# Patient Record
Sex: MALE | Race: WHITE | Employment: OTHER | URBAN - METROPOLITAN AREA
[De-identification: names, ages, dates, MRNs, and addresses within clinical notes are randomized per-mention and may not be internally consistent; named-entity substitution may affect disease eponyms.]

---

## 2017-02-15 ENCOUNTER — HOSPITAL ENCOUNTER (OUTPATIENT)
Dept: CARDIOLOGY | Age: 72
Discharge: HOME | End: 2017-02-15
Payer: MEDICARE

## 2017-02-15 PROCEDURE — 93280 PM DEVICE PROGR EVAL DUAL: CPT

## 2017-08-16 ENCOUNTER — HOSPITAL ENCOUNTER (OUTPATIENT)
Dept: CARDIOLOGY | Age: 72
Discharge: HOME | End: 2017-08-16
Payer: MEDICARE

## 2017-08-16 PROCEDURE — 93280 PM DEVICE PROGR EVAL DUAL: CPT

## 2018-01-24 ENCOUNTER — OFFICE VISIT (OUTPATIENT)
Dept: PULMONOLOGY | Age: 73
End: 2018-01-24
Payer: MEDICARE

## 2018-01-24 VITALS
OXYGEN SATURATION: 94 % | WEIGHT: 219 LBS | HEART RATE: 79 BPM | TEMPERATURE: 98.4 F | BODY MASS INDEX: 31.35 KG/M2 | DIASTOLIC BLOOD PRESSURE: 72 MMHG | SYSTOLIC BLOOD PRESSURE: 132 MMHG | HEIGHT: 70 IN

## 2018-01-24 DIAGNOSIS — R06.02 SOB (SHORTNESS OF BREATH): ICD-10-CM

## 2018-01-24 DIAGNOSIS — R60.9 EDEMA, UNSPECIFIED TYPE: ICD-10-CM

## 2018-01-24 DIAGNOSIS — E66.09 OBESITY DUE TO EXCESS CALORIES, UNSPECIFIED CLASSIFICATION, UNSPECIFIED WHETHER SERIOUS COMORBIDITY PRESENT: ICD-10-CM

## 2018-01-24 DIAGNOSIS — G47.34 NOCTURNAL HYPOXIA: Primary | ICD-10-CM

## 2018-01-24 PROBLEM — C84.90 MATURE NK/T-CELL LYMPHOMA (HCC): Status: ACTIVE | Noted: 2018-01-24

## 2018-01-24 PROBLEM — Z95.0 PACEMAKER: Status: ACTIVE | Noted: 2018-01-24

## 2018-01-24 PROBLEM — Z95.2 S/P MVR (MITRAL VALVE REPLACEMENT): Status: ACTIVE | Noted: 2018-01-24

## 2018-01-24 PROCEDURE — G8417 CALC BMI ABV UP PARAM F/U: HCPCS | Performed by: INTERNAL MEDICINE

## 2018-01-24 PROCEDURE — 3017F COLORECTAL CA SCREEN DOC REV: CPT | Performed by: INTERNAL MEDICINE

## 2018-01-24 PROCEDURE — G8427 DOCREV CUR MEDS BY ELIG CLIN: HCPCS | Performed by: INTERNAL MEDICINE

## 2018-01-24 PROCEDURE — G8484 FLU IMMUNIZE NO ADMIN: HCPCS | Performed by: INTERNAL MEDICINE

## 2018-01-24 PROCEDURE — 1123F ACP DISCUSS/DSCN MKR DOCD: CPT | Performed by: INTERNAL MEDICINE

## 2018-01-24 PROCEDURE — 1036F TOBACCO NON-USER: CPT | Performed by: INTERNAL MEDICINE

## 2018-01-24 PROCEDURE — 99203 OFFICE O/P NEW LOW 30 MIN: CPT | Performed by: INTERNAL MEDICINE

## 2018-01-24 PROCEDURE — 4040F PNEUMOC VAC/ADMIN/RCVD: CPT | Performed by: INTERNAL MEDICINE

## 2018-01-24 RX ORDER — WARFARIN SODIUM 5 MG/1
5 TABLET ORAL
COMMUNITY

## 2018-01-24 RX ORDER — AMLODIPINE, VALSARTAN AND HYDROCHLOROTHIAZIDE 10; 320; 25 MG/1; MG/1; MG/1
TABLET ORAL
Status: ON HOLD | COMMUNITY
End: 2022-01-13

## 2018-01-24 RX ORDER — SULFAMETHOXAZOLE AND TRIMETHOPRIM 80; 16 MG/ML; MG/ML
INJECTION INTRAVENOUS
Status: ON HOLD | COMMUNITY
End: 2018-02-27

## 2018-01-24 RX ORDER — TAMSULOSIN HYDROCHLORIDE 0.4 MG/1
0.4 CAPSULE ORAL DAILY
COMMUNITY

## 2018-01-24 RX ORDER — LANOLIN ALCOHOL/MO/W.PET/CERES
5 CREAM (GRAM) TOPICAL NIGHTLY PRN
COMMUNITY

## 2018-01-24 RX ORDER — CARVEDILOL 25 MG/1
25 TABLET ORAL 2 TIMES DAILY WITH MEALS
COMMUNITY

## 2018-01-24 RX ORDER — FUROSEMIDE 20 MG/1
20 TABLET ORAL DAILY
COMMUNITY
End: 2019-10-10

## 2018-01-24 RX ORDER — ROSUVASTATIN CALCIUM 20 MG/1
20 TABLET, COATED ORAL DAILY
COMMUNITY
End: 2019-10-10

## 2018-01-24 ASSESSMENT — SLEEP AND FATIGUE QUESTIONNAIRES
HOW LIKELY ARE YOU TO NOD OFF OR FALL ASLEEP WHILE SITTING AND TALKING TO SOMEONE: 0
HOW LIKELY ARE YOU TO NOD OFF OR FALL ASLEEP WHILE WATCHING TV: 2
HOW LIKELY ARE YOU TO NOD OFF OR FALL ASLEEP WHEN YOU ARE A PASSENGER IN A CAR FOR AN HOUR WITHOUT A BREAK: 3
HOW LIKELY ARE YOU TO NOD OFF OR FALL ASLEEP WHILE SITTING QUIETLY AFTER LUNCH WITHOUT ALCOHOL: 1
HOW LIKELY ARE YOU TO NOD OFF OR FALL ASLEEP IN A CAR, WHILE STOPPED FOR A FEW MINUTES IN TRAFFIC: 0
ESS TOTAL SCORE: 10
HOW LIKELY ARE YOU TO NOD OFF OR FALL ASLEEP WHILE SITTING INACTIVE IN A PUBLIC PLACE: 0
HOW LIKELY ARE YOU TO NOD OFF OR FALL ASLEEP WHILE LYING DOWN TO REST IN THE AFTERNOON WHEN CIRCUMSTANCES PERMIT: 3
HOW LIKELY ARE YOU TO NOD OFF OR FALL ASLEEP WHILE SITTING AND READING: 1

## 2018-01-24 NOTE — PROGRESS NOTES
facility-administered medications for this visit. Allergies:  Review of patient's allergies indicates no known allergies. No past medical history on file. No past surgical history on file. History reviewed. No pertinent family history. Social History     Social History    Marital status:      Spouse name: N/A    Number of children: N/A    Years of education: N/A     Occupational History    Not on file. Social History Main Topics    Smoking status: Never Smoker    Smokeless tobacco: Never Used    Alcohol use No    Drug use: No    Sexual activity: Not on file     Other Topics Concern    Not on file     Social History Narrative    No narrative on file     FH   Emphysema   No LLOYD       Review of Systems   Psychiatric/Behavioral: Positive for sleep disturbance. ROS: 10 organs review of system is done including general, psychological, ENT, hematological, endocrine, respiratory, cardiovascular, gastrointestinal, musculoskeletal, neurological,  allergy and Immunology is done and is otherwise negative. Objective:     Vitals:    01/24/18 0856   BP: 132/72   Site: Right Arm   Position: Sitting   Cuff Size: Large Adult   Pulse: 79   Temp: 98.4 °F (36.9 °C)   TempSrc: Tympanic   SpO2: 94%   Weight: 219 lb (99.3 kg)   Height: 5' 10\" (1.778 m)         Physical Exam   Constitutional: He is oriented to person, place, and time. He appears well-developed and well-nourished. HENT:   Head: Normocephalic and atraumatic. Eyes: Conjunctivae are normal. Pupils are equal, round, and reactive to light. Left eye exhibits no discharge. Neck: Normal range of motion. Neck supple. No JVD present. Cardiovascular: Normal rate, regular rhythm and normal heart sounds. Exam reveals no gallop and no friction rub. No murmur heard. metallic click    Pulmonary/Chest: Effort normal and breath sounds normal. No respiratory distress. He has no wheezes. He has no rales. He exhibits no tenderness.

## 2018-01-25 ENCOUNTER — HOSPITAL ENCOUNTER (OUTPATIENT)
Dept: SLEEP CENTER | Age: 73
Discharge: HOME | End: 2018-01-25
Payer: MEDICARE

## 2018-01-25 PROCEDURE — 95810 POLYSOM 6/> YRS 4/> PARAM: CPT

## 2018-01-29 ENCOUNTER — HOSPITAL ENCOUNTER (OUTPATIENT)
Dept: SLEEP CENTER | Age: 73
Discharge: HOME | End: 2018-01-29
Payer: MEDICARE

## 2018-01-29 PROCEDURE — 95811 POLYSOM 6/>YRS CPAP 4/> PARM: CPT

## 2018-01-30 ENCOUNTER — TELEPHONE (OUTPATIENT)
Dept: PULMONOLOGY | Age: 73
End: 2018-01-30

## 2018-02-14 ENCOUNTER — HOSPITAL ENCOUNTER (OUTPATIENT)
Dept: CARDIOLOGY | Age: 73
Discharge: HOME | End: 2018-02-14
Payer: MEDICARE

## 2018-02-14 PROCEDURE — 93280 PM DEVICE PROGR EVAL DUAL: CPT

## 2018-02-21 ENCOUNTER — OFFICE VISIT (OUTPATIENT)
Dept: PULMONOLOGY | Age: 73
End: 2018-02-21
Payer: MEDICARE

## 2018-02-21 VITALS
WEIGHT: 222 LBS | TEMPERATURE: 97 F | HEIGHT: 70 IN | BODY MASS INDEX: 31.78 KG/M2 | OXYGEN SATURATION: 96 % | RESPIRATION RATE: 22 BRPM | HEART RATE: 87 BPM | DIASTOLIC BLOOD PRESSURE: 84 MMHG | SYSTOLIC BLOOD PRESSURE: 138 MMHG

## 2018-02-21 DIAGNOSIS — E66.09 OBESITY DUE TO EXCESS CALORIES, UNSPECIFIED CLASSIFICATION, UNSPECIFIED WHETHER SERIOUS COMORBIDITY PRESENT: ICD-10-CM

## 2018-02-21 DIAGNOSIS — G47.33 OSA ON CPAP: Primary | ICD-10-CM

## 2018-02-21 DIAGNOSIS — R06.02 SOB (SHORTNESS OF BREATH): ICD-10-CM

## 2018-02-21 DIAGNOSIS — Z99.89 OSA ON CPAP: Primary | ICD-10-CM

## 2018-02-21 DIAGNOSIS — R60.9 EDEMA, UNSPECIFIED TYPE: ICD-10-CM

## 2018-02-21 DIAGNOSIS — R09.81 NASAL CONGESTION: ICD-10-CM

## 2018-02-21 PROCEDURE — 4040F PNEUMOC VAC/ADMIN/RCVD: CPT | Performed by: INTERNAL MEDICINE

## 2018-02-21 PROCEDURE — 1036F TOBACCO NON-USER: CPT | Performed by: INTERNAL MEDICINE

## 2018-02-21 PROCEDURE — 3017F COLORECTAL CA SCREEN DOC REV: CPT | Performed by: INTERNAL MEDICINE

## 2018-02-21 PROCEDURE — G8427 DOCREV CUR MEDS BY ELIG CLIN: HCPCS | Performed by: INTERNAL MEDICINE

## 2018-02-21 PROCEDURE — 99213 OFFICE O/P EST LOW 20 MIN: CPT | Performed by: INTERNAL MEDICINE

## 2018-02-21 PROCEDURE — G8484 FLU IMMUNIZE NO ADMIN: HCPCS | Performed by: INTERNAL MEDICINE

## 2018-02-21 PROCEDURE — 1123F ACP DISCUSS/DSCN MKR DOCD: CPT | Performed by: INTERNAL MEDICINE

## 2018-02-21 PROCEDURE — G8417 CALC BMI ABV UP PARAM F/U: HCPCS | Performed by: INTERNAL MEDICINE

## 2018-02-21 RX ORDER — FLUTICASONE PROPIONATE 50 MCG
1 SPRAY, SUSPENSION (ML) NASAL DAILY PRN
Qty: 1 BOTTLE | Refills: 1 | Status: SHIPPED | OUTPATIENT
Start: 2018-02-21 | End: 2019-10-10

## 2018-02-21 ASSESSMENT — ENCOUNTER SYMPTOMS
NAUSEA: 0
VOMITING: 0
ABDOMINAL PAIN: 0
SORE THROAT: 0
COUGH: 0
RHINORRHEA: 0
CHEST TIGHTNESS: 0
SHORTNESS OF BREATH: 0
DIARRHEA: 0
SINUS PRESSURE: 0
WHEEZING: 0

## 2018-02-21 NOTE — PROGRESS NOTES
Subjective:     Davon Maharaj is a 67 y.o. male who complains today of:     Chief Complaint   Patient presents with    Shortness of Breath     3 week fu, just been on CPAP 5 days can tell a difference       HPI  Patient presents for SOB/ LLOYD follow up , he started CPAP on Friday , much improved since then sleeping well, mild SOB, energy significantly better , no fever and chills and no cough   Nose is congested , no symptoms of RLS     Allergies:  Review of patient's allergies indicates no known allergies. No past medical history on file. No past surgical history on file. No family history on file. Social History     Social History    Marital status:      Spouse name: N/A    Number of children: N/A    Years of education: N/A     Occupational History    Not on file. Social History Main Topics    Smoking status: Never Smoker    Smokeless tobacco: Never Used    Alcohol use No    Drug use: No    Sexual activity: Not on file     Other Topics Concern    Not on file     Social History Narrative    No narrative on file         Review of Systems   Constitutional: Negative for chills, diaphoresis, fatigue and fever. HENT: Negative for congestion, postnasal drip, rhinorrhea, sinus pressure, sneezing and sore throat. Eyes: Negative for visual disturbance. Respiratory: Negative for cough, chest tightness, shortness of breath and wheezing. Cardiovascular: Positive for palpitations. Negative for chest pain and leg swelling. Gastrointestinal: Negative for abdominal pain, diarrhea, nausea and vomiting. Genitourinary: Negative for difficulty urinating and hematuria. Musculoskeletal: Negative for arthralgias, joint swelling and myalgias. Skin: Negative for rash. Allergic/Immunologic: Negative for environmental allergies. Neurological: Negative for dizziness, tremors, weakness and headaches. Psychiatric/Behavioral: Negative for behavioral problems and sleep disturbance.

## 2018-02-27 ENCOUNTER — HOSPITAL ENCOUNTER (OUTPATIENT)
Dept: CARDIAC CATH/INVASIVE PROCEDURES | Age: 73
Setting detail: OUTPATIENT SURGERY
Discharge: HOME | End: 2018-02-27
Attending: INTERNAL MEDICINE | Admitting: INTERNAL MEDICINE
Payer: MEDICARE

## 2018-02-27 VITALS
OXYGEN SATURATION: 94 % | WEIGHT: 220 LBS | RESPIRATION RATE: 20 BRPM | HEIGHT: 70 IN | SYSTOLIC BLOOD PRESSURE: 136 MMHG | DIASTOLIC BLOOD PRESSURE: 70 MMHG | HEART RATE: 61 BPM | TEMPERATURE: 97.8 F | BODY MASS INDEX: 31.5 KG/M2

## 2018-02-27 PROBLEM — I05.9 MITRAL VALVE DISEASE: Status: ACTIVE | Noted: 2018-02-27

## 2018-02-27 PROBLEM — G47.33 OSA (OBSTRUCTIVE SLEEP APNEA): Status: ACTIVE | Noted: 2018-02-27

## 2018-02-27 PROBLEM — I10 ESSENTIAL HYPERTENSION: Status: ACTIVE | Noted: 2018-02-27

## 2018-02-27 PROBLEM — I48.19 PERSISTENT ATRIAL FIBRILLATION (HCC): Status: ACTIVE | Noted: 2018-02-27

## 2018-02-27 PROBLEM — Z95.2 MITRAL VALVE REPLACED: Status: ACTIVE | Noted: 2018-02-27

## 2018-02-27 LAB
EKG ATRIAL RATE: 375 BPM
EKG ATRIAL RATE: 63 BPM
EKG P AXIS: 18 DEGREES
EKG P-R INTERVAL: 256 MS
EKG Q-T INTERVAL: 494 MS
EKG Q-T INTERVAL: 512 MS
EKG QRS DURATION: 160 MS
EKG QRS DURATION: 170 MS
EKG QTC CALCULATION (BAZETT): 505 MS
EKG QTC CALCULATION (BAZETT): 523 MS
EKG R AXIS: -2 DEGREES
EKG R AXIS: -23 DEGREES
EKG T AXIS: 45 DEGREES
EKG T AXIS: 56 DEGREES
EKG VENTRICULAR RATE: 63 BPM
EKG VENTRICULAR RATE: 63 BPM
INR BLD: 3.6
PROTHROMBIN TIME: 38.1 SEC (ref 8.1–13.7)

## 2018-02-27 PROCEDURE — 2580000003 HC RX 258: Performed by: INTERNAL MEDICINE

## 2018-02-27 PROCEDURE — 93010 ELECTROCARDIOGRAM REPORT: CPT | Performed by: INTERNAL MEDICINE

## 2018-02-27 PROCEDURE — 93280 PM DEVICE PROGR EVAL DUAL: CPT

## 2018-02-27 PROCEDURE — 6360000002 HC RX W HCPCS: Performed by: INTERNAL MEDICINE

## 2018-02-27 PROCEDURE — 92960 CARDIOVERSION ELECTRIC EXT: CPT | Performed by: INTERNAL MEDICINE

## 2018-02-27 PROCEDURE — 85610 PROTHROMBIN TIME: CPT

## 2018-02-27 PROCEDURE — 93005 ELECTROCARDIOGRAM TRACING: CPT

## 2018-02-27 RX ORDER — PROPOFOL 10 MG/ML
INJECTION, EMULSION INTRAVENOUS CONTINUOUS PRN
Status: COMPLETED | OUTPATIENT
Start: 2018-02-27 | End: 2018-02-27

## 2018-02-27 RX ORDER — VALSARTAN 320 MG/1
320 TABLET ORAL DAILY
COMMUNITY
End: 2019-10-10

## 2018-02-27 RX ORDER — PROPOFOL 10 MG/ML
10 INJECTION, EMULSION INTRAVENOUS ONCE
Status: DISCONTINUED | OUTPATIENT
Start: 2018-02-27 | End: 2018-02-27 | Stop reason: HOSPADM

## 2018-02-27 RX ORDER — DEXTROSE AND SODIUM CHLORIDE 5; .45 G/100ML; G/100ML
INJECTION, SOLUTION INTRAVENOUS ONCE
Status: COMPLETED | OUTPATIENT
Start: 2018-02-27 | End: 2018-02-27

## 2018-02-27 RX ADMIN — PROPOFOL 70 MG: 10 INJECTION, EMULSION INTRAVENOUS at 10:00

## 2018-02-27 RX ADMIN — DEXTROSE AND SODIUM CHLORIDE: 5; 450 INJECTION, SOLUTION INTRAVENOUS at 09:12

## 2018-02-27 NOTE — PROGRESS NOTES
Patient is awake and talking. Vitals are stable. Wife and Dr. Chloe Gamble at bedside. Will continue to monitor patient.

## 2018-02-27 NOTE — H&P
Vitals  Vital Signs   Recorded: [id number] 09:43AM   Height: 5 ft 10 in  Weight: 222 lb   BMI Calculated: 31.85  BSA Calculated: 2.18  Blood Pressure: 130 / 80, RUE, Sitting  Heart Rate: 65    PT.'s weight taken without shoes     Results/Data  Prothrombin Time [id number] 02:07PM Harley Simon     Test Name Result Flag Reference   PT 43.5 sec H 9.8-12.7   PLEASE NOTE NEW REFERENCE RANGE EFFECTIVE 02/01/2018   INR 3.80 H 0.90-1.10   PLEASE NOTE NEW REFERENCE RANGE EFFECTIVE 02/01/2018       Surgical History  PT. denies colonoscopy procedure     Procedure    EKG done in office today; :   .     Assessment   1. Never a smoker   2. Sick sinus syndrome (427.81) (I49.5)   3. Hypertension (401.9) (I10)   4. High risk medication use (V58.69) (Z79.899)   5. Anticoagulant long-term use (V58.61) (Z79.01)   6. Hyperlipidemia (272.4) (E78.5)   7. Mitral regurgitation (424.0) (I34.0)   8. Obesity (278.00) (E66.9)   9. Sleep apnea (780.57) (G47.30)   10. Fatigue (780.79) (R53.83)   11. Atrial fibrillation (427.31) (I48.91)   12. Atrial flutter (427.32) (I48.92)    Plan   1. Cardioversion.; Status:Active; Requested for:[id number];    2. Encouraged regular exercise,improved dietary habits.; Status:Complete;   Done:   [id number]   3. Lifestyle education regarding diet; Status:Complete;   Done: 94GBV2396   4. TSH; Status:Active; Requested for:37Zvq0204;    5. Access your medical record, request prescriptions, view laboratory and testing done in   our office, request appointments, view immunization records and message your provider   through our safe and secure patient portal. Ask a staff member how   to register or visit us at [email or web address] to get started today.;   Status:Active; Requested for:19Rzp7152;    6. Avoid foods and beverages that contain caffeine.; Status:Active; Requested   for:57Edn0034;    7. Drink plenty of fluids.; Status:Active; Requested for:18Fez4586;    8.  Please bring all medicines, vitamins, and

## 2018-02-27 NOTE — DISCHARGE SUMMARY
Cardiology Outpatient Discharge Note      Patient:  Simone Alvarado  YOB: 1945  MRN: [id number]   Acct: [de-identified]  Admit Date:  2/27/2018   Discharge Date: 2/27/2018  Primary Cardiologist:Dr. Emerald Dela Cruz      Procedures: [facility] with 200 Joules. Sinus to A/V pacing  Complications: none  Post DCC ECG:  AV pacing    Principal Problem:    Persistent atrial fibrillation (HCC)  Active Problems:    Essential hypertension    LLOYD (obstructive sleep apnea)    Mitral valve disease    Mitral valve replaced      Impression/Plan:  1. Successful conversion of atrial fibrillation to normal sinus rhythm/AV pacing. He had no complications of anesthesia. 2. To be discharged home today with follow-up in 3 weeks. No change of medicines as necessary. He is therapeutically anticoagulated for both atrial fibrillation and his mechanical valve    Chief Complaint/Active Symptoms: No angina/dyspnea/palpitations. Awake and alert after [facility]    Objective:   /82   Pulse 62   Temp 97.8 °F (36.6 °C) (Temporal)   Resp 16   Ht 5' 10\" (1.778 m)   Wt 220 lb (99.8 kg)   SpO2 96%   BMI 31.57 kg/m²    Wt Readings from Last 3 Encounters:   02/27/18 220 lb (99.8 kg)   02/21/18 222 lb (100.7 kg)   01/24/18 219 lb (99.3 kg)       TELEMETRY: paced                            Rhythm Strip: paced a/v  NYHA Class: I    Physical Exam:  General Appearance: alert and oriented to person, place and time, in no acute distress  Cardiovascular: normal rate, regular rhythm, normal S1 and S2, no murmurs, rubs, clicks, or gallops,  no JVD  Pulmonary/Chest: clear to auscultation bilaterally- no wheezes, rales or rhonchi, normal air movement, no respiratory distress  Abdomen: soft, non-tender, non-distended, normal bowel sounds, no masses   Extremities: no cyanosis, clubbing.  Trace edema  Skin: warm and dry, no rash or erythema  Neck: supple and non-tender without mass, no thyromegaly   Neurological: alert, oriented, normal speech, no focal findings or movement disorder noted    Medications:    propofol  10 mg Intravenous Once      propofol         propofol  Continuous PRN       Lab Data:    Cardiac Enzymes:  No results for input(s): CKTOTAL, CKMB, CKMBINDEX, TROPONINI in the last 72 hours.   ProBNP: No results found for: PROBNP    CBC:   Lab Results   Component Value Date    WBC 5.3 02/16/2015    RBC 4.58 02/16/2015    HGB 14.5 02/16/2015    HCT 43.7 02/16/2015     02/16/2015       CMP:  Lab Results   Component Value Date     02/16/2015    K 3.7 02/16/2015     02/16/2015    CO2 28 02/16/2015    BUN 20 02/16/2015    CREATININE 0.93 02/16/2015    GFRAA >60.0 02/16/2015    LABGLOM >60.0 02/16/2015    GLUCOSE 111 02/16/2015    CALCIUM 9.1 02/16/2015       Hepatic Function Panel:  No results found for: ALKPHOS, ALT, AST, PROT, BILITOT, BILIDIR, IBILI, LABALBU    Magnesium:  No results found for: MG    PT/INR:    Lab Results   Component Value Date    PROTIME 38.1 02/27/2018    INR 3.6 02/27/2018                Electronically signed by Parker Villarreal MD on 2/27/2018 at 10:12 AM

## 2018-02-27 NOTE — PROGRESS NOTES
Patient resting in bed. Vitals are stable. Denies any pain or discomfort at this time. Wife at bedside. Will continue to monitor.

## 2018-04-19 ENCOUNTER — OFFICE VISIT (OUTPATIENT)
Dept: PULMONOLOGY | Age: 73
End: 2018-04-19
Payer: MEDICARE

## 2018-04-19 VITALS
HEIGHT: 70 IN | SYSTOLIC BLOOD PRESSURE: 130 MMHG | HEART RATE: 79 BPM | OXYGEN SATURATION: 93 % | DIASTOLIC BLOOD PRESSURE: 70 MMHG | WEIGHT: 224.2 LBS | TEMPERATURE: 96.9 F | BODY MASS INDEX: 32.1 KG/M2

## 2018-04-19 DIAGNOSIS — G47.33 OSA (OBSTRUCTIVE SLEEP APNEA): Primary | ICD-10-CM

## 2018-04-19 DIAGNOSIS — R60.0 LOWER EXTREMITY EDEMA: ICD-10-CM

## 2018-04-19 PROCEDURE — 3017F COLORECTAL CA SCREEN DOC REV: CPT | Performed by: INTERNAL MEDICINE

## 2018-04-19 PROCEDURE — G8417 CALC BMI ABV UP PARAM F/U: HCPCS | Performed by: INTERNAL MEDICINE

## 2018-04-19 PROCEDURE — 99213 OFFICE O/P EST LOW 20 MIN: CPT | Performed by: INTERNAL MEDICINE

## 2018-04-19 PROCEDURE — 1036F TOBACCO NON-USER: CPT | Performed by: INTERNAL MEDICINE

## 2018-04-19 PROCEDURE — G8427 DOCREV CUR MEDS BY ELIG CLIN: HCPCS | Performed by: INTERNAL MEDICINE

## 2018-04-19 PROCEDURE — 1123F ACP DISCUSS/DSCN MKR DOCD: CPT | Performed by: INTERNAL MEDICINE

## 2018-04-19 PROCEDURE — 4040F PNEUMOC VAC/ADMIN/RCVD: CPT | Performed by: INTERNAL MEDICINE

## 2018-05-01 ENCOUNTER — TELEPHONE (OUTPATIENT)
Dept: PULMONOLOGY | Age: 73
End: 2018-05-01

## 2018-06-05 ENCOUNTER — HOSPITAL ENCOUNTER (OUTPATIENT)
Dept: CARDIOLOGY | Age: 73
Discharge: HOME | End: 2018-06-05
Payer: MEDICARE

## 2018-06-05 PROCEDURE — 93280 PM DEVICE PROGR EVAL DUAL: CPT

## 2018-06-11 ENCOUNTER — TELEPHONE (OUTPATIENT)
Dept: PULMONOLOGY | Age: 73
End: 2018-06-11

## 2018-12-20 ENCOUNTER — HOSPITAL ENCOUNTER (OUTPATIENT)
Dept: CARDIOLOGY | Age: 73
Discharge: HOME | End: 2018-12-20
Payer: MEDICARE

## 2018-12-20 PROCEDURE — 93280 PM DEVICE PROGR EVAL DUAL: CPT

## 2019-02-28 NOTE — PROGRESS NOTES
PM analysis post CV  per order Dr. Cherelle Stone. Gd pacer capture, sense, and lead impedances. Normal AAIR-DDDR pacing. No changes made. Battery status OK. Pt is without complaints    labs are within range to proceed   pt has call bell within reach   Clinical trials Nurse Reena Gloria RN did stop and talk with pt chairside at infusion    Baron Gardner RN  Met with patient and discussed  tx

## 2019-04-11 LAB
INR BLD: 2.7 (ref 0.9–1.1)
PROTHROMBIN TIME: 30.7 SEC (ref 9.7–12.7)

## 2019-05-09 LAB
INR BLD: 2.4 (ref 0.9–1.1)
PROTHROMBIN TIME: 27.9 SEC (ref 9.7–12.7)

## 2019-06-06 LAB
CHOLESTEROL/HDL RATIO: 2.8
CHOLESTEROL: 143 MG/DL (ref 0–199)
HDLC SERPL-MCNC: 52 MG/DL
INR BLD: 3.4 (ref 0.9–1.1)
LDL CHOLESTEROL: 78 MG/DL (ref 0–99)
PROTHROMBIN TIME: 38.7 SEC (ref 9.7–12.7)
TRIGL SERPL-MCNC: 63 MG/DL (ref 0–149)
VLDLC SERPL CALC-MCNC: 13 MG/DL (ref 0–40)

## 2019-06-17 ENCOUNTER — HOSPITAL ENCOUNTER (OUTPATIENT)
Dept: PHYSICAL THERAPY | Age: 74
Setting detail: THERAPIES SERIES
Discharge: HOME | End: 2019-06-17
Payer: MEDICARE

## 2019-06-17 ENCOUNTER — HOSPITAL ENCOUNTER (OUTPATIENT)
Dept: ULTRASOUND IMAGING | Age: 74
Discharge: HOME | End: 2019-06-19
Payer: MEDICARE

## 2019-06-17 DIAGNOSIS — I65.23 BILATERAL CAROTID ARTERY STENOSIS: ICD-10-CM

## 2019-06-17 PROCEDURE — 93880 EXTRACRANIAL BILAT STUDY: CPT

## 2019-06-17 PROCEDURE — 97161 PT EVAL LOW COMPLEX 20 MIN: CPT

## 2019-06-17 NOTE — PROGRESS NOTES
[address]     Ph: [phone]  Fax: [phone]    [x] Certification  [] Recertification []  Plan of Care  [] Progress Note [] Discharge      To:  Dr. Riki Grady      From:  Irean Landau, PT  Patient: Nabil Champion     : 1945  Diagnosis: Vertigo     Date: 2019  Treatment Diagnosis: Vertigo    Plan of Care/Certification Expiration Date: 19  Progress Report Period from:  2019  to 2019    Total # of Visits to Date: 1   No Show: 0    Canceled Appointment: 0     OBJECTIVE:   Strength in natasha UEs and LEs WFL    Smooth pursuits, saccade and VOR WFL    Spine  Cervical: Flex 37deg, Ext 6deg, SB: Rt 11deg, Lt 8deg, rot: Rt 28deg, Lt 23deg    Natasha Hallpike (-) - limited by cervical extension     Body structures, Functions, Activity limitations: Vestibular Impairment  Assessment: Pt presents with occasional bouts of spinning dizziness for the last several months. Pt with no significant LOB reported and demonstrates strength WFL. Pt with significant limited cervical ROM possibly contributing to dizziness symptoms. Pt's smooth pursuits, saccades, and VOR WFL and demos (-) natasha Head Thrust test.  Attempted natasha Hallpike with no increase in symptoms or nystagmus noted but limited by cervical ROM. Cervical AROM and chin tucks given for pt to improve ROM while awaiting to see specialist.  Discussed pt to f/u in the next 30 days if dizziness reoccurs - if no f/u in that time will D/C chart. Prognosis: Good    PLAN: [] Evaluate and Treat  Frequency/Duration:  Plan  Plan Comment: F/u in the next 30 days if needed     Precautions:                  Patient Status:[x] Hold x30 days    [] Discharge PT. Recommend pt continue with HEP.      [] Additional visits requested, Please re-certify for additional visits:          Signature: Electronically signed by Irean Landau, PT on 19 at 1:23 PM      If you have any questions or concerns, please don't hesitate to call. Thank you for your referral.    I have reviewed this plan of care and certify a need for medically necessary rehabilitation services.     Physician Signature:__________________________________________________________  Date:  Please sign and return

## 2019-06-17 NOTE — PROGRESS NOTES
28deg, Lt 23deg  Strength RLE  Strength RLE: WFL  Strength LLE  Strength LLE: WFL  Strength RUE  Strength RUE: WFL  Strength LUE  Strength LUE: WFL    Vestibular Assessment:  Patient experiences spells of vertigo?: Yes  Describe Vertigo: Room Spinning  How long do these spells last?: Minutes  Vertigo Is: Spontaneous     Patient experiences disequilibrium?: No     Associated hearing/ear symptoms: No     Any recent Illness or Infections?: No     Any recent accidents or head trauma?: No     Any history of migraines or headaches?: No     Oculomotor Examination  Oculomotor Examination: Yes  Spontaneous Nystagmus: No  Gaze Holding Nystagmus: No  Eye Movement Range: Conjugate  Smooth Pursuits: WNL  Saccades: WNL (2 or less)  VOR (slow): WNL  Head Impulse Test/ Head Thrust Test: Negative       Positional Vertigo:   Intensity (0-5) Nystagmus Duration   Sit -> Rt Sidelying 0     Rt Sidelying -> Sit 0     Sit -> Lt Sidelying 0     Lt Sidelying -> Sit 0       Positional Testing  Right [place] Hallpike: Negative(Limited cervical ext ROM)  Left [place] Hallpike: Negative(Limited cervical ext ROM)    Exercises:   Exercises  Exercise 20: HEP: cervical AROM and chin tucks      POST-PAIN    Pain Rating (0-10 pain scale): 0/10  Location and Pain Description same as pre-pain unless otherwise indicated. Action: [x] NA  [] Call Physician  [] Perform HEP  [] Meds as prescribed     Assessment  Conditions Requiring Skilled Therapeutic Intervention  Body structures, Functions, Activity limitations: Vestibular Impairment  Assessment: Pt presents with occasional bouts of spinning dizziness for the last several months. Pt with no significant LOB reported and demonstrates strength WFL. Pt with significant limited cervical ROM possibly contributing to dizziness symptoms. Pt's smooth pursuits, saccades, and VOR WFL and demos (-) natasha Head Thrust test.  Attempted natasha Hallpike with no increase in symptoms or nystagmus noted but limited by cervical ROM.

## 2019-06-20 ENCOUNTER — HOSPITAL ENCOUNTER (OUTPATIENT)
Dept: CARDIOLOGY | Age: 74
Discharge: HOME | End: 2019-06-20
Payer: MEDICARE

## 2019-06-20 PROCEDURE — 93280 PM DEVICE PROGR EVAL DUAL: CPT

## 2019-07-02 LAB
ALBUMIN: 4.2 G/DL (ref 3.4–5)
ALP BLD-CCNC: 57 U/L (ref 33–136)
ALT SERPL-CCNC: 23 U/L (ref 10–52)
ANION GAP SERPL CALCULATED.3IONS-SCNC: 11 MMOL/L (ref 10–20)
AST SERPL-CCNC: 21 U/L (ref 9–39)
BICARBONATE: 31 MMOL/L (ref 21–32)
BILIRUB SERPL-MCNC: 0.9 MG/DL (ref 0–1.2)
CALCIUM SERPL-MCNC: 9.3 MG/DL (ref 8.6–10.3)
CHLORIDE BLD-SCNC: 100 MMOL/L (ref 98–107)
CREAT SERPL-MCNC: 1 MG/DL (ref 0.5–1.3)
ESTIMATED AVERAGE GLUCOSE: 140 MG/DL
GFR AFRICAN AMERICAN: >60 ML/MIN/1.73M2
GFR NON-AFRICAN AMERICAN: >60 ML/MIN/1.73M2
GLUCOSE: 137 MG/DL (ref 74–99)
HBA1C MFR BLD: 6.5 %
INR BLD: 3 (ref 0.9–1.1)
POTASSIUM SERPL-SCNC: 3.6 MMOL/L (ref 3.5–5.3)
PROTHROMBIN TIME: 34.1 SEC (ref 9.7–12.7)
SODIUM BLD-SCNC: 138 MMOL/L (ref 136–145)
TOTAL PROTEIN: 7.2 G/DL (ref 6.4–8.2)
UREA NITROGEN: 16 MG/DL (ref 6–23)

## 2019-07-15 NOTE — PROGRESS NOTES
[address] [facility] [address]      [address]     [address] [facility], [facility] [address]  Ph: [phone]     Ph: [phone]  Fax: [phone]     Fax: [phone]      Date: 7/15/2019  Patient Name: Angie Jung  : 1945  MRN: [id number]    To:  Dr. Terrell Palmer  From: Cl Barth, DANA       [x]   Patient was previously on hold since 19 and is now appropriate for discharge. Please see last POC/ Progress Report for last measured functional status. Thank you for your referral and the opportunity to treat this patient. Please contact us with any questions or concerns.         Electronically signed by Yanely De La Fuente PTA on 7/15/2019 at 12:08 PM  Electronically signed by Cl Barth PT on 7/15/2019 at 1:36 PM

## 2019-07-15 NOTE — PROGRESS NOTES
[facility]    [x]  [address]  []  [address]      [address]     [address]  Ph: [phone]     Ph: [phone]  Fax: [phone]     Fax: [phone]      Date: 7/15/2019  Patient Name: Mehrdad France  : 1945  MRN: [id number]    Pt called back to state he is doing well, requests to be discharged at this time.      Electronically signed by Carlos Manuel Cortés PTA on 7/15/2019 at 12:07 PM

## 2019-09-23 ENCOUNTER — TELEPHONE (OUTPATIENT)
Dept: PULMONOLOGY | Age: 74
End: 2019-09-23

## 2019-09-24 LAB
INR BLD: 4.8 (ref 0.9–1.1)
PROTHROMBIN TIME: 54.7 SEC (ref 9.7–12.7)

## 2019-10-01 LAB
INR BLD: 4.2 (ref 0.9–1.1)
PROTHROMBIN TIME: 49 SEC (ref 9.7–12.7)

## 2019-10-10 ENCOUNTER — OFFICE VISIT (OUTPATIENT)
Dept: PULMONOLOGY | Age: 74
End: 2019-10-10
Payer: MEDICARE

## 2019-10-10 VITALS
TEMPERATURE: 97.3 F | WEIGHT: 216.8 LBS | RESPIRATION RATE: 15 BRPM | DIASTOLIC BLOOD PRESSURE: 74 MMHG | BODY MASS INDEX: 31.04 KG/M2 | SYSTOLIC BLOOD PRESSURE: 136 MMHG | HEIGHT: 70 IN | HEART RATE: 73 BPM | OXYGEN SATURATION: 98 %

## 2019-10-10 DIAGNOSIS — G47.33 OSA (OBSTRUCTIVE SLEEP APNEA): Primary | ICD-10-CM

## 2019-10-10 DIAGNOSIS — I48.19 PERSISTENT ATRIAL FIBRILLATION (HCC): ICD-10-CM

## 2019-10-10 DIAGNOSIS — E66.09 CLASS 1 OBESITY DUE TO EXCESS CALORIES WITHOUT SERIOUS COMORBIDITY WITH BODY MASS INDEX (BMI) OF 31.0 TO 31.9 IN ADULT: ICD-10-CM

## 2019-10-10 PROCEDURE — G8598 ASA/ANTIPLAT THER USED: HCPCS | Performed by: INTERNAL MEDICINE

## 2019-10-10 PROCEDURE — 1123F ACP DISCUSS/DSCN MKR DOCD: CPT | Performed by: INTERNAL MEDICINE

## 2019-10-10 PROCEDURE — 3017F COLORECTAL CA SCREEN DOC REV: CPT | Performed by: INTERNAL MEDICINE

## 2019-10-10 PROCEDURE — G8417 CALC BMI ABV UP PARAM F/U: HCPCS | Performed by: INTERNAL MEDICINE

## 2019-10-10 PROCEDURE — G8427 DOCREV CUR MEDS BY ELIG CLIN: HCPCS | Performed by: INTERNAL MEDICINE

## 2019-10-10 PROCEDURE — 99213 OFFICE O/P EST LOW 20 MIN: CPT | Performed by: INTERNAL MEDICINE

## 2019-10-10 PROCEDURE — 1036F TOBACCO NON-USER: CPT | Performed by: INTERNAL MEDICINE

## 2019-10-10 PROCEDURE — 4040F PNEUMOC VAC/ADMIN/RCVD: CPT | Performed by: INTERNAL MEDICINE

## 2019-10-10 PROCEDURE — G8484 FLU IMMUNIZE NO ADMIN: HCPCS | Performed by: INTERNAL MEDICINE

## 2019-10-10 RX ORDER — EZETIMIBE 10 MG/1
TABLET ORAL
Refills: 3 | COMMUNITY
Start: 2019-07-03

## 2019-10-10 RX ORDER — PRAVASTATIN SODIUM 20 MG
TABLET ORAL
Refills: 3 | COMMUNITY
Start: 2019-07-10

## 2019-10-21 DIAGNOSIS — G47.33 OSA (OBSTRUCTIVE SLEEP APNEA): Primary | ICD-10-CM

## 2019-10-22 LAB
INR BLD: 3.7 (ref 0.9–1.1)
PROTHROMBIN TIME: 42.2 SEC (ref 9.7–12.7)

## 2019-11-20 LAB
INR BLD: 5.4 (ref 0.9–1.1)
PROTHROMBIN TIME: 62.1 SEC (ref 9.7–12.7)

## 2019-12-04 LAB
INR BLD: 3.6 (ref 0.9–1.1)
PROTHROMBIN TIME: 41.8 SEC (ref 9.7–12.7)

## 2019-12-18 LAB
INR BLD: 3 (ref 0.9–1.1)
PROTHROMBIN TIME: 33.8 SEC (ref 9.7–12.7)

## 2019-12-19 ENCOUNTER — HOSPITAL ENCOUNTER (OUTPATIENT)
Dept: CARDIOLOGY | Age: 74
Discharge: HOME | End: 2019-12-19
Payer: MEDICARE

## 2019-12-19 PROCEDURE — 93280 PM DEVICE PROGR EVAL DUAL: CPT

## 2020-01-06 LAB
INR BLD: 2.6 (ref 0.9–1.1)
PROTHROMBIN TIME: 29 SEC (ref 9.7–12.7)

## 2020-03-17 PROBLEM — M48.062 LUMBAR STENOSIS WITH NEUROGENIC CLAUDICATION: Status: ACTIVE | Noted: 2020-03-17

## 2020-03-18 LAB
INR BLD: 2.1
PROTHROMBIN TIME: 24.8 SEC (ref 12.3–14.9)

## 2020-08-05 ENCOUNTER — HOSPITAL ENCOUNTER (OUTPATIENT)
Dept: CARDIOLOGY | Age: 75
Discharge: HOME | End: 2020-08-05
Payer: MEDICARE

## 2020-08-05 PROCEDURE — 93296 REM INTERROG EVL PM/IDS: CPT

## 2020-10-19 ENCOUNTER — OFFICE VISIT (OUTPATIENT)
Dept: PULMONOLOGY | Age: 75
End: 2020-10-19
Payer: MEDICARE

## 2020-10-19 VITALS
SYSTOLIC BLOOD PRESSURE: 128 MMHG | RESPIRATION RATE: 16 BRPM | HEART RATE: 73 BPM | HEIGHT: 70 IN | TEMPERATURE: 97.6 F | BODY MASS INDEX: 31.75 KG/M2 | DIASTOLIC BLOOD PRESSURE: 60 MMHG | WEIGHT: 221.8 LBS | OXYGEN SATURATION: 98 %

## 2020-10-19 PROCEDURE — G8417 CALC BMI ABV UP PARAM F/U: HCPCS | Performed by: INTERNAL MEDICINE

## 2020-10-19 PROCEDURE — 3017F COLORECTAL CA SCREEN DOC REV: CPT | Performed by: INTERNAL MEDICINE

## 2020-10-19 PROCEDURE — 1123F ACP DISCUSS/DSCN MKR DOCD: CPT | Performed by: INTERNAL MEDICINE

## 2020-10-19 PROCEDURE — G8427 DOCREV CUR MEDS BY ELIG CLIN: HCPCS | Performed by: INTERNAL MEDICINE

## 2020-10-19 PROCEDURE — 4040F PNEUMOC VAC/ADMIN/RCVD: CPT | Performed by: INTERNAL MEDICINE

## 2020-10-19 PROCEDURE — G8484 FLU IMMUNIZE NO ADMIN: HCPCS | Performed by: INTERNAL MEDICINE

## 2020-10-19 PROCEDURE — 1036F TOBACCO NON-USER: CPT | Performed by: INTERNAL MEDICINE

## 2020-10-19 PROCEDURE — 99214 OFFICE O/P EST MOD 30 MIN: CPT | Performed by: INTERNAL MEDICINE

## 2020-10-19 RX ORDER — POTASSIUM CHLORIDE 20 MEQ/1
20 TABLET, EXTENDED RELEASE ORAL DAILY
Qty: 3 TABLET | Refills: 0 | Status: SHIPPED | OUTPATIENT
Start: 2020-10-19 | End: 2021-11-02

## 2020-10-19 RX ORDER — FUROSEMIDE 20 MG/1
20 TABLET ORAL DAILY
Qty: 3 TABLET | Refills: 3 | Status: SHIPPED | OUTPATIENT
Start: 2020-10-19 | End: 2020-10-19

## 2020-10-19 RX ORDER — FUROSEMIDE 20 MG/1
20 TABLET ORAL DAILY
Qty: 3 TABLET | Refills: 0 | Status: SHIPPED | OUTPATIENT
Start: 2020-10-19 | End: 2021-11-02

## 2020-10-19 RX ORDER — TRIAMCINOLONE ACETONIDE 1 MG/G
CREAM TOPICAL
Status: ON HOLD | COMMUNITY
Start: 2020-10-13 | End: 2022-01-13

## 2020-10-19 RX ORDER — KETOCONAZOLE 20 MG/G
CREAM TOPICAL
Status: ON HOLD | COMMUNITY
Start: 2020-10-13 | End: 2022-01-13

## 2020-11-04 ENCOUNTER — HOSPITAL ENCOUNTER (OUTPATIENT)
Dept: CARDIOLOGY | Age: 75
Discharge: HOME | End: 2020-11-04
Payer: MEDICARE

## 2020-11-04 PROCEDURE — 93280 PM DEVICE PROGR EVAL DUAL: CPT

## 2021-02-03 ENCOUNTER — HOSPITAL ENCOUNTER (OUTPATIENT)
Dept: CARDIOLOGY | Age: 76
Discharge: HOME | End: 2021-02-03
Payer: MEDICARE

## 2021-02-03 PROCEDURE — 93296 REM INTERROG EVL PM/IDS: CPT

## 2021-05-05 ENCOUNTER — HOSPITAL ENCOUNTER (OUTPATIENT)
Dept: CARDIOLOGY | Age: 76
Discharge: HOME | End: 2021-05-05
Payer: MEDICARE

## 2021-05-05 PROCEDURE — 93296 REM INTERROG EVL PM/IDS: CPT

## 2021-11-02 ENCOUNTER — OFFICE VISIT (OUTPATIENT)
Dept: PULMONOLOGY | Age: 76
End: 2021-11-02
Payer: MEDICARE

## 2021-11-02 VITALS
HEIGHT: 70 IN | HEART RATE: 79 BPM | SYSTOLIC BLOOD PRESSURE: 150 MMHG | BODY MASS INDEX: 31.15 KG/M2 | DIASTOLIC BLOOD PRESSURE: 80 MMHG | RESPIRATION RATE: 16 BRPM | OXYGEN SATURATION: 96 % | TEMPERATURE: 97.1 F | WEIGHT: 217.6 LBS

## 2021-11-02 DIAGNOSIS — R09.81 NASAL CONGESTION: ICD-10-CM

## 2021-11-02 DIAGNOSIS — R06.02 SOB (SHORTNESS OF BREATH): ICD-10-CM

## 2021-11-02 DIAGNOSIS — G47.33 OSA (OBSTRUCTIVE SLEEP APNEA): Primary | ICD-10-CM

## 2021-11-02 PROCEDURE — G8427 DOCREV CUR MEDS BY ELIG CLIN: HCPCS | Performed by: INTERNAL MEDICINE

## 2021-11-02 PROCEDURE — 4040F PNEUMOC VAC/ADMIN/RCVD: CPT | Performed by: INTERNAL MEDICINE

## 2021-11-02 PROCEDURE — 1123F ACP DISCUSS/DSCN MKR DOCD: CPT | Performed by: INTERNAL MEDICINE

## 2021-11-02 PROCEDURE — 1036F TOBACCO NON-USER: CPT | Performed by: INTERNAL MEDICINE

## 2021-11-02 PROCEDURE — G8484 FLU IMMUNIZE NO ADMIN: HCPCS | Performed by: INTERNAL MEDICINE

## 2021-11-02 PROCEDURE — G8417 CALC BMI ABV UP PARAM F/U: HCPCS | Performed by: INTERNAL MEDICINE

## 2021-11-02 PROCEDURE — 99213 OFFICE O/P EST LOW 20 MIN: CPT | Performed by: INTERNAL MEDICINE

## 2021-11-02 RX ORDER — FLUTICASONE PROPIONATE 50 MCG
1 SPRAY, SUSPENSION (ML) NASAL DAILY
Qty: 16 G | Refills: 3 | Status: SHIPPED | OUTPATIENT
Start: 2021-11-02

## 2021-11-02 NOTE — PROGRESS NOTES
Subjective:     Mindy Agustin is a 68 y.o. male who complains today of:     Chief Complaint   Patient presents with    Follow-up     1 YR F/U for LLOYD on CPAP       HPI  Patient presents for LLOYD and shortness of breath      Patient has CPAP at home, he is compliant with treatment, he is not comfortable with the current headgear it causes discomfort at the back of his head and a different headgear he has caused discomfort at the top of his head he found a new headgear that holds at the middle posterior chest in the occipital area which he feels more comfortable using. Otherwise he is compliant with no issues. He has nasal congestion, no postnasal drip or runny nose  No heartburn, no chest pain, no lower extremity edema, no fever no chills. Weight is stable. He report dyspnea on exertion with mild to moderate activity. Allergies:  Crestor [rosuvastatin], Statins, and Procainamide  Past Medical History:   Diagnosis Date    Cancer ([address]     Depression     Osteoarthritis     Sleep apnea      History reviewed. No pertinent surgical history. History reviewed. No pertinent family history.   Social History     Socioeconomic History    Marital status:      Spouse name: Not on file    Number of children: Not on file    Years of education: Not on file    Highest education level: Not on file   Occupational History    Not on file   Tobacco Use    Smoking status: Never Smoker    Smokeless tobacco: Never Used   Vaping Use    Vaping Use: Never used   Substance and Sexual Activity    Alcohol use: No    Drug use: No    Sexual activity: Not on file   Other Topics Concern    Not on file   Social History Narrative    Not on file     Social Determinants of Health     Financial Resource Strain:     Difficulty of Paying Living Expenses:    Food Insecurity:     Worried About Running Out of Food in the Last Year:     [address] in the Last Year:    Transportation Needs:     Lack of Transportation (Medical):  Lack of Transportation (Non-Medical):    Physical Activity:     Days of Exercise per Week:     Minutes of Exercise per Session:    Stress:     Feeling of Stress :    Social Connections:     Frequency of Communication with Friends and Family:     Frequency of Social Gatherings with Friends and Family:     Attends Religion Services:     Active Member of Clubs or Organizations:     Attends Club or Organization Meetings:     Marital Status:    Intimate Partner Violence:     Fear of Current or Ex-Partner:     Emotionally Abused:     Physically Abused:     Sexually Abused:          Review of Systems      ROS: 10 organs review of system is done including general, psychological, ENT, hematological, endocrine, respiratory, cardiovascular, gastrointestinal,musculoskeletal, neurological,  allergy and Immunology is done and is otherwise negative.     Current Outpatient Medications   Medication Sig Dispense Refill    fluticasone (FLONASE) 50 MCG/ACT nasal spray 1 spray by Each Nostril route daily 16 g 3    hydrocortisone 2.5 % cream apply to armpits on the weekends      ketoconazole (NIZORAL) 2 % cream apply 1 Application as directed topically Monday through Friday, once per day      triamcinolone (KENALOG) 0.1 % cream apply 1 (ONE) application to affected area for two weeks then stop for at least one week before reapplying      Respiratory Therapy Supplies SHANIA Cpap Supplies 1 Device 0    ezetimibe (ZETIA) 10 MG tablet take 1 tablet by mouth at bedtime  3    pravastatin (PRAVACHOL) 20 MG tablet TAKE 1 TABLET BY MOUTH at bedtime  3    CPAP Machine MISC by Does not apply route Dispense new CPAP supplies 1 each 0    melatonin 3 MG TABS tablet Take 5 mg by mouth nightly as needed       warfarin (COUMADIN) 5 MG tablet Take 5 mg by mouth 5mg daily except Thursday and Sunday 7.5mg      tamsulosin (FLOMAX) 0.4 MG capsule Take 0.4 mg by mouth daily      carvedilol (COREG) 25 MG tablet Take 25 mg by mouth 2 times daily (with meals)      sertraline (ZOLOFT) 50 MG tablet Take 50 mg by mouth daily Takes 1.5 tablets a day      amLODIPine-valsartan-HCTZ -25 MG TABS Take by mouth       No current facility-administered medications for this visit. Objective:     Vitals:    11/02/21 1556 11/02/21 1604   BP: (!) 154/84 (!) 150/80   Site: Right Upper Arm Left Upper Arm   Position: Sitting Sitting   Cuff Size: Large Adult Large Adult   Pulse: 79    Resp: 16    Temp: 97.1 °F (36.2 °C)    TempSrc: Tympanic    SpO2: 96%    Weight: 217 lb 9.6 oz (98.7 kg)    Height: 5' 10\" (1.778 m)          Physical Exam  Constitutional:       Appearance: He is well-developed. HENT:      Head: Normocephalic and atraumatic. Eyes:      General:         Left eye: No discharge. Conjunctiva/sclera: Conjunctivae normal.      Pupils: Pupils are equal, round, and reactive to light. Neck:      Vascular: No JVD. Cardiovascular:      Rate and Rhythm: Normal rate and regular rhythm. Heart sounds: Normal heart sounds. No murmur heard. No friction rub. No gallop. Pulmonary:      Effort: Pulmonary effort is normal. No respiratory distress. Breath sounds: Normal breath sounds. No wheezing or rales. Chest:      Chest wall: No tenderness. Abdominal:      General: Bowel sounds are normal.      Palpations: Abdomen is soft. Musculoskeletal:         General: No tenderness or deformity. Cervical back: Normal range of motion and neck supple. Right lower leg: No edema. Left lower leg: No edema. Skin:     General: Skin is warm and dry. Neurological:      Mental Status: He is alert and oriented to person, place, and time. Psychiatric:         Judgment: Judgment normal.         Imaging studies reviewed by me none  Lab results reviewed in chart       Assessment and Plan       Diagnosis Orders   1. LLOYD (obstructive sleep apnea)     2. Nasal congestion  fluticasone (FLONASE) 50 MCG/ACT nasal spray   3. SOB (shortness of breath)  Full PFT Study With Bronchodilator     · LLOYD, patient compliant with treatment, continue CPAP while asleep. He will get a new mask and new headgear that he found online, will reevaluate on follow-up  · Nasal congestion, start Flonase  · Dyspnea exertion, will obtain PFT and evaluate on follow-up. Orders Placed This Encounter   Procedures    Full PFT Study With Bronchodilator     Standing Status:   Future     Standing Expiration Date:   2023     Orders Placed This Encounter   Medications    fluticasone (FLONASE) 50 MCG/ACT nasal spray     Si spray by Each Nostril route daily     Dispense:  16 g     Refill:  3            Discussed with patient the importance of exercise and weight control and  overall health and well-being. Reviewed with the patient: current clinical status, medications, activities and diet. Side effects, adverse effects of the medication prescribed today, as well as treatment plan and result expectations have been discussed with the patient who expresses understanding and desires to proceed. Return in about 6 weeks (around 2021).       Sundeep Friedman MD

## 2021-11-09 ENCOUNTER — HOSPITAL ENCOUNTER (OUTPATIENT)
Dept: CARDIOLOGY | Age: 76
Discharge: HOME | End: 2021-11-09
Payer: MEDICARE

## 2021-11-09 PROCEDURE — 93280 PM DEVICE PROGR EVAL DUAL: CPT

## 2021-11-22 ENCOUNTER — HOSPITAL ENCOUNTER (OUTPATIENT)
Dept: PULMONOLOGY | Age: 76
Discharge: HOME | End: 2021-11-22
Payer: MEDICARE

## 2021-11-22 DIAGNOSIS — R06.02 SOB (SHORTNESS OF BREATH): ICD-10-CM

## 2021-11-22 PROCEDURE — 94060 EVALUATION OF WHEEZING: CPT

## 2021-11-22 PROCEDURE — 94729 DIFFUSING CAPACITY: CPT

## 2021-11-22 PROCEDURE — 94726 PLETHYSMOGRAPHY LUNG VOLUMES: CPT

## 2021-11-22 PROCEDURE — 6360000002 HC RX W HCPCS: Performed by: INTERNAL MEDICINE

## 2021-11-22 RX ORDER — ALBUTEROL SULFATE 2.5 MG/3ML
2.5 SOLUTION RESPIRATORY (INHALATION) ONCE
Status: COMPLETED | OUTPATIENT
Start: 2021-11-22 | End: 2021-11-22

## 2021-11-22 RX ADMIN — ALBUTEROL SULFATE 2.5 MG: 2.5 SOLUTION RESPIRATORY (INHALATION) at 13:17

## 2021-11-23 PROCEDURE — 94726 PLETHYSMOGRAPHY LUNG VOLUMES: CPT | Performed by: INTERNAL MEDICINE

## 2021-11-23 PROCEDURE — 94729 DIFFUSING CAPACITY: CPT | Performed by: INTERNAL MEDICINE

## 2021-11-23 PROCEDURE — 94060 EVALUATION OF WHEEZING: CPT | Performed by: INTERNAL MEDICINE

## 2021-11-23 NOTE — PROCEDURES
[address]                      [facility], [address] [facility]                    PULMONARY FUNCTION  Rodriguezn Adriane   68 y.o.   male  Height 70 in  Weight 217 lb      Referring provider   Francie Skaggs MD    Reading provider   Radha Choi MD    Test meets ATS criteria for acceptability and reproducibility Yes    Diagnosis: JOHNSON: Yes  Cough   No, wheezing No  Smoking   no    Spirometry   FVC            2.24 L   53%  Post bronchodilator 2.18 L  52% Change -2 %  FEV1          1.79 L  59%   Post bronchodilator  1.85 L  61%   Change 3%  FEV1/FVC  80  %             Post bronchodilator  84 %  [id number]% 1.68 L  77%  Post bronchodilator  2.13 L  98%   Change 26%    Lung volume   SVC           2.10 L  50%   RV              2.59 L 100%   TLC            5.69  L 66%   RV/TLC      55 %    DLCO           81 %     Test interpretation     Spirometry suggest restriction, with no significant response to bronchodilator  Lung volumes confirms moderately severe restrictive lung disease  Patient also has increased RV to TLC ratio indicating air trapping and 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 Diffusion capacity is normal indicating restriction is due to extra pulmonary etiology.        Clinical correlation is recommended     Radha Choi MD [facility], 11/23/2021 5:49 PM

## 2021-12-20 ENCOUNTER — OFFICE VISIT (OUTPATIENT)
Dept: PULMONOLOGY | Age: 76
End: 2021-12-20
Payer: MEDICARE

## 2021-12-20 VITALS
SYSTOLIC BLOOD PRESSURE: 142 MMHG | WEIGHT: 221 LBS | BODY MASS INDEX: 31.64 KG/M2 | TEMPERATURE: 97.2 F | HEIGHT: 70 IN | DIASTOLIC BLOOD PRESSURE: 72 MMHG | RESPIRATION RATE: 16 BRPM | HEART RATE: 89 BPM | OXYGEN SATURATION: 95 %

## 2021-12-20 DIAGNOSIS — J98.4 RESTRICTIVE LUNG DISEASE: ICD-10-CM

## 2021-12-20 DIAGNOSIS — E66.09 CLASS 1 OBESITY DUE TO EXCESS CALORIES WITHOUT SERIOUS COMORBIDITY WITH BODY MASS INDEX (BMI) OF 31.0 TO 31.9 IN ADULT: ICD-10-CM

## 2021-12-20 DIAGNOSIS — G47.33 OSA (OBSTRUCTIVE SLEEP APNEA): ICD-10-CM

## 2021-12-20 DIAGNOSIS — J45.40 MODERATE PERSISTENT ASTHMA WITHOUT COMPLICATION: ICD-10-CM

## 2021-12-20 DIAGNOSIS — R06.02 SOB (SHORTNESS OF BREATH): Primary | ICD-10-CM

## 2021-12-20 LAB
BASOPHILS ABSOLUTE: 0.1 K/UL (ref 0–0.2)
BASOPHILS RELATIVE PERCENT: 1.1 %
EOSINOPHILS ABSOLUTE: 0.3 K/UL (ref 0–0.7)
EOSINOPHILS RELATIVE PERCENT: 5.1 %
HCT VFR BLD CALC: 40.9 % (ref 42–52)
HEMOGLOBIN: 13.7 G/DL (ref 14–18)
LYMPHOCYTES ABSOLUTE: 0.7 K/UL (ref 1–4.8)
LYMPHOCYTES RELATIVE PERCENT: 14.3 %
MCH RBC QN AUTO: 31.8 PG (ref 27–31.3)
MCHC RBC AUTO-ENTMCNC: 33.5 % (ref 33–37)
MCV RBC AUTO: 95 FL (ref 80–100)
MONOCYTES ABSOLUTE: 0.5 K/UL (ref 0.2–0.8)
MONOCYTES RELATIVE PERCENT: 10.6 %
NEUTROPHILS ABSOLUTE: 3.4 K/UL (ref 1.4–6.5)
NEUTROPHILS RELATIVE PERCENT: 68.9 %
PDW BLD-RTO: 14.8 % (ref 11.5–14.5)
PLATELET # BLD: 145 K/UL (ref 130–400)
RBC # BLD: 4.31 M/UL (ref 4.7–6.1)
WBC # BLD: 4.9 K/UL (ref 4.8–10.8)

## 2021-12-20 PROCEDURE — 99214 OFFICE O/P EST MOD 30 MIN: CPT | Performed by: INTERNAL MEDICINE

## 2021-12-20 PROCEDURE — G8484 FLU IMMUNIZE NO ADMIN: HCPCS | Performed by: INTERNAL MEDICINE

## 2021-12-20 PROCEDURE — 1036F TOBACCO NON-USER: CPT | Performed by: INTERNAL MEDICINE

## 2021-12-20 PROCEDURE — G8417 CALC BMI ABV UP PARAM F/U: HCPCS | Performed by: INTERNAL MEDICINE

## 2021-12-20 PROCEDURE — 1123F ACP DISCUSS/DSCN MKR DOCD: CPT | Performed by: INTERNAL MEDICINE

## 2021-12-20 PROCEDURE — G8427 DOCREV CUR MEDS BY ELIG CLIN: HCPCS | Performed by: INTERNAL MEDICINE

## 2021-12-20 PROCEDURE — 4040F PNEUMOC VAC/ADMIN/RCVD: CPT | Performed by: INTERNAL MEDICINE

## 2021-12-20 RX ORDER — FLUTICASONE FUROATE AND VILANTEROL TRIFENATATE 100; 25 UG/1; UG/1
1 POWDER RESPIRATORY (INHALATION) DAILY
Qty: 1 EACH | Refills: 2 | Status: ON HOLD | OUTPATIENT
Start: 2021-12-20 | End: 2022-01-13

## 2021-12-20 NOTE — PROGRESS NOTES
Subjective:     Francesco Singh is a 68 y.o. male who complains today of:     Chief Complaint   Patient presents with    Follow-up     6 Week F/U for LLOYD on CPAP and Shortness of Breath    Results     PFT       HPI  Patient presents for shortness of breath    Patient continues to have dyspnea exertion, mainly walking 100 yards or more, he gets short of breath, sometimes while singing he would get short of breath. Denies chest pain, no coughing, he is on Flonase, uses as needed for nasal congestion, no postnasal drip., he does have mild lower extremity edema and at baseline, no fever no chills, he is compliant with CPAP and uses every day, weight is stable, no skin rash, no joint swelling or stiffness or pain. Allergies:  Crestor [rosuvastatin], Statins, and Procainamide  Past Medical History:   Diagnosis Date    Cancer ([facility] [address]     Depression     Osteoarthritis     Sleep apnea      History reviewed. No pertinent surgical history. History reviewed. No pertinent family history. Social History     Socioeconomic History    Marital status:      Spouse name: Not on file    Number of children: Not on file    Years of education: Not on file    Highest education level: Not on file   Occupational History    Not on file   Tobacco Use    Smoking status: Never Smoker    Smokeless tobacco: Never Used   Vaping Use    Vaping Use: Never used   Substance and Sexual Activity    Alcohol use: No    Drug use: No    Sexual activity: Not on file   Other Topics Concern    Not on file   Social History Narrative    Not on file     Social Determinants of Health     Financial Resource Strain:     Difficulty of Paying Living Expenses: Not on file   Food Insecurity:     Worried About [address] in the Last Year: Not on file    Chanell of Food in the Last Year: Not on file   Transportation Needs:     Lack of Transportation (Medical): Not on file    Lack of Transportation (Non-Medical):  Not on file Physical Activity:     Days of Exercise per Week: Not on file    Minutes of Exercise per Session: Not on file   Stress:     Feeling of Stress : Not on file   Social Connections:     Frequency of Communication with Friends and Family: Not on file    Frequency of Social Gatherings with Friends and Family: Not on file    Attends Taoist Services: Not on file    Active Member of [address] [place] [address] or Organizations: Not on file    Attends Club or Organization Meetings: Not on file    Marital Status: Not on file   Intimate Partner Violence:     Fear of Current or Ex-Partner: Not on file    Emotionally Abused: Not on file    Physically Abused: Not on file    Sexually Abused: Not on file   Housing Stability:     Unable to Pay for Housing in the Last Year: Not on file    Number of [place] in the Last Year: Not on file    Unstable Housing in the Last Year: Not on file         Review of Systems      ROS: 10 organs review of system is done including general, psychological, ENT, hematological, endocrine, respiratory, cardiovascular, gastrointestinal,musculoskeletal, neurological,  allergy and Immunology is done and is otherwise negative.     Current Outpatient Medications   Medication Sig Dispense Refill    fluticasone-vilanterol (BREO ELLIPTA) 100-25 MCG/INH AEPB inhaler Inhale 1 puff into the lungs daily 1 each 2    fluticasone (FLONASE) 50 MCG/ACT nasal spray 1 spray by Each Nostril route daily 16 g 3    hydrocortisone 2.5 % cream apply to armpits on the weekends      ketoconazole (NIZORAL) 2 % cream apply 1 Application as directed topically Monday through Friday, once per day      triamcinolone (KENALOG) 0.1 % cream apply 1 (ONE) application to affected area for two weeks then stop for at least one week before reapplying      Respiratory Therapy Supplies SHANIA Cpap Supplies 1 Device 0    ezetimibe (ZETIA) 10 MG tablet take 1 tablet by mouth at bedtime  3    pravastatin (PRAVACHOL) 20 MG tablet TAKE 1 TABLET BY MOUTH at bedtime  3    CPAP Machine MISC by Does not apply route Dispense new CPAP supplies 1 each 0    melatonin 3 MG TABS tablet Take 5 mg by mouth nightly as needed       warfarin (COUMADIN) 5 MG tablet Take 5 mg by mouth 5mg daily except Thursday and Sunday 7.5mg      tamsulosin (FLOMAX) 0.4 MG capsule Take 0.4 mg by mouth daily      carvedilol (COREG) 25 MG tablet Take 25 mg by mouth 2 times daily (with meals)      sertraline (ZOLOFT) 50 MG tablet Take 50 mg by mouth daily Takes 1.5 tablets a day      amLODIPine-valsartan-HCTZ -25 MG TABS Take by mouth       No current facility-administered medications for this visit. Objective:     Vitals:    12/20/21 1319 12/20/21 1325   BP: (!) 144/78 (!) 142/72   Site: Right Upper Arm Left Upper Arm   Position: Sitting Sitting   Cuff Size: Large Adult Medium Adult   Pulse: 89    Resp: 16    Temp: 97.2 °F (36.2 °C)    TempSrc: Infrared    SpO2: 95%    Weight: 221 lb (100.2 kg)    Height: 5' 10\" (1.778 m)          Physical Exam  Constitutional:       Appearance: He is well-developed. HENT:      Head: Normocephalic and atraumatic. Eyes:      General:         Left eye: No discharge. Conjunctiva/sclera: Conjunctivae normal.      Pupils: Pupils are equal, round, and reactive to light. Neck:      Vascular: No JVD. Cardiovascular:      Rate and Rhythm: Normal rate and regular rhythm. Heart sounds: Normal heart sounds. No murmur heard. No friction rub. No gallop. Pulmonary:      Effort: Pulmonary effort is normal. No respiratory distress. Breath sounds: Normal breath sounds. No wheezing or rales. Chest:      Chest wall: No tenderness. Abdominal:      General: Bowel sounds are normal. There is no distension. Palpations: Abdomen is soft. Tenderness: There is no abdominal tenderness. There is no rebound. Musculoskeletal:         General: No deformity. Cervical back: Normal range of motion and neck supple.       Right lower leg: No edema. Left lower leg: No edema. Skin:     General: Skin is warm and dry. Neurological:      Mental Status: He is alert and oriented to person, place, and time. Psychiatric:         Judgment: Judgment normal.         Imaging studies reviewed by me none  Lab results reviewed in chart  PFT November 2021, FEV1 59%, FEV1/FVC 0.80, air trapping, diffusion capacity normal, restrictive lung disease    Assessment and Plan       Diagnosis Orders   1. SOB (shortness of breath)  XR CHEST (2 VW)   2. Moderate persistent asthma without complication  CBC With Auto Differential    Allergen, Inhalant, Comprehensive Panel    fluticasone-vilanterol (BREO ELLIPTA) 100-25 MCG/INH AEPB inhaler   3. LLOYD (obstructive sleep apnea)     4. Restrictive lung disease     5. Class 1 obesity due to excess calories without serious comorbidity with body mass index (BMI) of 31.0 to 31.9 in adult       · Shortness of breath, likely due to underlying combined restrictive and obstructive disease, possible asthma, will obtain chest x-ray, CBC with differential, and asthma allergy profile.   We will start treatment trial with Breo, will consider methacholine challenge test.  If chest x-ray is abnormal, will consider CT chest.  · LLOYD, patient is compliant with treatment, continue same  · Weight loss is recommended      Orders Placed This Encounter   Procedures    XR CHEST (2 VW)     Standing Status:   Future     Standing Expiration Date:   12/20/2022    CBC With Auto Differential     Standing Status:   Future     Standing Expiration Date:   12/20/2022    Allergen, Inhalant, Comprehensive Panel     Standing Status:   Future     Standing Expiration Date:   12/20/2022     Orders Placed This Encounter   Medications    fluticasone-vilanterol (BREO ELLIPTA) 100-25 MCG/INH AEPB inhaler     Sig: Inhale 1 puff into the lungs daily     Dispense:  1 each     Refill:  2             Discussed with patient the importance of exercise and weight control and  overall health and well-being. Reviewed with the patient: current clinical status, medications, activities and diet. Side effects, adverse effects of the medication prescribed today, as well as treatment plan and result expectations have been discussed with the patient who expresses understanding and desires to proceed. Return in about 6 weeks (around 1/31/2022).       Mayda Patton MD

## 2021-12-31 LAB
ALLERGEN ASPERGILLUS ALTERNATA IGE: <0.1 KU/L
ALLERGEN ASPERGILLUS FUMIGATUS IGE: <0.1 KU/L
ALLERGEN BERMUDA GRASS IGE: 0.43 KU/L
ALLERGEN CAT DANDER IGE: <0.1 KU/L
ALLERGEN COMMON SHORT RAGWEED IGE: <0.1 KU/L
ALLERGEN COTTONWOOD: <0.1 KU/L
ALLERGEN COW EPITHELIA/DANDER IGE: <0.1 KU/L
ALLERGEN DOG DANDER IGE: <0.1 KU/L
ALLERGEN ELM IGE: <0.1 KU/L
ALLERGEN ENGLISH PLANTAIN: <0.1 KU/L
ALLERGEN GREER HOUSE DUST: <0.1 KU/L
ALLERGEN HORMODENDRUM HORDEI IGE: <0.1 KU/L
ALLERGEN HORSE DANDER: <0.1 KU/L
ALLERGEN JOHNSON GRASS IGE: 1.19 KU/L
ALLERGEN JUNE KENTUCKY BLUEGRASS: 2.77 KU/L
ALLERGEN LAMB'S QUARTERS: 0.14 KU/L
ALLERGEN MESQUITE IGE: <0.1 KU/L
ALLERGEN MITE DUST FARINAE IGE: 0.34 KU/L
ALLERGEN MITE DUST PTERONYSSINUS IGE: 0.39 KU/L
ALLERGEN MOUNTAIN CEDAR: <0.1 KU/L
ALLERGEN MUGWORT IGE: <0.1 KU/L
ALLERGEN OAK TREE IGE: <0.1 KU/L
ALLERGEN OLIVE TREE IGE: <0.1 KU/L
ALLERGEN PENICILLIUM NOTATUM: 0.19 KU/L
ALLERGEN PERENNIAL RYE GRASS IGE: 2.11 KU/L
ALLERGEN RUSSIAN THISTLE IGE: <0.1 KU/L
ALLERGEN SEE NOTE: ABNORMAL
ALLERGEN TIMOTHY GRASS: 2.32 KU/L
IGE: 85 KU/L

## 2022-01-13 ENCOUNTER — HOSPITAL ENCOUNTER (OUTPATIENT)
Dept: CARDIAC CATH/INVASIVE PROCEDURES | Age: 77
Discharge: HOME | End: 2022-01-13
Attending: INTERNAL MEDICINE | Admitting: INTERNAL MEDICINE
Payer: MEDICARE

## 2022-01-13 VITALS
RESPIRATION RATE: 14 BRPM | WEIGHT: 215 LBS | HEART RATE: 79 BPM | DIASTOLIC BLOOD PRESSURE: 71 MMHG | SYSTOLIC BLOOD PRESSURE: 124 MMHG | TEMPERATURE: 97.2 F | BODY MASS INDEX: 30.85 KG/M2 | OXYGEN SATURATION: 92 %

## 2022-01-13 PROBLEM — I48.92 ATRIAL FLUTTER (HCC): Status: RESOLVED | Noted: 2022-01-13 | Resolved: 2022-01-13

## 2022-01-13 PROBLEM — I48.92 ATRIAL FLUTTER (HCC): Status: ACTIVE | Noted: 2022-01-13

## 2022-01-13 PROCEDURE — 93005 ELECTROCARDIOGRAM TRACING: CPT | Performed by: INTERNAL MEDICINE

## 2022-01-13 PROCEDURE — 92960 CARDIOVERSION ELECTRIC EXT: CPT

## 2022-01-13 PROCEDURE — 6360000002 HC RX W HCPCS

## 2022-01-13 PROCEDURE — 6360000002 HC RX W HCPCS: Performed by: INTERNAL MEDICINE

## 2022-01-13 RX ORDER — AMLODIPINE BESYLATE 5 MG/1
5 TABLET ORAL DAILY
COMMUNITY

## 2022-01-13 RX ORDER — PROPOFOL 10 MG/ML
INJECTION, EMULSION INTRAVENOUS
Status: COMPLETED | OUTPATIENT
Start: 2022-01-13 | End: 2022-01-13

## 2022-01-13 RX ORDER — SODIUM CHLORIDE 0.9 % (FLUSH) 0.9 %
5-40 SYRINGE (ML) INJECTION EVERY 12 HOURS SCHEDULED
Status: DISCONTINUED | OUTPATIENT
Start: 2022-01-13 | End: 2022-01-13 | Stop reason: HOSPADM

## 2022-01-13 RX ORDER — DEXTROSE AND SODIUM CHLORIDE 5; .45 G/100ML; G/100ML
INJECTION, SOLUTION INTRAVENOUS CONTINUOUS
Status: DISCONTINUED | OUTPATIENT
Start: 2022-01-13 | End: 2022-01-13 | Stop reason: HOSPADM

## 2022-01-13 RX ORDER — SERTRALINE HYDROCHLORIDE 100 MG/1
100 TABLET, FILM COATED ORAL DAILY
COMMUNITY

## 2022-01-13 RX ORDER — SODIUM CHLORIDE 9 MG/ML
25 INJECTION, SOLUTION INTRAVENOUS PRN
Status: DISCONTINUED | OUTPATIENT
Start: 2022-01-13 | End: 2022-01-13 | Stop reason: HOSPADM

## 2022-01-13 RX ORDER — SODIUM CHLORIDE 0.9 % (FLUSH) 0.9 %
5-40 SYRINGE (ML) INJECTION PRN
Status: DISCONTINUED | OUTPATIENT
Start: 2022-01-13 | End: 2022-01-13 | Stop reason: HOSPADM

## 2022-01-13 RX ORDER — FLECAINIDE ACETATE 100 MG/1
100 TABLET ORAL 2 TIMES DAILY
COMMUNITY

## 2022-01-13 RX ADMIN — PROPOFOL 80 MG: 10 INJECTION, EMULSION INTRAVENOUS at 09:48

## 2022-01-13 NOTE — PROCEDURES
Direct Current Cardioversion  Indication: symptomatic atypical atrial flutter  Sedation: 80 mg propofol  Complications: none    Staff: myself, nursing, respiratory therapy    Anesthetic: Propofol, bolus dose of 80 mg intravenously    DCC: 125 Joule, biphasic, AP patch position    Complication:  NONE, O2 remained >92%. Did require ambu baggng about 2-3 minutes. No cardiac, neurologic or respiratory complications    Result:  Successful conversion of atrial flutter to dual chamber pacing    ECG: dual chamber pacing    Pacemaker check: dual chamber pacing. Both leads remain with normal parameters. Abigail Vazquez MD [facility]

## 2022-01-13 NOTE — PROGRESS NOTES
Discharge instructions reviewed with patient and verbalization of understanding occurred. IV's removed and patient discharged home in care of family.

## 2022-01-13 NOTE — DISCHARGE SUMMARY
[facility] Heart dDischarge note      Patient:  Josee Sparrow  YOB: 1945  MRN: [id number]   Acct: [de-identified]  Admit Date/discharge date:  1/13/2022  Primary Cardiologist:Dr. Agustin Ambriz     Reason for outpatient procedure: Symptomatic atypical atrial flutter    Rounding Cardiologist: Becky Perdomo MD     Procedure: Direct-current cardioversion. : Garett Tellez. Shilpa Gutierrez MD.  Complications: None    ECG after cardioversion: Dual-chamber pacing AV interval 256    Principal Problem (Resolved):    Atrial flutter ([address]  Active Problems:    Essential hypertension    Mitral valve disease    Mitral valve replaced    Pacemaker        Impression/Plan:     1. Atypical atrial flutter: Uncomplicated conversion to dual-chamber pacing  2. Hypertension: Controlled  3. Anticoagulation: Continue Coumadin  4. Home today    Chief Complaint/Active Symptoms: No angina/dyspnea/palpitations. Feels well s/p dcc.  No cardiovascular or neurologic compromise from anesthesia          Objective:   Vitals:    01/13/22 0949 01/13/22 0951 01/13/22 0956 01/13/22 0957   BP: (!) 147/86 135/82 125/71 125/71   Pulse: 76 66 78 63   Resp: 19 22 22 22   Temp:       TempSrc:       SpO2: 97% 90% 95% 96%   Weight:          Wt Readings from Last 3 Encounters:   01/13/22 215 lb (97.5 kg)   12/20/21 221 lb (100.2 kg)   11/02/21 217 lb 9.6 oz (98.7 kg)       TELEMETRY: paced                            Rhythm Strip: dual chamber paced    Physical Exam:  General Appearance: alert and oriented to person, place and time, in no acute distress  Cardiovascular: normal rate, regular rhythm, normal S1 and S2, no murmurs, rubs, clicks, or gallops,  no JVD, crisp valve sounds  Pulmonary/Chest: clear to auscultation bilaterally- no wheezes, rales or rhonchi, normal air movement, no respiratory distress  Abdomen: soft, non-tender, non-distended, normal bowel sounds, no masses   Extremities: no cyanosis, clubbing or edema  Skin: warm and dry, no rash or erythema  Eyes: EOMI  Neck: supple and non-tender without mass, no thyromegaly   Neurological: alert, oriented, normal speech, no focal findings or movement disorder noted    Allergies:   Allergies   Allergen Reactions    Crestor [Rosuvastatin] Other (See Comments)    Statins Other (See Comments)     Statins do not work     Procainamide Rash        Medications:      dextrose 5 % and 0.45 % NaCl            Diagnostics:  EKG: Prior to cardioversion: Atypical atrial flutter with continuous V pacing            After cardioversion: Dual-chamber pacing rate 60                  Electronically signed by Bernardino Shaw MD on 1/13/2022 at 10:01 AM

## 2022-01-13 NOTE — H&P
(V15.89) (P80.190)   · Hyperlipidemia (272.4) (E78.5)   · 1997 trivial coronary irreg in cx. normal LV, severe MR w/subsequent MVR. · Hyperopia with presbyopia (367.0,367.4) (H52.00,H52. 4)   · Hypertension (401.9) (I10)   · MGD (meibomian gland disease) (373.00) (F44.901)   · Mitral valve disease (394.9) (I05.9)   · 12/7/2021 echo LVEF 60%. LVH. RVSP 44 mm. 2+ AI unchanged. Normal function      mechanical mitral valve       History of mitral valve prolapse with mitral valve replacement mechanical [place]          valve 1998 secondary to severe MR and endocarditis  History of mitral valve prolapse with mitral valve replacement mechanical [place]   valve 1998 secondary to severe MR and endocarditis  Late 1997 MI- Felt due to emboli from mitral valve vegetation. 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 normal LV, severe MR w/subsequent MVR. · History of Cardioversion   · History of Coronary Artery Surgery   · History of Inguinal Hernia Repair   · History of Mitral valve replacement   · 7/2 1998 Due to severe MR from MVP complicated by endocarditis. [place]       012 echo with normal function MV and 1+ AI.  LV 60%   · History of Surgery   · percutaneous heart cath    Past Medical History  Problems    · History of Acute myocardial infarction (410.90) (I21.9)   · Resolved Date: 22 Dec 2021   · 1997 Felt due to emboli from mitral valve vegetation. · Arthritis (716.90) (M19.90)   · History of Cataract of left eye (366.9) (H26.9)   · History of Cataract of right eye (366.9) (H26.9)   · History of mitral valve insufficiency (V12.59) (Z86.79)   · Resolved Date: 22 Dec 2021   · History of snoring (V15.89) (V01.175)   · Resolved Date: 29 Nov 2021   · Hypertension (401.9) (I10)   · History of Leg fatigue (729.89) (R29.898)   · Resolved Date: 29 Nov 2021    Current Meds    Medication Name Instruction   amLODIPine Besylate 5 MG Oral Tablet TAKE 1 TABLET DAILY AS DIRECTED. Carvedilol 25 MG Oral Tablet TAKE 1 TABLET TWICE DAILY. Cefuroxime Axetil 250 MG Oral Tablet TAKE 1 TABLET EVERY 12 HOURS DAILY. CeraVe CREA APPLY SPARINGLY TO AFFECTED AREA(S) 3 TIMES A DAY   Ezetimibe 10 MG Oral Tablet TAKE 1 TABLET AT BEDTIME. Flecainide Acetate 100 MG Oral Tablet TAKE 1 TABLET EVERY 12 HOURS DAILY. Flonase 50 MCG/ACT SUSP USE 1 SPRAY IN EACH NOSTRIL ONCE DAILY AS NEEDED   Melatonin 5 MG Oral Capsule TAKE 1 CAPSULE Bedtime PRN   Pravastatin Sodium 20 MG Oral Tablet TAKE 1 TABLET DAILY. Sertraline HCl - 100 MG Oral Tablet TAKE 1 TABLET EVERY DAY   Vitamin B12 TABS TAKE 1 TABLET DAILY AS DIRECTED. Vitamin D TABS TAKE 1 TABLET DAILY. Warfarin Sodium 5 MG Oral Tablet 1-2 TABLETs DAILY OR AS DIRECTED TO MAINTAIN THERAPEUTIC INR. Reviewed medications with patient. Patient did not bring in RX bottles or list from home. CPalmkathy SANTANA      Allergies  Medication    · atorvastatin  Myalgia; Recorded By: Caity Richardson; 11/17/2021 2:50:08 PM   · Crestor  Myalgia; Recorded By: Caity Richardson; 11/17/2021 2:50:08 PM   · Lipitor TABS  Myalgia;  Recorded By: Caity Richardson; 11/17/2021 2:50:08 PM   · procainamide  Rash; Recorded By: Caity Richardson; 11/17/2021 2:50:08 PM    Family History  Mother    · Family history of cardiac disorder (V17.49) (Z82.49)   · Family history of cataracts (V19.19) (Z80.46)  Father    · Family history of cardiac disorder (V17.49) (Z82.49)   · Family history of malignant neoplasm of stomach (V16.0) (Z80.0)  Sibling    · No pertinent family history    Social History  Problems    ·    · Never a smoker   · No alcohol use   · No caffeine use   · No illicit drug use

## 2022-01-14 LAB
EKG ATRIAL RATE: 312 BPM
EKG ATRIAL RATE: 77 BPM
EKG P AXIS: 27 DEGREES
EKG P AXIS: 79 DEGREES
EKG P-R INTERVAL: 248 MS
EKG P-R INTERVAL: 358 MS
EKG Q-T INTERVAL: 526 MS
EKG Q-T INTERVAL: 538 MS
EKG QRS DURATION: 212 MS
EKG QRS DURATION: 212 MS
EKG QTC CALCULATION (BAZETT): 595 MS
EKG QTC CALCULATION (BAZETT): 616 MS
EKG R AXIS: -81 DEGREES
EKG R AXIS: -81 DEGREES
EKG T AXIS: 83 DEGREES
EKG T AXIS: 83 DEGREES
EKG VENTRICULAR RATE: 77 BPM
EKG VENTRICULAR RATE: 79 BPM

## 2022-02-10 ENCOUNTER — HOSPITAL ENCOUNTER (OUTPATIENT)
Dept: CARDIOLOGY | Age: 77
Discharge: HOME | End: 2022-02-10
Payer: MEDICARE

## 2022-02-10 PROCEDURE — 93280 PM DEVICE PROGR EVAL DUAL: CPT

## 2022-05-12 ENCOUNTER — HOSPITAL ENCOUNTER (OUTPATIENT)
Dept: CARDIOLOGY | Age: 77
Discharge: HOME | End: 2022-05-12
Payer: MEDICARE

## 2022-05-12 PROCEDURE — 93296 REM INTERROG EVL PM/IDS: CPT

## 2022-07-30 ENCOUNTER — TELEPHONE ENCOUNTER (OUTPATIENT)
Age: 77
End: 2022-07-30

## 2022-07-31 ENCOUNTER — TELEPHONE ENCOUNTER (OUTPATIENT)
Age: 77
End: 2022-07-31

## 2022-08-26 ENCOUNTER — HOSPITAL ENCOUNTER (OUTPATIENT)
Dept: CARDIOLOGY | Age: 77
Discharge: HOME | End: 2022-08-26
Payer: MEDICARE

## 2022-08-26 PROCEDURE — 93296 REM INTERROG EVL PM/IDS: CPT

## 2022-11-30 ENCOUNTER — HOSPITAL ENCOUNTER (OUTPATIENT)
Dept: CARDIOLOGY | Age: 77
Discharge: HOME | End: 2022-11-30
Payer: MEDICARE

## 2022-11-30 PROCEDURE — 93280 PM DEVICE PROGR EVAL DUAL: CPT

## 2023-01-04 LAB
INR BLD: 2.6 (ref 0.9–1.1)
PROTHROMBIN TIME: 30.1 SEC (ref 9.8–13.4)

## 2023-02-27 ENCOUNTER — HOSPITAL ENCOUNTER (OUTPATIENT)
Dept: CARDIOLOGY | Age: 78
Discharge: HOME | End: 2023-02-27
Payer: MEDICARE

## 2023-02-27 PROCEDURE — 93296 REM INTERROG EVL PM/IDS: CPT

## 2023-03-01 LAB
INR IN PPP BY COAGULATION ASSAY: 2.2 (ref 0.9–1.1)
PROTHROMBIN TIME (PT) IN PPP BY COAGULATION ASSAY: 26.2 SEC (ref 9.8–13.4)

## 2023-03-13 LAB
ANION GAP IN SER/PLAS: 10 MMOL/L (ref 10–20)
CALCIUM (MG/DL) IN SER/PLAS: 9.3 MG/DL (ref 8.6–10.3)
CARBON DIOXIDE, TOTAL (MMOL/L) IN SER/PLAS: 31 MMOL/L (ref 21–32)
CHLORIDE (MMOL/L) IN SER/PLAS: 102 MMOL/L (ref 98–107)
CREATININE (MG/DL) IN SER/PLAS: 1.08 MG/DL (ref 0.5–1.3)
GFR MALE: 70 ML/MIN/1.73M2
GLUCOSE (MG/DL) IN SER/PLAS: 144 MG/DL (ref 74–99)
INR BLD: 3.5 (ref 0.9–1.1)
INR IN PPP BY COAGULATION ASSAY: 3.5 (ref 0.9–1.1)
POTASSIUM (MMOL/L) IN SER/PLAS: 3.8 MMOL/L (ref 3.5–5.3)
PROTHROMBIN TIME (PT) IN PPP BY COAGULATION ASSAY: 41.4 SEC (ref 9.8–13.4)
PROTHROMBIN TIME: 41.4 SEC (ref 9.8–13.4)
SODIUM (MMOL/L) IN SER/PLAS: 139 MMOL/L (ref 136–145)
UREA NITROGEN (MG/DL) IN SER/PLAS: 16 MG/DL (ref 6–23)

## 2023-03-27 LAB
INR BLD: 3.3 (ref 0.9–1.1)
INR IN PPP BY COAGULATION ASSAY: 3.3 (ref 0.9–1.1)
PROTHROMBIN TIME (PT) IN PPP BY COAGULATION ASSAY: 38.2 SEC (ref 9.8–13.4)
PROTHROMBIN TIME: 38.2 SEC (ref 9.8–13.4)

## 2023-04-17 LAB
INR BLD: 3.5 (ref 0.9–1.1)
INR IN PPP BY COAGULATION ASSAY: 3.5 (ref 0.9–1.1)
PROTHROMBIN TIME (PT) IN PPP BY COAGULATION ASSAY: 40.8 SEC (ref 9.8–13.4)
PROTHROMBIN TIME: 40.8 SEC (ref 9.8–13.4)

## 2023-04-24 LAB
B-TYPE NATRIURETIC PEPTIDE: 93 PG/ML (ref 0–99)
ERYTHROCYTE DISTRIBUTION WIDTH (RATIO) BY AUTOMATED COUNT: 13.7 % (ref 11.5–14.5)
ERYTHROCYTE MEAN CORPUSCULAR HEMOGLOBIN CONCENTRATION (G/DL) BY AUTOMATED: 33.8 G/DL (ref 32–36)
ERYTHROCYTE MEAN CORPUSCULAR VOLUME (FL) BY AUTOMATED COUNT: 95 FL (ref 80–100)
ERYTHROCYTE [DISTWIDTH] IN BLOOD BY AUTOMATED COUNT: 13.7 % (ref 11.5–14)
ERYTHROCYTES (10*6/UL) IN BLOOD BY AUTOMATED COUNT: 4.4 X10E12/L (ref 4.5–5.9)
HCT VFR BLD CALC: 41.7 % (ref 41–52)
HEMATOCRIT (%) IN BLOOD BY AUTOMATED COUNT: 41.7 % (ref 41–52)
HEMOGLOBIN (G/DL) IN BLOOD: 14.1 G/DL (ref 13.5–17.5)
HEMOGLOBIN: 14.1 G/DL (ref 13.5–17)
LEUKOCYTES (10*3/UL) IN BLOOD BY AUTOMATED COUNT: 7.8 X10E9/L (ref 4.4–11.3)
MCHC RBC AUTO-ENTMCNC: 33.8 G/DL (ref 32–36)
MCV RBC AUTO: 95 FL (ref 80–100)
NATRIURETIC PEPTIDE B (PG/ML) IN SER/PLAS: 93 PG/ML (ref 0–99)
PLATELET # BLD: 160 X10E9/L (ref 150–450)
PLATELETS (10*3/UL) IN BLOOD AUTOMATED COUNT: 160 X10E9/L (ref 150–450)
RBC # BLD: 4.4 X10E12/L (ref 4.5–5.9)
WBC: 7.8 X10E9/L (ref 4.4–11.3)

## 2023-05-09 LAB
INR BLD: 2.9 (ref 0.9–1.1)
INR IN PPP BY COAGULATION ASSAY: 2.9 (ref 0.9–1.1)
PROTHROMBIN TIME (PT) IN PPP BY COAGULATION ASSAY: 34.2 SEC (ref 9.8–13.4)
PROTHROMBIN TIME: 34.2 SEC (ref 9.8–13.4)

## 2023-05-31 LAB
INR BLD: 2.9 (ref 0.9–1.1)
INR IN PPP BY COAGULATION ASSAY: 2.9 (ref 0.9–1.1)
PROTHROMBIN TIME (PT) IN PPP BY COAGULATION ASSAY: 33.7 SEC (ref 9.8–13.4)
PROTHROMBIN TIME: 33.7 SEC (ref 9.8–13.4)

## 2023-06-02 LAB
ALANINE AMINOTRANSFERASE (SGPT) (U/L) IN SER/PLAS: 20 U/L (ref 10–52)
ALBUMIN (G/DL) IN SER/PLAS: 4.3 G/DL (ref 3.4–5)
ALKALINE PHOSPHATASE (U/L) IN SER/PLAS: 63 U/L (ref 33–136)
ANION GAP IN SER/PLAS: 11 MMOL/L (ref 10–20)
ASPARTATE AMINOTRANSFERASE (SGOT) (U/L) IN SER/PLAS: 21 U/L (ref 9–39)
BILIRUBIN TOTAL (MG/DL) IN SER/PLAS: 0.8 MG/DL (ref 0–1.2)
CALCIDIOL (25 OH VITAMIN D3) (NG/ML) IN SER/PLAS: 46 NG/ML
CALCIUM (MG/DL) IN SER/PLAS: 9.4 MG/DL (ref 8.6–10.3)
CARBON DIOXIDE, TOTAL (MMOL/L) IN SER/PLAS: 29 MMOL/L (ref 21–32)
CHLORIDE (MMOL/L) IN SER/PLAS: 102 MMOL/L (ref 98–107)
CHOLESTEROL (MG/DL) IN SER/PLAS: 159 MG/DL (ref 0–199)
CHOLESTEROL IN HDL (MG/DL) IN SER/PLAS: 48.7 MG/DL
CHOLESTEROL/HDL RATIO: 3.3
COBALAMIN (VITAMIN B12) (PG/ML) IN SER/PLAS: 250 PG/ML (ref 211–911)
CREATININE (MG/DL) IN SER/PLAS: 0.97 MG/DL (ref 0.5–1.3)
ERYTHROCYTE DISTRIBUTION WIDTH (RATIO) BY AUTOMATED COUNT: 14.1 % (ref 11.5–14.5)
ERYTHROCYTE MEAN CORPUSCULAR HEMOGLOBIN CONCENTRATION (G/DL) BY AUTOMATED: 33.5 G/DL (ref 32–36)
ERYTHROCYTE MEAN CORPUSCULAR VOLUME (FL) BY AUTOMATED COUNT: 95 FL (ref 80–100)
ERYTHROCYTES (10*6/UL) IN BLOOD BY AUTOMATED COUNT: 4.2 X10E12/L (ref 4.5–5.9)
ESTIMATED AVERAGE GLUCOSE FOR HBA1C: 163 MG/DL
GFR MALE: 80 ML/MIN/1.73M2
GLUCOSE (MG/DL) IN SER/PLAS: 141 MG/DL (ref 74–99)
HEMATOCRIT (%) IN BLOOD BY AUTOMATED COUNT: 39.7 % (ref 41–52)
HEMOGLOBIN (G/DL) IN BLOOD: 13.3 G/DL (ref 13.5–17.5)
HEMOGLOBIN A1C/HEMOGLOBIN TOTAL IN BLOOD: 7.3 %
LDL: 98 MG/DL (ref 0–99)
LEUKOCYTES (10*3/UL) IN BLOOD BY AUTOMATED COUNT: 4.7 X10E9/L (ref 4.4–11.3)
PLATELETS (10*3/UL) IN BLOOD AUTOMATED COUNT: 146 X10E9/L (ref 150–450)
POTASSIUM (MMOL/L) IN SER/PLAS: 3.8 MMOL/L (ref 3.5–5.3)
PROTEIN TOTAL: 7.2 G/DL (ref 6.4–8.2)
SODIUM (MMOL/L) IN SER/PLAS: 138 MMOL/L (ref 136–145)
TRIGLYCERIDE (MG/DL) IN SER/PLAS: 64 MG/DL (ref 0–149)
UREA NITROGEN (MG/DL) IN SER/PLAS: 18 MG/DL (ref 6–23)
VLDL: 13 MG/DL (ref 0–40)

## 2023-06-28 LAB
INR BLD: 2.7 (ref 0.9–1.1)
INR IN PPP BY COAGULATION ASSAY: 2.7 (ref 0.9–1.1)
PROTHROMBIN TIME (PT) IN PPP BY COAGULATION ASSAY: 30.3 SEC (ref 9.8–12.8)
PROTHROMBIN TIME: 30.3 SEC (ref 9.8–12.8)

## 2023-07-27 LAB
INR BLD: 2.6 (ref 0.9–1.1)
INR IN PPP BY COAGULATION ASSAY: 2.6 (ref 0.9–1.1)
PROTHROMBIN TIME (PT) IN PPP BY COAGULATION ASSAY: 30 SEC (ref 9.8–12.8)
PROTHROMBIN TIME: 30 SEC (ref 9.8–12.8)

## 2023-08-21 ENCOUNTER — OFFICE VISIT (OUTPATIENT)
Dept: PRIMARY CARE | Facility: CLINIC | Age: 78
End: 2023-08-21
Payer: MEDICARE

## 2023-08-21 VITALS
SYSTOLIC BLOOD PRESSURE: 118 MMHG | TEMPERATURE: 97.9 F | HEART RATE: 86 BPM | BODY MASS INDEX: 33.3 KG/M2 | RESPIRATION RATE: 20 BRPM | OXYGEN SATURATION: 95 % | WEIGHT: 219 LBS | DIASTOLIC BLOOD PRESSURE: 66 MMHG

## 2023-08-21 DIAGNOSIS — J20.8 ACUTE BRONCHITIS DUE TO OTHER SPECIFIED ORGANISMS: Primary | ICD-10-CM

## 2023-08-21 PROBLEM — Z95.0 PRESENCE OF CARDIAC PACEMAKER: Status: ACTIVE | Noted: 2018-01-24

## 2023-08-21 PROBLEM — Z77.098 AGENT ORANGE EXPOSURE: Status: ACTIVE | Noted: 2023-08-21

## 2023-08-21 PROBLEM — N52.9 ERECTILE DYSFUNCTION: Status: ACTIVE | Noted: 2023-08-21

## 2023-08-21 PROBLEM — C84.A0 CUTANEOUS T-CELL LYMPHOMA (MULTI): Status: ACTIVE | Noted: 2023-08-21

## 2023-08-21 PROBLEM — F32.5 MAJOR DEPRESSIVE DISORDER WITH SINGLE EPISODE, IN FULL REMISSION (CMS-HCC): Status: ACTIVE | Noted: 2023-08-21

## 2023-08-21 PROBLEM — G47.33 OSA (OBSTRUCTIVE SLEEP APNEA): Status: ACTIVE | Noted: 2018-02-27

## 2023-08-21 PROBLEM — N40.1 BPH WITH OBSTRUCTION/LOWER URINARY TRACT SYMPTOMS: Status: ACTIVE | Noted: 2023-08-21

## 2023-08-21 PROBLEM — I48.4 ATYPICAL ATRIAL FLUTTER (MULTI): Status: ACTIVE | Noted: 2023-08-21

## 2023-08-21 PROBLEM — N13.8 BPH WITH OBSTRUCTION/LOWER URINARY TRACT SYMPTOMS: Status: ACTIVE | Noted: 2023-08-21

## 2023-08-21 PROCEDURE — 1159F MED LIST DOCD IN RCRD: CPT | Performed by: FAMILY MEDICINE

## 2023-08-21 PROCEDURE — 1160F RVW MEDS BY RX/DR IN RCRD: CPT | Performed by: FAMILY MEDICINE

## 2023-08-21 PROCEDURE — 99214 OFFICE O/P EST MOD 30 MIN: CPT | Performed by: FAMILY MEDICINE

## 2023-08-21 PROCEDURE — 1036F TOBACCO NON-USER: CPT | Performed by: FAMILY MEDICINE

## 2023-08-21 RX ORDER — EZETIMIBE 10 MG/1
1 TABLET ORAL NIGHTLY
COMMUNITY
Start: 2019-07-03 | End: 2023-10-30 | Stop reason: SDUPTHER

## 2023-08-21 RX ORDER — PRAVASTATIN SODIUM 20 MG/1
1 TABLET ORAL DAILY
COMMUNITY
Start: 2019-07-10 | End: 2023-10-30 | Stop reason: SDUPTHER

## 2023-08-21 RX ORDER — FUROSEMIDE 20 MG/1
1 TABLET ORAL DAILY
COMMUNITY
Start: 2022-04-18 | End: 2023-10-02

## 2023-08-21 RX ORDER — METFORMIN HYDROCHLORIDE 500 MG/1
1 TABLET ORAL
COMMUNITY
Start: 2022-02-25 | End: 2024-06-06 | Stop reason: SDUPTHER

## 2023-08-21 RX ORDER — CERAMIDE 1,3,6-II/SALICYLIC/B3
CLEANSER (ML) TOPICAL
COMMUNITY
End: 2023-10-17

## 2023-08-21 RX ORDER — ACETAMINOPHEN 500 MG
TABLET ORAL
COMMUNITY
End: 2023-10-02

## 2023-08-21 RX ORDER — VALSARTAN 160 MG/1
1 TABLET ORAL DAILY
COMMUNITY
End: 2023-10-02

## 2023-08-21 RX ORDER — LANOLIN ALCOHOL/MO/W.PET/CERES
100 CREAM (GRAM) TOPICAL DAILY
COMMUNITY

## 2023-08-21 RX ORDER — AMLODIPINE BESYLATE 10 MG/1
10 TABLET ORAL
COMMUNITY
Start: 2023-06-13 | End: 2023-10-30 | Stop reason: SDUPTHER

## 2023-08-21 RX ORDER — TAMSULOSIN HYDROCHLORIDE 0.4 MG/1
0.4 CAPSULE ORAL DAILY
COMMUNITY
End: 2023-11-01 | Stop reason: SDUPTHER

## 2023-08-21 RX ORDER — CARVEDILOL 25 MG/1
1 TABLET ORAL 2 TIMES DAILY
COMMUNITY
Start: 2016-06-14 | End: 2023-10-30 | Stop reason: SDUPTHER

## 2023-08-21 RX ORDER — FLUTICASONE PROPIONATE 50 MCG
1 SPRAY, SUSPENSION (ML) NASAL DAILY
COMMUNITY

## 2023-08-21 RX ORDER — AZITHROMYCIN 250 MG/1
TABLET, FILM COATED ORAL
Qty: 6 TABLET | Refills: 0 | Status: SHIPPED | OUTPATIENT
Start: 2023-08-21 | End: 2023-08-26

## 2023-08-21 RX ORDER — WARFARIN SODIUM 5 MG/1
1-2 TABLET ORAL DAILY
COMMUNITY
Start: 2021-11-29 | End: 2023-10-02

## 2023-08-21 RX ORDER — ALBUTEROL SULFATE 90 UG/1
2 AEROSOL, METERED RESPIRATORY (INHALATION) EVERY 4 HOURS PRN
Qty: 6.7 G | Refills: 0 | Status: SHIPPED | OUTPATIENT
Start: 2023-08-21 | End: 2023-10-02

## 2023-08-21 RX ORDER — SERTRALINE HYDROCHLORIDE 100 MG/1
1 TABLET, FILM COATED ORAL DAILY
COMMUNITY
Start: 2021-12-14 | End: 2024-03-15 | Stop reason: SDUPTHER

## 2023-08-21 RX ORDER — ACETAMINOPHEN 500 MG
1 TABLET ORAL DAILY
COMMUNITY
End: 2023-10-02

## 2023-08-21 ASSESSMENT — ENCOUNTER SYMPTOMS
FEVER: 0
NAUSEA: 0
COUGH: 1
CHILLS: 1
WHEEZING: 1
SHORTNESS OF BREATH: 1
MYALGIAS: 0
HEADACHES: 0
SORE THROAT: 0
VOMITING: 0
CONSTIPATION: 0
DIARRHEA: 0
DIFFICULTY URINATING: 0

## 2023-08-21 NOTE — ASSESSMENT & PLAN NOTE
Advise pt to take meds as directed  Pt declined covid testing today  Rest and increase flds  F/up if no improvement, go to ER if worsening symptoms

## 2023-08-21 NOTE — PROGRESS NOTES
Subjective   Patient ID: Mando Tay is a 78 y.o. male who presents for Cough.    Cough  This is a new problem. The current episode started 1 to 4 weeks ago (1 week). The cough is Productive of sputum. Associated symptoms include chills, shortness of breath and wheezing. Pertinent negatives include no ear pain, fever, headaches, myalgias, postnasal drip or sore throat. The symptoms are aggravated by lying down. He has tried OTC cough suppressant for the symptoms. The treatment provided mild relief. His past medical history is significant for bronchitis.        Review of Systems   Constitutional:  Positive for chills. Negative for fever.   HENT:  Negative for congestion, ear pain, postnasal drip and sore throat.    Respiratory:  Positive for cough, shortness of breath and wheezing.         Duration 1 wk  Has not done home covid test. No family ill. No travel.  Has tried tylenol and coricidan hbp   Gastrointestinal:  Negative for constipation, diarrhea, nausea and vomiting.   Genitourinary:  Negative for difficulty urinating.   Musculoskeletal:  Negative for myalgias.   Neurological:  Negative for headaches.       Objective   /66   Pulse 86   Temp 36.6 °C (97.9 °F) (Temporal)   Resp 20   Wt 99.3 kg (219 lb)   SpO2 95%   BMI 33.30 kg/m²     Physical Exam  Vitals and nursing note reviewed.   Constitutional:       General: He is not in acute distress.     Appearance: Normal appearance.   HENT:      Head: Normocephalic and atraumatic.      Right Ear: Tympanic membrane, ear canal and external ear normal.      Left Ear: Tympanic membrane, ear canal and external ear normal.      Nose: Nose normal.      Mouth/Throat:      Mouth: Mucous membranes are moist.      Pharynx: Oropharynx is clear.   Cardiovascular:      Rate and Rhythm: Normal rate and regular rhythm.      Heart sounds: Murmur heard.      Comments: mechanical valve  Pulmonary:      Effort: Pulmonary effort is normal. No respiratory distress.       Breath sounds: Wheezing and rhonchi present.      Comments: Rare wheeze. Rhonchi clears with cough  Musculoskeletal:      Cervical back: Neck supple.   Lymphadenopathy:      Cervical: No cervical adenopathy.   Skin:     General: Skin is warm and dry.   Neurological:      General: No focal deficit present.      Mental Status: He is alert.         Assessment/Plan   Problem List Items Addressed This Visit       Acute bronchitis due to other specified organisms - Primary     Advise pt to take meds as directed  Pt declined covid testing today  Rest and increase flds  F/up if no improvement, go to ER if worsening symptoms         Relevant Medications    azithromycin (Zithromax) 250 mg tablet    albuterol (Proventil HFA) 90 mcg/actuation inhaler

## 2023-08-24 LAB
INR BLD: 3.2 (ref 0.9–1.1)
INR IN PPP BY COAGULATION ASSAY: 3.2 (ref 0.9–1.1)
PROTHROMBIN TIME (PT) IN PPP BY COAGULATION ASSAY: 36.2 SEC (ref 9.8–12.8)
PROTHROMBIN TIME: 36.2 SEC (ref 9.8–12.8)

## 2023-08-31 LAB
INR BLD: 5.5 (ref 0.9–1.1)
INR IN PPP BY COAGULATION ASSAY: 5.5 (ref 0.9–1.1)
PROTHROMBIN TIME (PT) IN PPP BY COAGULATION ASSAY: 63.2 SEC (ref 9.8–12.8)
PROTHROMBIN TIME: 63.2 SEC (ref 9.8–12.8)

## 2023-09-07 LAB
ALANINE AMINOTRANSFERASE (SGPT) (U/L) IN SER/PLAS: 22 U/L (ref 10–52)
ALBUMIN (G/DL) IN SER/PLAS: 4.3 G/DL (ref 3.4–5)
ALKALINE PHOSPHATASE (U/L) IN SER/PLAS: 62 U/L (ref 33–136)
ANION GAP IN SER/PLAS: 10 MMOL/L (ref 10–20)
ASPARTATE AMINOTRANSFERASE (SGOT) (U/L) IN SER/PLAS: 22 U/L (ref 9–39)
BILIRUBIN TOTAL (MG/DL) IN SER/PLAS: 1.1 MG/DL (ref 0–1.2)
CALCIUM (MG/DL) IN SER/PLAS: 9.5 MG/DL (ref 8.6–10.3)
CARBON DIOXIDE, TOTAL (MMOL/L) IN SER/PLAS: 29 MMOL/L (ref 21–32)
CHLORIDE (MMOL/L) IN SER/PLAS: 103 MMOL/L (ref 98–107)
COBALAMIN (VITAMIN B12) (PG/ML) IN SER/PLAS: 413 PG/ML (ref 211–911)
CREATININE (MG/DL) IN SER/PLAS: 1.22 MG/DL (ref 0.5–1.3)
ESTIMATED AVERAGE GLUCOSE FOR HBA1C: 146 MG/DL
GFR MALE: 61 ML/MIN/1.73M2
GLUCOSE (MG/DL) IN SER/PLAS: 124 MG/DL (ref 74–99)
HEMOGLOBIN A1C/HEMOGLOBIN TOTAL IN BLOOD: 6.7 %
POTASSIUM (MMOL/L) IN SER/PLAS: 3.9 MMOL/L (ref 3.5–5.3)
PROTEIN TOTAL: 7.1 G/DL (ref 6.4–8.2)
SODIUM (MMOL/L) IN SER/PLAS: 138 MMOL/L (ref 136–145)
UREA NITROGEN (MG/DL) IN SER/PLAS: 19 MG/DL (ref 6–23)

## 2023-09-14 LAB
INR BLD: 1.9 (ref 0.9–1.1)
INR IN PPP BY COAGULATION ASSAY: 1.9 (ref 0.9–1.1)
PROTHROMBIN TIME (PT) IN PPP BY COAGULATION ASSAY: 22.1 SEC (ref 9.8–12.8)
PROTHROMBIN TIME: 22.1 SEC (ref 9.8–12.8)

## 2023-09-19 ENCOUNTER — HOSPITAL ENCOUNTER (OUTPATIENT)
Dept: CARDIOLOGY | Age: 78
Discharge: HOME | End: 2023-09-19
Payer: MEDICARE

## 2023-09-19 PROCEDURE — 93296 REM INTERROG EVL PM/IDS: CPT

## 2023-09-28 LAB
INR BLD: 3.7 (ref 0.9–1.1)
INR IN PPP BY COAGULATION ASSAY: 3.7 (ref 0.9–1.1)
PROTHROMBIN TIME (PT) IN PPP BY COAGULATION ASSAY: 42 SEC (ref 9.8–12.8)
PROTHROMBIN TIME: 42 SEC (ref 9.8–12.8)

## 2023-10-01 PROBLEM — L91.8 OTHER HYPERTROPHIC DISORDERS OF THE SKIN: Status: ACTIVE | Noted: 2023-06-20

## 2023-10-01 PROBLEM — B07.9 VIRAL WART, UNSPECIFIED: Status: ACTIVE | Noted: 2023-06-20

## 2023-10-01 PROBLEM — C44.619: Status: ACTIVE | Noted: 2023-10-01

## 2023-10-01 PROBLEM — E66.811 CLASS 1 OBESITY WITH BODY MASS INDEX (BMI) OF 32.0 TO 32.9 IN ADULT: Status: ACTIVE | Noted: 2023-10-01

## 2023-10-01 PROBLEM — R60.9 EDEMA: Status: ACTIVE | Noted: 2023-10-01

## 2023-10-01 PROBLEM — H93.19 TINNITUS: Status: ACTIVE | Noted: 2023-10-01

## 2023-10-01 PROBLEM — H31.001 RETINAL SCAR OF RIGHT EYE: Status: ACTIVE | Noted: 2023-10-01

## 2023-10-01 PROBLEM — R09.81 CHRONIC NASAL CONGESTION: Status: ACTIVE | Noted: 2023-10-01

## 2023-10-01 PROBLEM — E78.5 HYPERLIPIDEMIA: Status: ACTIVE | Noted: 2023-10-01

## 2023-10-01 PROBLEM — N44.2 CYST OF TESTIS: Status: ACTIVE | Noted: 2023-10-01

## 2023-10-01 PROBLEM — R06.02 SHORTNESS OF BREATH ON EXERTION: Status: ACTIVE | Noted: 2023-10-01

## 2023-10-01 PROBLEM — H25.813 COMBINED FORM OF AGE-RELATED CATARACT, BOTH EYES: Status: ACTIVE | Noted: 2023-10-01

## 2023-10-01 PROBLEM — D22.60 MELANOCYTIC NEVI OF UNSPECIFIED UPPER LIMB, INCLUDING SHOULDER: Status: ACTIVE | Noted: 2023-06-20

## 2023-10-01 PROBLEM — D18.01 HEMANGIOMA OF SKIN AND SUBCUTANEOUS TISSUE: Status: ACTIVE | Noted: 2023-06-20

## 2023-10-01 PROBLEM — I10 ESSENTIAL HYPERTENSION: Status: ACTIVE | Noted: 2018-02-27

## 2023-10-01 PROBLEM — M19.90 ARTHRITIS: Status: ACTIVE | Noted: 2023-10-01

## 2023-10-01 PROBLEM — H81.10 BPPV (BENIGN PAROXYSMAL POSITIONAL VERTIGO): Status: ACTIVE | Noted: 2023-10-01

## 2023-10-01 PROBLEM — Z79.899 HIGH RISK MEDICATION USE: Status: ACTIVE | Noted: 2023-10-01

## 2023-10-01 PROBLEM — D22.70 MELANOCYTIC NEVI OF UNSPECIFIED LOWER LIMB, INCLUDING HIP: Status: ACTIVE | Noted: 2023-06-20

## 2023-10-01 PROBLEM — I48.0 PAROXYSMAL ATRIAL FIBRILLATION (MULTI): Status: ACTIVE | Noted: 2023-10-01

## 2023-10-01 PROBLEM — M48.062 LUMBAR STENOSIS WITH NEUROGENIC CLAUDICATION: Status: ACTIVE | Noted: 2020-03-17

## 2023-10-01 PROBLEM — Z79.01 ANTICOAGULANT LONG-TERM USE: Status: ACTIVE | Noted: 2023-10-01

## 2023-10-01 PROBLEM — R53.83 FATIGUE: Status: ACTIVE | Noted: 2023-10-01

## 2023-10-01 PROBLEM — H81.90 VESTIBULOPATHY: Status: ACTIVE | Noted: 2023-10-01

## 2023-10-01 PROBLEM — E27.8 ADRENAL NODULE (MULTI): Status: ACTIVE | Noted: 2023-10-01

## 2023-10-01 PROBLEM — I50.30: Status: ACTIVE | Noted: 2023-10-01

## 2023-10-01 PROBLEM — I48.92 PAROXYSMAL ATRIAL FLUTTER (MULTI): Status: ACTIVE | Noted: 2023-10-01

## 2023-10-01 PROBLEM — D22.4 MELANOCYTIC NEVI OF SCALP AND NECK: Status: ACTIVE | Noted: 2023-06-20

## 2023-10-01 PROBLEM — M79.10 MYALGIA, UNSPECIFIED SITE: Status: ACTIVE | Noted: 2019-09-03

## 2023-10-01 PROBLEM — E66.9 CLASS 1 OBESITY WITH BODY MASS INDEX (BMI) OF 32.0 TO 32.9 IN ADULT: Status: ACTIVE | Noted: 2023-10-01

## 2023-10-01 PROBLEM — H52.00 HYPEROPIA WITH PRESBYOPIA: Status: ACTIVE | Noted: 2023-10-01

## 2023-10-01 PROBLEM — E11.9 DIABETES MELLITUS TREATED WITH ORAL MEDICATION (MULTI): Status: ACTIVE | Noted: 2023-10-01

## 2023-10-01 PROBLEM — M48.02 DEGENERATIVE CERVICAL SPINAL STENOSIS: Status: ACTIVE | Noted: 2023-10-01

## 2023-10-01 PROBLEM — R13.10 DYSPHAGIA: Status: ACTIVE | Noted: 2023-10-01

## 2023-10-01 PROBLEM — L81.7 PIGMENTED PURPURIC DERMATOSIS: Status: ACTIVE | Noted: 2023-06-20

## 2023-10-01 PROBLEM — J34.3 HYPERTROPHY OF INFERIOR NASAL TURBINATE: Status: ACTIVE | Noted: 2023-10-01

## 2023-10-01 PROBLEM — I05.9 MITRAL VALVE DISEASE: Status: ACTIVE | Noted: 2018-02-27

## 2023-10-01 PROBLEM — H02.889 MGD (MEIBOMIAN GLAND DISEASE): Status: ACTIVE | Noted: 2023-10-01

## 2023-10-01 PROBLEM — D22.5 MELANOCYTIC NEVI OF TRUNK: Status: ACTIVE | Noted: 2023-06-20

## 2023-10-01 PROBLEM — I49.5 SICK SINUS SYNDROME (MULTI): Status: ACTIVE | Noted: 2023-10-01

## 2023-10-01 PROBLEM — H90.3 BILATERAL SENSORINEURAL HEARING LOSS: Status: ACTIVE | Noted: 2023-10-01

## 2023-10-01 PROBLEM — M65.4 RADIAL STYLOID TENOSYNOVITIS: Status: ACTIVE | Noted: 2019-06-13

## 2023-10-01 PROBLEM — R09.02 HYPOXIA: Status: ACTIVE | Noted: 2023-10-01

## 2023-10-01 PROBLEM — H52.4 HYPEROPIA WITH PRESBYOPIA: Status: ACTIVE | Noted: 2023-10-01

## 2023-10-01 PROBLEM — Z79.84 DIABETES MELLITUS TREATED WITH ORAL MEDICATION (MULTI): Status: ACTIVE | Noted: 2023-10-01

## 2023-10-01 PROBLEM — N45.3 ORCHITIS AND EPIDIDYMITIS: Status: ACTIVE | Noted: 2023-10-01

## 2023-10-01 PROBLEM — E53.8 VITAMIN B 12 DEFICIENCY: Status: ACTIVE | Noted: 2023-10-01

## 2023-10-01 PROBLEM — Z85.828 PERSONAL HISTORY OF OTHER MALIGNANT NEOPLASM OF SKIN: Status: ACTIVE | Noted: 2023-06-20

## 2023-10-01 PROBLEM — F32.89 ATYPICAL DEPRESSIVE DISORDER: Status: ACTIVE | Noted: 2023-10-01

## 2023-10-01 PROBLEM — Z87.898 HISTORY OF SNORING: Status: ACTIVE | Noted: 2023-10-01

## 2023-10-01 PROBLEM — D22.30 MELANOCYTIC NEVI OF UNSPECIFIED PART OF FACE: Status: ACTIVE | Noted: 2023-06-20

## 2023-10-01 PROBLEM — G43.109 OCULAR MIGRAINE: Status: ACTIVE | Noted: 2023-10-01

## 2023-10-01 PROBLEM — N28.89 RENAL MASS, LEFT: Status: ACTIVE | Noted: 2023-10-01

## 2023-10-01 PROBLEM — Z95.2 HISTORY OF MITRAL VALVE REPLACEMENT: Status: ACTIVE | Noted: 2018-01-24

## 2023-10-01 PROBLEM — L57.0 ACTINIC KERATOSIS: Status: ACTIVE | Noted: 2023-06-20

## 2023-10-01 PROBLEM — E27.9 ADRENAL NODULE: Status: ACTIVE | Noted: 2023-10-01

## 2023-10-01 PROBLEM — D48.5 NEOPLASM OF UNCERTAIN BEHAVIOR OF SKIN: Status: ACTIVE | Noted: 2023-06-20

## 2023-10-01 PROBLEM — L81.4 OTHER MELANIN HYPERPIGMENTATION: Status: ACTIVE | Noted: 2023-06-20

## 2023-10-01 PROBLEM — N43.3 HYDROCELE, BILATERAL: Status: ACTIVE | Noted: 2023-10-01

## 2023-10-01 PROBLEM — L82.1 OTHER SEBORRHEIC KERATOSIS: Status: ACTIVE | Noted: 2023-06-20

## 2023-10-01 PROBLEM — H04.123 BILATERAL DRY EYES: Status: ACTIVE | Noted: 2023-10-01

## 2023-10-01 PROBLEM — E55.9 VITAMIN D DEFICIENCY: Status: ACTIVE | Noted: 2023-10-01

## 2023-10-01 RX ORDER — MECHLORETHAMINE HYDROCHLORIDE 0.01 G/60G
GEL TOPICAL
COMMUNITY
Start: 2015-09-23 | End: 2023-10-02

## 2023-10-01 RX ORDER — MECLIZINE HYDROCHLORIDE 25 MG/1
TABLET ORAL
COMMUNITY
End: 2023-10-02

## 2023-10-01 RX ORDER — CLOBETASOL PROPIONATE 0.5 MG/G
CREAM TOPICAL
COMMUNITY
Start: 2016-11-15 | End: 2023-10-02

## 2023-10-01 RX ORDER — AMLODIPINE BESYLATE 5 MG/1
5 TABLET ORAL DAILY
COMMUNITY
End: 2023-10-02

## 2023-10-01 RX ORDER — ALBUTEROL SULFATE 90 UG/1
1 AEROSOL, METERED RESPIRATORY (INHALATION) EVERY 4 HOURS PRN
COMMUNITY
End: 2023-10-02

## 2023-10-01 RX ORDER — KETOCONAZOLE 20 MG/G
CREAM TOPICAL
COMMUNITY
Start: 2016-11-15 | End: 2023-10-02

## 2023-10-01 RX ORDER — SULFAMETHOXAZOLE AND TRIMETHOPRIM 400; 80 MG/1; MG/1
TABLET ORAL
COMMUNITY
Start: 2018-01-18 | End: 2023-10-02

## 2023-10-01 RX ORDER — FLECAINIDE ACETATE 100 MG/1
100 TABLET ORAL 2 TIMES DAILY
COMMUNITY
End: 2023-10-02

## 2023-10-01 RX ORDER — CANDESARTAN CILEXETIL AND HYDROCHLOROTHIAZIDE 32; 12.5 MG/1; MG/1
TABLET ORAL
COMMUNITY
Start: 2015-12-29 | End: 2023-10-02

## 2023-10-01 RX ORDER — CHOLECALCIFEROL (VITAMIN D3) 25 MCG
1 TABLET ORAL DAILY
COMMUNITY
End: 2023-10-02

## 2023-10-01 RX ORDER — DESONIDE 0.5 MG/G
CREAM TOPICAL
COMMUNITY
Start: 2016-02-16 | End: 2023-10-02

## 2023-10-01 RX ORDER — HYDROCORTISONE 25 MG/G
CREAM TOPICAL
COMMUNITY
Start: 2020-10-13 | End: 2023-10-02

## 2023-10-01 RX ORDER — ROSUVASTATIN CALCIUM 20 MG/1
TABLET, COATED ORAL
COMMUNITY
Start: 2015-12-29 | End: 2023-10-02

## 2023-10-01 RX ORDER — TRIAMCINOLONE ACETONIDE 1 MG/G
CREAM TOPICAL
COMMUNITY
Start: 2020-10-13 | End: 2023-10-02

## 2023-10-01 RX ORDER — CEPHALEXIN 500 MG/1
CAPSULE ORAL
COMMUNITY
Start: 2017-12-26 | End: 2023-10-02

## 2023-10-01 RX ORDER — VALSARTAN 160 MG/1
2 TABLET ORAL DAILY
COMMUNITY
End: 2023-10-02

## 2023-10-01 RX ORDER — MUPIROCIN 20 MG/G
OINTMENT TOPICAL
COMMUNITY
Start: 2021-04-21 | End: 2023-10-02

## 2023-10-01 RX ORDER — MELATONIN 5 MG
1 CAPSULE ORAL NIGHTLY PRN
COMMUNITY

## 2023-10-02 ENCOUNTER — OFFICE VISIT (OUTPATIENT)
Dept: PULMONOLOGY | Facility: CLINIC | Age: 78
End: 2023-10-02
Payer: MEDICARE

## 2023-10-02 ENCOUNTER — TELEPHONE (OUTPATIENT)
Dept: SLEEP MEDICINE | Facility: CLINIC | Age: 78
End: 2023-10-02

## 2023-10-02 VITALS
HEART RATE: 87 BPM | TEMPERATURE: 98.8 F | BODY MASS INDEX: 31.75 KG/M2 | RESPIRATION RATE: 16 BRPM | SYSTOLIC BLOOD PRESSURE: 128 MMHG | OXYGEN SATURATION: 93 % | DIASTOLIC BLOOD PRESSURE: 70 MMHG | WEIGHT: 215 LBS

## 2023-10-02 DIAGNOSIS — R06.89 DYSPNEA AND RESPIRATORY ABNORMALITIES: Primary | ICD-10-CM

## 2023-10-02 DIAGNOSIS — G47.33 OSA ON CPAP: ICD-10-CM

## 2023-10-02 DIAGNOSIS — R06.00 DYSPNEA AND RESPIRATORY ABNORMALITIES: Primary | ICD-10-CM

## 2023-10-02 PROCEDURE — 1036F TOBACCO NON-USER: CPT | Performed by: NURSE PRACTITIONER

## 2023-10-02 PROCEDURE — 3074F SYST BP LT 130 MM HG: CPT | Performed by: NURSE PRACTITIONER

## 2023-10-02 PROCEDURE — 1160F RVW MEDS BY RX/DR IN RCRD: CPT | Performed by: NURSE PRACTITIONER

## 2023-10-02 PROCEDURE — 1159F MED LIST DOCD IN RCRD: CPT | Performed by: NURSE PRACTITIONER

## 2023-10-02 PROCEDURE — 99204 OFFICE O/P NEW MOD 45 MIN: CPT | Performed by: NURSE PRACTITIONER

## 2023-10-02 PROCEDURE — 3078F DIAST BP <80 MM HG: CPT | Performed by: NURSE PRACTITIONER

## 2023-10-02 ASSESSMENT — ENCOUNTER SYMPTOMS
EYE ITCHING: 1
DIARRHEA: 1
BRUISES/BLEEDS EASILY: 1
DIZZINESS: 1

## 2023-10-02 NOTE — TELEPHONE ENCOUNTER
LMOM for pt to call Pulmonary office.    Pt is scheduled on 11/9/23 with Dr.Cailey Kennedy.     will be on call 11-6 through 11-10.    Please assist in rescheduling appointment.      used

## 2023-10-02 NOTE — PATIENT INSTRUCTIONS
Thank you for visiting the Pulmonary clinic today!     Tests: pulmonary function test with MIP and MEP and FENO       Return to clinic after  6-8 weeks and after PFTs, complete  or sooner if needed   Ashlie Veronica CNP  My office number is [phone] -     Best way to get a hold of me is to call my office --> Please do not send me follow my health messages  Any test results will be discussed at next visit -- please make sure to make a follow up appt after testing.

## 2023-10-02 NOTE — PROGRESS NOTES
Mando Tay      [id number]    10/2/2023       No chief complaint on file.   DYSPNEA      HPI:     Referred by Dr. Namrata Alvarado   PCP/ Juan West MD     Mr. Tay is a 78 y.o man, nonsmoker, being evaluated for dyspnea .     HPI:    He was recently sick in August 14th had head cold to bronchitis, had 2 course of antibiotics and OTC anti-tussives, was on azithromycin and clindamycin. Reported productive cough with yellow phlegm, he was short of breath and remains SOB.  Denies fever or chills at that time. Had COVID on 9/8/2023. He is feeling better besides shortness of breath.    Started to have dyspnea in late 2021, he was diagnosed at that time with afib, was medication controlled. In Jan 2022 had cardioversion. After that developed pneumonia and was hospitalized. He notes having pleural effusions.  He still remains dyspneic. He becomes dyspneic walking to end to driveway to get trash cans. His driveway is about 70-80 feet long. Can tolerate 2-3 flights of stairs.  He currently sits for most of the day, works inside the house, but does not carry loads and do strenuous exercise.   He has RAMSES, wears CPAP wears all night 6-8 hours.  Was seeing Dr. MORALES from [facility].     He also denies orthopnea, pnd, or stuart.  His weight has been mostly stable.  He also denies chronic cough, wheezing, and clear, green, blood streaks, but no sputum.  No night cough. No hemoptysis. No fever or shivering chills. He has a runny nose, but denies tingling sensation in the back of his throat. He denies chest pain or heartburn.     Previous pulmonary history:   Bronchitis in the past     Inhalers/nebulized medications: stopped proventil     Hospitalization History:  He has not been hospitalized over the last year for breathing related problem.      Sleep history:  Has RAMSES with CPAP    Comorbidities:  Afib  HTN  Depression  T cell lymphoma    SH:  smoking:nonsmoker  drinking: none  illicit drug use: none     Occupation: (Full  questionnaire on exposures obtained, discussed with the patient and scanned to EMR)  Previously worked as   No known exposure to asbestos, silica or beryllium  Pets: 2 large dogs       Family History:  Both parents smoked  Mom had COPD  Dad 88 y/o cardiac issues, stomach cancer       PFT 11/2021: Spirometry   FVC            2.24 L   53%  Post bronchodilator 2.18 L  52%   Change -2 %   FEV1          1.79 L  59%   Post bronchodilator  1.85 L  61%     Change 3%   FEV1/FVC  80  %             Post bronchodilator  84 %   FBP37-34% 1.68 L  77%  Post bronchodilator  2.13 L  98%   Change   26%     Lung volume   SVC           2.10 L  50%   RV              2.59 L 100%   TLC            5.69  L 66%   RV/TLC      55 %     DLCO           81 %     Test interpretation     Spirometry suggest restriction, with no significant response to   bronchodilator   Lung volumes confirms moderately severe restrictive lung disease   Patient also has increased RV to TLC ratio indicating air   trapping and 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   Diffusion capacity is normal indicating restriction is due to   extra pulmonary etiology.     ROS: Review of Systems   HENT:  Positive for postnasal drip.    Eyes:  Positive for itching.        Dryness and change in vision   Gastrointestinal:  Positive for diarrhea.   Skin: Negative.    Neurological:  Positive for dizziness.   Hematological:  Bruises/bleeds easily.   All other systems reviewed and are negative.   All other ROS reviewed and negative for complaints    Allergies   Allergen Reactions    Atorvastatin Myalgia    Flecainide Unknown    Rosuvastatin Myalgia    Statins-Hmg-Coa Reductase Inhibitors Unknown     Statins do not work    Sulfa (Sulfonamide Antibiotics) Unknown    Procainamide Rash        Current Outpatient Medications on File Prior to Visit   Medication Sig Dispense Refill    amLODIPine (Norvasc) 10 mg tablet 1 tablet (10 mg).      carvedilol (Coreg) 25 mg  tablet Take 1 tablet (25 mg) by mouth 2 times a day.      cyanocobalamin (Vitamin B-12) 1,000 mcg tablet Take 100 mcg by mouth once daily.      eucerin (CeraVe) cream Apply topically. Apply sparingly to affected area(s) 3 times a day.      ezetimibe (Zetia) 10 mg tablet Take 1 tablet (10 mg) by mouth once daily at bedtime.      fluticasone (Flonase) 50 mcg/actuation nasal spray Administer 1 spray into each nostril once daily.      melatonin 5 mg capsule Take 1 capsule by mouth as needed at bedtime.      metFORMIN (Glucophage) 500 mg tablet Take 1 tablet (500 mg) by mouth 2 times a day with meals.      pravastatin (Pravachol) 20 mg tablet Take 1 tablet (20 mg) by mouth once daily.      sertraline (Zoloft) 100 mg tablet Take 1 tablet (100 mg) by mouth once daily.      tamsulosin (Flomax) 0.4 mg 24 hr capsule Take 1 capsule (0.4 mg) by mouth once daily.      [DISCONTINUED] albuterol (Proventil HFA) 90 mcg/actuation inhaler Inhale 2 puffs every 4 hours if needed for wheezing or shortness of breath. 6.7 g 0    [DISCONTINUED] albuterol 90 mcg/actuation inhaler Inhale 1 puff every 4 hours if needed.      [DISCONTINUED] amLODIPine (Norvasc) 5 mg tablet Take 1 tablet (5 mg) by mouth once daily.      [DISCONTINUED] candesartan-hydrochlorothiazid (Atacand HCT) 32-12.5 mg tablet       [DISCONTINUED] cephalexin (Keflex) 500 mg capsule 1 Capsule      [DISCONTINUED] cholecalciferol (Vitamin D-3) 25 MCG (1000 UT) tablet Take 1 tablet (25 mcg) by mouth once daily.      [DISCONTINUED] cholecalciferol (Vitamin D-3) 50 mcg (2,000 unit) capsule Take 1 capsule (50 mcg) by mouth once daily.      [DISCONTINUED] clobetasol (Temovate) 0.05 % cream 1 Application      [DISCONTINUED] desonide (DesOwen) 0.05 % cream 1 Application      [DISCONTINUED] flecainide (Tambocor) 100 mg tablet Take 1 tablet (100 mg) by mouth 2 times a day.      [DISCONTINUED] furosemide (Lasix) 20 mg tablet Take 1 tablet (20 mg) by mouth once daily.      [DISCONTINUED]  hydrocortisone 2.5 % cream 1 Applicator      [DISCONTINUED] ketoconazole (NIZOral) 2 % cream 1 Application      [DISCONTINUED] mechlorethamine (Valchlor) 0.016 % gel gel 1 Application      [DISCONTINUED] meclizine (Antivert) 25 mg tablet       [DISCONTINUED] melatonin 5 mg tablet Take by mouth.      [DISCONTINUED] mupirocin (Bactroban) 2 % ointment 1 Application      [DISCONTINUED] rosuvastatin (Crestor) 20 mg tablet       [DISCONTINUED] sulfamethoxazole-trimethoprim (Bactrim) 400-80 mg tablet 1 Tablet      [DISCONTINUED] triamcinolone (Kenalog) 0.1 % cream 1 Application      [DISCONTINUED] valsartan (Diovan) 160 mg tablet Take 1 tablet (160 mg) by mouth once daily.      [DISCONTINUED] valsartan (Diovan) 160 mg tablet Take 2 tablets (320 mg) by mouth once daily.      [DISCONTINUED] warfarin (Coumadin) 5 mg tablet Take 1-2 tablets (5-10 mg) by mouth once daily. Or as directed by [facility]       No current facility-administered medications on file prior to visit.        PMHx:  Past Medical History:   Diagnosis Date    Acute myocardial infarction, unspecified (CMS/[facility]) 11/17/2021    Acute myocardial infarction    Epididymitis 06/02/2022    Epididymitis, right    Essential (primary) hypertension 12/08/2022    Hypertension    Obesity, unspecified 04/04/2022    Class 1 obesity with body mass index (BMI) of 31.0 to 31.9 in adult    Obesity, unspecified 06/08/2022    Class 1 obesity with body mass index (BMI) of 30.0 to 30.9 in adult    Obesity, unspecified 01/10/2022    Class 1 obesity with body mass index (BMI) of 31.0 to 31.9 in adult    Obesity, unspecified 01/31/2022    Class 1 obesity with body mass index (BMI) of 31.0 to 31.9 in adult    Other abnormal findings in urine 05/09/2022    Dark urine    Other specified disorders of kidney and ureter     Bilateral renal masses    Other specified disorders of kidney and ureter 05/09/2022    Renal mass, right    Other specified disorders of the male genital organs 05/09/2022     Swelling of right half of scrotum    Other symptoms and signs involving the musculoskeletal system 11/17/2021    Leg fatigue    Personal history of other diseases of the circulatory system 11/17/2021    History of mitral valve insufficiency    Personal history of other diseases of the respiratory system 12/09/2021    History of acute bronchitis    Personal history of other specified conditions 04/04/2022    History of shortness of breath    Personal history of other specified conditions     History of snoring    Personal history of pneumonia (recurrent) 01/31/2022    History of pneumonia    Prediabetes 06/18/2018    Borderline diabetes mellitus    Scrotal pain 05/09/2022    Acute pain in scrotum    Shortness of breath 12/09/2022    Shortness of breath on exertion    Unspecified cataract 02/22/2017    Cataract of right eye    Unspecified cataract 02/22/2017    Cataract of left eye    Unspecified osteoarthritis, unspecified site     Arthritis        Social Hx:  Social History     Socioeconomic History    Marital status:      Spouse name: Not on file    Number of children: Not on file    Years of education: Not on file    Highest education level: Not on file   Occupational History    Not on file   Tobacco Use    Smoking status: Never    Smokeless tobacco: Never   Substance and Sexual Activity    Alcohol use: Never    Drug use: Never    Sexual activity: Not on file   Other Topics Concern    Not on file   Social History Narrative    Not on file     Social Determinants of Health     Financial Resource Strain: Not on file   Food Insecurity: Not on file   Transportation Needs: Not on file   Physical Activity: Not on file   Stress: Not on file   Social Connections: Not on file   Intimate Partner Violence: Not on file   Housing Stability: Not on file        Fhx:  Family History   Problem Relation Name Age of Onset    Other (Cardiac disorder) Mother      Cataracts Mother      Other (Cardiac disorder) Father      Stomach  cancer Father          Surgical Hx:  Past Surgical History:   Procedure Laterality Date    CORONARY ARTERY BYPASS GRAFT  02/22/2017    Coronary Artery Surgery    HERNIA REPAIR  02/22/2017    Inguinal Hernia Repair    OTHER SURGICAL HISTORY  11/17/2021    Surgery    OTHER SURGICAL HISTORY  11/17/2021    Cardioversion    OTHER SURGICAL HISTORY  12/20/2021    Mitral valve replacement    OTHER SURGICAL HISTORY  12/20/2021    Cardiac catheterization          /70 (BP Location: Left arm, Patient Position: Sitting, BP Cuff Size: Adult)   Pulse 87   Temp 37.1 °C (98.8 °F) (Temporal)   Resp 16   Wt 97.5 kg (215 lb)   SpO2 93%   BMI 31.75 kg/m²      Physical Exam  Constitutional:       Appearance: Normal appearance.   HENT:      Head: Normocephalic and atraumatic.      Nose: Nose normal.      Mouth/Throat:      Mouth: Mucous membranes are moist.      Pharynx: Oropharynx is clear.   Eyes:      Extraocular Movements: Extraocular movements intact.      Pupils: Pupils are equal, round, and reactive to light.   Cardiovascular:      Rate and Rhythm: Normal rate and regular rhythm.      Pulses: Normal pulses.   Pulmonary:      Effort: Pulmonary effort is normal.      Breath sounds: Normal breath sounds.   Abdominal:      General: Bowel sounds are normal.      Palpations: Abdomen is soft.   Musculoskeletal:         General: Normal range of motion.   Skin:     General: Skin is warm and dry.   Neurological:      General: No focal deficit present.      Mental Status: He is alert and oriented to person, place, and time. Mental status is at baseline.   Psychiatric:         Mood and Affect: Mood normal.         Behavior: Behavior normal.         Thought Content: Thought content normal.         Judgment: Judgment normal.        Results:      CXR IMPRESSION: 9/14/2023   1.  Elevated right hemidiaphragm similar to prior. Adjacent right  basilar streaky opacity may represent atelectasis/scarring although small infiltrate is not  fully excluded.      A/P:  Mr. Tay is a 78 y.o man, nonsmoker, being evaluated for dyspnea .   Patient experienced dyspnea before Afib, now that is controlled continues to have dyspnea. PFTS reviewed from 2021 revealing restrictive disease.     Sept CXR with right hemidiaphraghm  - will check MIP and mep  Will obtain PFTs with MIP/MEP with FENO, 6 minute walk test  Will re-evaluate patient in 6-8 weeks

## 2023-10-02 NOTE — TELEPHONE ENCOUNTER
Spoke with pt.    Scheduled with  for 11/30 at 11:30 am.    Pt is going to see Ashlie today.    If patient does not need to see  the appointment may be cancelled.

## 2023-10-05 ENCOUNTER — APPOINTMENT (OUTPATIENT)
Dept: PULMONOLOGY | Facility: CLINIC | Age: 78
End: 2023-10-05
Payer: MEDICARE

## 2023-10-09 ENCOUNTER — HOSPITAL ENCOUNTER (OUTPATIENT)
Dept: RESPIRATORY THERAPY | Facility: HOSPITAL | Age: 78
Discharge: HOME | End: 2023-10-09
Payer: MEDICARE

## 2023-10-09 ENCOUNTER — TELEPHONE (OUTPATIENT)
Dept: PULMONOLOGY | Facility: CLINIC | Age: 78
End: 2023-10-09

## 2023-10-09 DIAGNOSIS — R06.00 DYSPNEA AND RESPIRATORY ABNORMALITIES: ICD-10-CM

## 2023-10-09 DIAGNOSIS — R06.89 DYSPNEA AND RESPIRATORY ABNORMALITIES: ICD-10-CM

## 2023-10-09 PROCEDURE — 94010 BREATHING CAPACITY TEST: CPT

## 2023-10-09 PROCEDURE — 94729 DIFFUSING CAPACITY: CPT | Performed by: INTERNAL MEDICINE

## 2023-10-09 PROCEDURE — 94010 BREATHING CAPACITY TEST: CPT | Performed by: INTERNAL MEDICINE

## 2023-10-09 PROCEDURE — 94726 PLETHYSMOGRAPHY LUNG VOLUMES: CPT | Performed by: INTERNAL MEDICINE

## 2023-10-13 NOTE — PROGRESS NOTES
Patient: Mando Tay    [id number]  : 1945 -- AGE 78 y.o.    Provider: Ashlie Veronica CNP      Location [facility]   Service Date: 10/17/2023       Department of Medicine  Division of Pulmonary, Critical Care, and Sleep Medicine         [facility]  Follow Up Visit Note        HISTORY OF PRESENT ILLNESS     HISTORY OF PRESENT ILLNESS   Mando Tay is a 78 y.o. male who presents to a [facility] for a follow up visit with concerns of No chief complaint on file.. I have independently interviewed and examined the patient in the office and reviewed available records.    DATE OF LAST VISIT: 10/2/23 - Ashlie Veronica CNP      Referred by Dr. Namrata Alvarado   PCP/ Juan West MD      Mr. Tay is a 78 y.o man, nonsmoker, being evaluated for dyspnea .      HPI:     He was recently sick in  had head cold to bronchitis, had 2 course of antibiotics and OTC anti-tussives, was on azithromycin and clindamycin. Reported productive cough with yellow phlegm, he was short of breath and remains SOB.  Denies fever or chills at that time. Had COVID on 2023. He is feeling better besides shortness of breath.     Started to have dyspnea in late , he was diagnosed at that time with afib, was medication controlled. In 2022 had cardioversion. After that developed pneumonia and was hospitalized. He notes having pleural effusions.  He still remains dyspneic. He becomes dyspneic walking to end to driveway to get trash cans. His driveway is about 70-80 feet long. Can tolerate 2-3 flights of stairs.  He currently sits for most of the day, works inside the house, but does not carry loads and do strenuous exercise.   He has RAMSES, wears CPAP wears all night 6-8 hours.  Was seeing Dr. ANDREW Acharya.      He also denies orthopnea, pnd, or stuart.  His weight has been mostly stable.  He also denies chronic cough, wheezing, and  clear, green, blood streaks, but no sputum.  No night cough. No hemoptysis. No fever or shivering chills. He has a runny nose, but denies tingling sensation in the back of his throat. He denies chest pain or heartburn.      Previous pulmonary history:   Bronchitis in the past      Inhalers/nebulized medications: stopped proventil      Hospitalization History:  He has not been hospitalized over the last year for breathing related problem.        Sleep history:  Has RAMSES with CPAP     Comorbidities:  Afib  HTN  Depression  T cell lymphoma     SH:  smoking:nonsmoker  drinking: none  illicit drug use: none     Occupation: (Full questionnaire on exposures obtained, discussed with the patient and scanned to EMR)  Previously worked as   No known exposure to asbestos, silica or beryllium  Pets: 2 large dogs        Family History:  Both parents smoked  Mom had COPD  Dad 88 y/o cardiac issues, stomach cancer         PFT 11/2021: Spirometry   FVC            2.24 L   53%  Post bronchodilator 2.18 L  52%   Change -2 %   FEV1          1.79 L  59%   Post bronchodilator  1.85 L  61%     Change 3%   FEV1/FVC  80  %             Post bronchodilator  84 %   IIM30-17% 1.68 L  77%  Post bronchodilator  2.13 L  98%   Change   26%     Lung volume   SVC           2.10 L  50%   RV              2.59 L 100%   TLC            5.69  L 66%   RV/TLC      55 %     DLCO           81 %     Test interpretation     Spirometry suggest restriction, with no significant response to   bronchodilator   Lung volumes confirms moderately severe restrictive lung disease   Patient also has increased RV to TLC ratio indicating air   trapping and 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   Diffusion capacity is normal indicating restriction is due to   extra pulmonary etiology.      ROS: Review of Systems   HENT:  Positive for postnasal drip.    Eyes:  Positive for itching.        Dryness and change in vision   Gastrointestinal:  Positive for  diarrhea.   Skin: Negative.    Neurological:  Positive for dizziness.   Hematological:  Bruises/bleeds easily.   All other systems reviewed and are negative.   All other ROS reviewed and negative for complaints    Current History    On today's visit, the patient reports has been ok. No ER visits or urgent care visits. Reports he is not sleeping well. He does his own lawn work.  Denies cough, wheeze, Fevers/chills, MACHADO. SOB at rest, chest pain, or allergies symtpoms or GERD. He feels MACHADO          REVIEW OF SYSTEMS     REVIEW OF SYSTEMS  Review of Systems    Review of Systems:-All 10 systems verified and are negative other than pertinent positive which are above       ALLERGIES AND MEDICATIONS     ALLERGIES  Allergies   Allergen Reactions    Atorvastatin Myalgia    Flecainide Unknown    Rosuvastatin Myalgia    Statins-Hmg-Coa Reductase Inhibitors Unknown     Statins do not work    Sulfa (Sulfonamide Antibiotics) Unknown    Procainamide Rash       MEDICATIONS  Current Outpatient Medications   Medication Sig Dispense Refill    amLODIPine (Norvasc) 10 mg tablet 1 tablet (10 mg).      carvedilol (Coreg) 25 mg tablet Take 1 tablet (25 mg) by mouth 2 times a day.      cyanocobalamin (Vitamin B-12) 1,000 mcg tablet Take 100 mcg by mouth once daily.      eucerin (CeraVe) cream Apply topically. Apply sparingly to affected area(s) 3 times a day.      ezetimibe (Zetia) 10 mg tablet Take 1 tablet (10 mg) by mouth once daily at bedtime.      fluticasone (Flonase) 50 mcg/actuation nasal spray Administer 1 spray into each nostril once daily.      melatonin 5 mg capsule Take 1 capsule by mouth as needed at bedtime.      metFORMIN (Glucophage) 500 mg tablet Take 1 tablet (500 mg) by mouth 2 times a day with meals.      pravastatin (Pravachol) 20 mg tablet Take 1 tablet (20 mg) by mouth once daily.      sertraline (Zoloft) 100 mg tablet Take 1 tablet (100 mg) by mouth once daily.      tamsulosin (Flomax) 0.4 mg 24 hr capsule Take 1  capsule (0.4 mg) by mouth once daily.       No current facility-administered medications for this visit.         PAST HISTORY     PAST MEDICAL HISTORY  He  has a past medical history of Acute myocardial infarction, unspecified (CMS/HCC) (11/17/2021), Epididymitis (06/02/2022), Essential (primary) hypertension (12/08/2022), Obesity, unspecified (04/04/2022), Obesity, unspecified (06/08/2022), Obesity, unspecified (01/10/2022), Obesity, unspecified (01/31/2022), Other abnormal findings in urine (05/09/2022), Other specified disorders of kidney and ureter, Other specified disorders of kidney and ureter (05/09/2022), Other specified disorders of the male genital organs (05/09/2022), Other symptoms and signs involving the musculoskeletal system (11/17/2021), Personal history of other diseases of the circulatory system (11/17/2021), Personal history of other diseases of the respiratory system (12/09/2021), Personal history of other specified conditions (04/04/2022), Personal history of other specified conditions, Personal history of pneumonia (recurrent) (01/31/2022), Prediabetes (06/18/2018), Scrotal pain (05/09/2022), Shortness of breath (12/09/2022), Unspecified cataract (02/22/2017), Unspecified cataract (02/22/2017), and Unspecified osteoarthritis, unspecified site.       PAST SURGICAL HISTORY  Past Surgical History:   Procedure Laterality Date    CORONARY ARTERY BYPASS GRAFT  02/22/2017    Coronary Artery Surgery    HERNIA REPAIR  02/22/2017    Inguinal Hernia Repair    OTHER SURGICAL HISTORY  11/17/2021    Surgery    OTHER SURGICAL HISTORY  11/17/2021    Cardioversion    OTHER SURGICAL HISTORY  12/20/2021    Mitral valve replacement    OTHER SURGICAL HISTORY  12/20/2021    Cardiac catheterization       IMMUNIZATION HISTORY  Immunization History   Administered Date(s) Administered    Influenza, High Dose Seasonal, Preservative Free 11/30/2017, 10/16/2018, 10/17/2019    Influenza, Unspecified 10/01/2010, 01/01/2014,  "02/01/2015, 10/01/2018, 11/01/2022    Influenza, live, intranasal, quadrivalent 11/22/2021    Influenza, seasonal, injectable 01/15/2014, 11/28/2018, 08/01/2019, 10/01/2020, 09/13/2021, 09/19/2022    Pfizer Gray Cap SARS-CoV-2 05/05/2022    Pfizer Purple Cap SARS-CoV-2 02/10/2021, 03/02/2021, 10/13/2021, 02/07/2022    Pneumococcal conjugate vaccine, 13-valent (PREVNAR 13) 03/03/2017    Pneumococcal polysaccharide vaccine, 23-valent, age 2 years and older (PNEUMOVAX 23) 02/10/2014, 07/30/2019, 08/16/2021    Zoster, Unspecified 07/01/2019, 08/31/2019       SOCIAL HISTORY  He  reports that he has never smoked. He has never used smokeless tobacco. He reports that he does not drink alcohol and does not use drugs. He Patient     OCCUPATIONAL/ENVIRONMENTAL HISTORY  Currently works as: Retired  DOES/DOES NOT: does not have known exposure to asbestos, silica, beryllium or inhaled metals.  DOES/DOES NOT: does not have exposure to birds or exotic animals.    FAMILY HISTORY  Family History   Problem Relation Name Age of Onset    Other (Cardiac disorder) Mother      Cataracts Mother      Other (Cardiac disorder) Father      Stomach cancer Father       DOES/DOES NOT: does not have a family history of pulmonary disease.  DOES/DOES NOT: does have a family history of cancer. FATHER - STOMACH CANCER  DOES/DOES NOT: does not have a family history of autoimmune disorders.    PHYSICAL EXAM     VITAL SIGNS: There were no vitals taken for this visit.     PREVIOUS WEIGHTS:  Wt Readings from Last 3 Encounters:   10/02/23 97.5 kg (215 lb)   08/21/23 99.3 kg (219 lb)   06/13/23 98.9 kg (218 lb)       Physical Exam  /75   Pulse 80   Temp 37 °C (98.6 °F) (Temporal)   Resp 18   Ht 1.778 m (5' 10\")   Wt 96.2 kg (212 lb)   SpO2 93%   BMI 30.42 kg/m²        RESULTS/DATA     Pulmonary Function Test Results       Had done 10/9/2023  Pft // -> FEV1/FVC .74 ratio/FEV1 1.86 (64%<) /FVC 2.53 /DLCO 68%</TLC/RV 124to TLC 66<ratio - Lung " volumes confirms moderately  restrictive lung disease   Patient also has increased RV to TLC ratio indicating air   trapping        Chest Radiograph     XR chest 2 view 09/14/2023    Narrative  Interpreted By:  SANDRA NICHOLE MD  MRN: [id number]  Patient Name: RASHEEDA LAL    STUDY:  TH CHEST 2 VIEW PA AND LAT;  9/14/2023 1:32 pm    INDICATION:  cough  J20.9: Acute bronchitis.    COMPARISON:  04/24/2023.    ACCESSION NUMBER(S):  [id number]    ORDERING CLINICIAN:  LEXIE QUICK    FINDINGS:  CARDIOMEDIASTINAL SILHOUETTE:  Borderline size of the cardiac silhouette, postoperative changes of  the mediastinum and left-sided dual lead cardiac pacing device again  seen.    LUNGS:  Elevation of the right hemidiaphragm is again seen. Adjacent right  basilar streaky opacity may represent atelectasis/scarring although  small infiltrate is not fully excluded. Left lung is clear. No  pleural effusion or pneumothorax is seen.    ABDOMEN:  No remarkable upper abdominal findings.    BONES:  Multilevel anterior endplate spurring/partially bridging osteophytes  again seen throughout the thoracic spine.    Impression  1.  Elevated right hemidiaphragm similar to prior. Adjacent right  basilar streaky opacity may represent atelectasis/scarring although  small infiltrate is not fully excluded.      Chest CT Scan     CT Chest - 1/17/22    IMPRESSION:  1. No pulmonary embolism.  2. No pulmonary venous stenosis.  3. Small bilateral pleural effusions with bibasilar atelectasis or  infiltrates.  4. 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 There  is considerable collateral flow in the mediastinum and chest wall.  5. Small ground-glass opacity right upper lobe consistent with  pneumonitis/pneumonia.  6. Prosthetic mitral valve.      Echocardiogram     Interpretation Summary - 2/12/23  Testing    AV sequential or dual chamber electronic  "pacemaker  When compared with ECG of 16-JAN-2022 16:44,  Electronic ventricular pacemaker has replaced Electronic atrial pacemaker  Confirmed by ASYA FLOR MD ([id number]) on 5/3/2022 11:07:01 AM       Labwork   Complete Blood Count  Lab Results   Component Value Date    WBC 4.7 06/02/2023    HGB 13.3 (L) 06/02/2023    HCT 39.7 (L) 06/02/2023    MCV 95 06/02/2023     (L) 06/02/2023       Peripheral Eosinophil Count/Percentage:   Eosinophils Absolute (x10E9/L)   Date Value   04/25/2022 0.23     Eosinophils % (%)   Date Value   04/25/2022 3.3       Serum Immunoglobulin E:    No results found for: \"IGE\"       ASSESSMENT/PLAN     Mr. Tay is a 78 y.o. male and  has a past medical history of Acute myocardial infarction, unspecified (CMS/HCC) (11/17/2021), Epididymitis (06/02/2022), Essential (primary) hypertension (12/08/2022), Obesity, unspecified (04/04/2022), Obesity, unspecified (06/08/2022), Obesity, unspecified (01/10/2022), Obesity, unspecified (01/31/2022), Other abnormal findings in urine (05/09/2022), Other specified disorders of kidney and ureter, Other specified disorders of kidney and ureter (05/09/2022), Other specified disorders of the male genital organs (05/09/2022), Other symptoms and signs involving the musculoskeletal system (11/17/2021), Personal history of other diseases of the circulatory system (11/17/2021), Personal history of other diseases of the respiratory system (12/09/2021), Personal history of other specified conditions (04/04/2022), Personal history of other specified conditions, Personal history of pneumonia (recurrent) (01/31/2022), Prediabetes (06/18/2018), Scrotal pain (05/09/2022), Shortness of breath (12/09/2022), Unspecified cataract (02/22/2017), Unspecified cataract (02/22/2017), and Unspecified osteoarthritis, unspecified site. He presents to the [facility] for follow up of PFT results     Problem List and Orders      Assessment and " Plan / Recommendations:  Mr. Tay is a 78 y.o man, nonsmoker, being evaluated for dyspnea .   Patient experienced dyspnea before Afib, now that is controlled continues to have dyspnea. PFTS reviewed from 2021 revealing restrictive disease.      Sept CXR with right hemidiaphraghm  - reviewed since 2015 right mild hemidiaphragm present   PFT s reviewed - Lung volumes confirms moderately  restrictive lung disease   Patient also has increased RV to TLC ratio indicating air trapping  Unclear why mip/mep and FENO not completed  Refer to Cardiology for Htn/afib optimization  Recommend weight loss and cardio-pulmonary rehab         F/U as needed in 3 months

## 2023-10-17 ENCOUNTER — OFFICE VISIT (OUTPATIENT)
Dept: PULMONOLOGY | Facility: CLINIC | Age: 78
End: 2023-10-17
Payer: MEDICARE

## 2023-10-17 VITALS
OXYGEN SATURATION: 93 % | HEART RATE: 80 BPM | RESPIRATION RATE: 18 BRPM | WEIGHT: 212 LBS | TEMPERATURE: 98.6 F | HEIGHT: 70 IN | DIASTOLIC BLOOD PRESSURE: 75 MMHG | SYSTOLIC BLOOD PRESSURE: 149 MMHG | BODY MASS INDEX: 30.35 KG/M2

## 2023-10-17 DIAGNOSIS — R06.89 DYSPNEA AND RESPIRATORY ABNORMALITIES: ICD-10-CM

## 2023-10-17 DIAGNOSIS — R06.00 DYSPNEA AND RESPIRATORY ABNORMALITIES: ICD-10-CM

## 2023-10-17 DIAGNOSIS — J98.6 CHRONICALLY ELEVATED HEMIDIAPHRAGM: Primary | ICD-10-CM

## 2023-10-17 PROCEDURE — 1126F AMNT PAIN NOTED NONE PRSNT: CPT | Performed by: NURSE PRACTITIONER

## 2023-10-17 PROCEDURE — 3077F SYST BP >= 140 MM HG: CPT | Performed by: NURSE PRACTITIONER

## 2023-10-17 PROCEDURE — 1036F TOBACCO NON-USER: CPT | Performed by: NURSE PRACTITIONER

## 2023-10-17 PROCEDURE — 99214 OFFICE O/P EST MOD 30 MIN: CPT | Performed by: NURSE PRACTITIONER

## 2023-10-17 PROCEDURE — 1160F RVW MEDS BY RX/DR IN RCRD: CPT | Performed by: NURSE PRACTITIONER

## 2023-10-17 PROCEDURE — 3078F DIAST BP <80 MM HG: CPT | Performed by: NURSE PRACTITIONER

## 2023-10-17 PROCEDURE — 1159F MED LIST DOCD IN RCRD: CPT | Performed by: NURSE PRACTITIONER

## 2023-10-17 RX ORDER — WARFARIN SODIUM 5 MG/1
5 TABLET ORAL DAILY
COMMUNITY
End: 2024-04-04 | Stop reason: SDUPTHER

## 2023-10-17 ASSESSMENT — ENCOUNTER SYMPTOMS
LOSS OF SENSATION IN FEET: 0
DEPRESSION: 0
OCCASIONAL FEELINGS OF UNSTEADINESS: 0

## 2023-10-17 ASSESSMENT — PATIENT HEALTH QUESTIONNAIRE - PHQ9
2. FEELING DOWN, DEPRESSED OR HOPELESS: NOT AT ALL
SUM OF ALL RESPONSES TO PHQ9 QUESTIONS 1 AND 2: 0
1. LITTLE INTEREST OR PLEASURE IN DOING THINGS: NOT AT ALL

## 2023-10-17 ASSESSMENT — PAIN SCALES - GENERAL: PAINLEVEL: 0-NO PAIN

## 2023-10-17 ASSESSMENT — COLUMBIA-SUICIDE SEVERITY RATING SCALE - C-SSRS
6. HAVE YOU EVER DONE ANYTHING, STARTED TO DO ANYTHING, OR PREPARED TO DO ANYTHING TO END YOUR LIFE?: NO
1. IN THE PAST MONTH, HAVE YOU WISHED YOU WERE DEAD OR WISHED YOU COULD GO TO SLEEP AND NOT WAKE UP?: NO
2. HAVE YOU ACTUALLY HAD ANY THOUGHTS OF KILLING YOURSELF?: NO

## 2023-10-24 ENCOUNTER — OFFICE VISIT (OUTPATIENT)
Dept: PRIMARY CARE | Facility: CLINIC | Age: 78
End: 2023-10-24
Payer: MEDICARE

## 2023-10-24 VITALS
HEART RATE: 86 BPM | OXYGEN SATURATION: 95 % | WEIGHT: 213 LBS | BODY MASS INDEX: 30.56 KG/M2 | TEMPERATURE: 97.6 F | RESPIRATION RATE: 18 BRPM | SYSTOLIC BLOOD PRESSURE: 126 MMHG | DIASTOLIC BLOOD PRESSURE: 74 MMHG

## 2023-10-24 DIAGNOSIS — R09.81 NASAL CONGESTION: ICD-10-CM

## 2023-10-24 DIAGNOSIS — J00 ACUTE NASOPHARYNGITIS: Primary | ICD-10-CM

## 2023-10-24 LAB — POC RAPID STREP: NEGATIVE

## 2023-10-24 PROCEDURE — 3078F DIAST BP <80 MM HG: CPT | Performed by: NURSE PRACTITIONER

## 2023-10-24 PROCEDURE — 99214 OFFICE O/P EST MOD 30 MIN: CPT | Performed by: NURSE PRACTITIONER

## 2023-10-24 PROCEDURE — 1126F AMNT PAIN NOTED NONE PRSNT: CPT | Performed by: NURSE PRACTITIONER

## 2023-10-24 PROCEDURE — 1160F RVW MEDS BY RX/DR IN RCRD: CPT | Performed by: NURSE PRACTITIONER

## 2023-10-24 PROCEDURE — 3074F SYST BP LT 130 MM HG: CPT | Performed by: NURSE PRACTITIONER

## 2023-10-24 PROCEDURE — 87880 STREP A ASSAY W/OPTIC: CPT | Performed by: NURSE PRACTITIONER

## 2023-10-24 PROCEDURE — 87635 SARS-COV-2 COVID-19 AMP PRB: CPT

## 2023-10-24 PROCEDURE — 1036F TOBACCO NON-USER: CPT | Performed by: NURSE PRACTITIONER

## 2023-10-24 PROCEDURE — 1159F MED LIST DOCD IN RCRD: CPT | Performed by: NURSE PRACTITIONER

## 2023-10-24 ASSESSMENT — ENCOUNTER SYMPTOMS: RHINORRHEA: 1

## 2023-10-24 NOTE — PROGRESS NOTES
Subjective   Patient ID: Mando Tay is a 78 y.o. male who presents for URI.  Patient had similar symptoms about a month ago and was first treated with azithromycin, which was not effective, and then he was on clindamycin, which he states did relieve his symptoms. He does state he tested positive for COVID-19 during his illness last month. The current symptoms started 2 days ago  URI   Episode onset: Sunday. There has been no fever. Associated symptoms include congestion and rhinorrhea.    Review of Systems   HENT:  Positive for congestion and rhinorrhea.      Objective   /74   Pulse 86   Temp 36.4 °C (97.6 °F)   Resp 18   Wt 96.6 kg (213 lb)   SpO2 95%   BMI 30.56 kg/m²   Physical Exam  Vitals reviewed.   Constitutional:       Appearance: Normal appearance.   HENT:      Head: Normocephalic.      Nose: Mucosal edema, congestion and rhinorrhea present.      Mouth/Throat:      Mouth: Mucous membranes are moist.      Pharynx: Posterior oropharyngeal erythema present. No oropharyngeal exudate.   Eyes:      Conjunctiva/sclera: Conjunctivae normal.   Cardiovascular:      Rate and Rhythm: Normal rate and regular rhythm.      Pulses: Normal pulses.      Heart sounds: Normal heart sounds.   Pulmonary:      Effort: Pulmonary effort is normal.      Breath sounds: Normal breath sounds.   Abdominal:      General: Bowel sounds are normal.      Palpations: Abdomen is soft.   Musculoskeletal:      Cervical back: Neck supple.   Skin:     General: Skin is warm and dry.   Neurological:      General: No focal deficit present.      Mental Status: He is alert.   Psychiatric:         Mood and Affect: Mood normal.         Thought Content: Thought content normal.     Assessment/Plan   Problem List Items Addressed This Visit    None  Visit Diagnoses       Acute nasopharyngitis    -  Primary    Relevant Orders    POCT rapid strep A manually resulted (Completed)    Nasal congestion        Relevant Orders    Sars-CoV-2 PCR,  Symptomatic          Advised to clean his CPAP machine/mask/tubing regularly  May use Zyrtec and Flonase for symptoms.  Follow-up with your PCP next week as previously scheduled.

## 2023-10-25 LAB — SARS-COV-2 RNA RESP QL NAA+PROBE: NOT DETECTED

## 2023-10-26 ENCOUNTER — LAB (OUTPATIENT)
Dept: LAB | Facility: LAB | Age: 78
End: 2023-10-26
Payer: MEDICARE

## 2023-10-26 DIAGNOSIS — Z79.01 LONG TERM (CURRENT) USE OF ANTICOAGULANTS: Primary | ICD-10-CM

## 2023-10-26 LAB
INR PPP: 3.2 (ref 0.9–1.1)
PROTHROMBIN TIME: 36.4 SECONDS (ref 9.8–12.8)

## 2023-10-26 PROCEDURE — 85610 PROTHROMBIN TIME: CPT

## 2023-10-26 PROCEDURE — 36415 COLL VENOUS BLD VENIPUNCTURE: CPT

## 2023-10-30 ENCOUNTER — OFFICE VISIT (OUTPATIENT)
Dept: CARDIOLOGY | Facility: CLINIC | Age: 78
End: 2023-10-30
Payer: MEDICARE

## 2023-10-30 VITALS
HEIGHT: 70 IN | DIASTOLIC BLOOD PRESSURE: 80 MMHG | HEART RATE: 70 BPM | BODY MASS INDEX: 30.06 KG/M2 | WEIGHT: 210 LBS | SYSTOLIC BLOOD PRESSURE: 170 MMHG

## 2023-10-30 DIAGNOSIS — Z79.899 MEDICATION DOSE CHANGED: ICD-10-CM

## 2023-10-30 DIAGNOSIS — E78.2 MIXED HYPERLIPIDEMIA: ICD-10-CM

## 2023-10-30 DIAGNOSIS — I50.32 CHRONIC DIASTOLIC CONGESTIVE HEART FAILURE, NYHA CLASS 2 (MULTI): ICD-10-CM

## 2023-10-30 DIAGNOSIS — I05.9 MITRAL VALVE DISEASE: ICD-10-CM

## 2023-10-30 DIAGNOSIS — I50.30 DIASTOLIC HEART FAILURE, NYHA CLASS 1 (MULTI): ICD-10-CM

## 2023-10-30 DIAGNOSIS — I10 ESSENTIAL HYPERTENSION: ICD-10-CM

## 2023-10-30 PROCEDURE — 1160F RVW MEDS BY RX/DR IN RCRD: CPT | Performed by: INTERNAL MEDICINE

## 2023-10-30 PROCEDURE — 1126F AMNT PAIN NOTED NONE PRSNT: CPT | Performed by: INTERNAL MEDICINE

## 2023-10-30 PROCEDURE — 3077F SYST BP >= 140 MM HG: CPT | Performed by: INTERNAL MEDICINE

## 2023-10-30 PROCEDURE — 3079F DIAST BP 80-89 MM HG: CPT | Performed by: INTERNAL MEDICINE

## 2023-10-30 PROCEDURE — 99214 OFFICE O/P EST MOD 30 MIN: CPT | Performed by: INTERNAL MEDICINE

## 2023-10-30 PROCEDURE — 1036F TOBACCO NON-USER: CPT | Performed by: INTERNAL MEDICINE

## 2023-10-30 PROCEDURE — 1159F MED LIST DOCD IN RCRD: CPT | Performed by: INTERNAL MEDICINE

## 2023-10-30 RX ORDER — CARVEDILOL 25 MG/1
25 TABLET ORAL 2 TIMES DAILY
Qty: 180 TABLET | Refills: 2 | Status: SHIPPED | OUTPATIENT
Start: 2023-10-30 | End: 2024-04-04 | Stop reason: SDUPTHER

## 2023-10-30 RX ORDER — PRAVASTATIN SODIUM 20 MG/1
20 TABLET ORAL DAILY
Qty: 90 TABLET | Refills: 2 | Status: SHIPPED | OUTPATIENT
Start: 2023-10-30 | End: 2024-03-14 | Stop reason: SDUPTHER

## 2023-10-30 RX ORDER — EZETIMIBE 10 MG/1
10 TABLET ORAL NIGHTLY
Qty: 90 TABLET | Refills: 2 | Status: SHIPPED | OUTPATIENT
Start: 2023-10-30 | End: 2024-04-04 | Stop reason: SDUPTHER

## 2023-10-30 RX ORDER — AMLODIPINE BESYLATE 10 MG/1
10 TABLET ORAL DAILY
Qty: 90 TABLET | Refills: 2 | Status: SHIPPED | OUTPATIENT
Start: 2023-10-30 | End: 2024-04-04 | Stop reason: SDUPTHER

## 2023-10-30 NOTE — PROGRESS NOTES
Patient:  Mando Tay  YOB: 1945  MRN: [id number]       Impression/Plan:      Essential hypertension-suboptimal control continue cardiac medications and optimize his diet.  He has not been watching the processed foods as well as he usually does.  He also says is not usually this high when he takes it at home.  Addition of Jardiance may well improve blood pressure control we will not make further medication changes until follow-up with Jardiance  -     amLODIPine (Norvasc) 10 mg tablet; Take 1 tablet (10 mg) by mouth once daily.  -     carvedilol (Coreg) 25 mg tablet; Take 1 tablet (25 mg) by mouth 2 times a day.  -     Follow Up In Cardiology; Future    2.  Mixed hyperlipidemia  -     ezetimibe (Zetia) 10 mg tablet; Take 1 tablet (10 mg) by mouth once daily at bedtime.  -     pravastatin (Pravachol) 20 mg tablet; Take 1 tablet (20 mg) by mouth once daily.    3.  Chronic diastolic congestive heart failure, NYHA class 2 (CMS/HCC)-I believe his shortness of breath is related to chronic diastolic CHF and probably a component of restrictive lung disease given poor diaphragmatic motion and obesity.  Encouraged tighter control of his diet and avoidance of processed foods and we will add Jardiance to his medical regimen monitoring renal function and glucose carefully.  His diabetes has not been optimally controlled with consistently elevated hemoglobin A1c.  -     empagliflozin (Jardiance) 10 mg; Take 1 tablet (10 mg) by mouth once daily.  -     Basic Metabolic Panel; Future    Mitral valve disease-clinically valve is functioning well.  Echo within the last 3 years showing normal mitral valve function.  On long-term anticoagulation with warfarin.    Medication dose changed  -     Basic Metabolic Panel; Future  -     Follow Up In Cardiology; Future      Chief Complaint/Active Symptoms:       Mando Tay is a 78 y.o. male who presents with paroxysmal atrial flutter, previous mechanical mitral valve replacement  for mitral valve prolapse and endocarditis in 1998, sick sinus syndrome with permanent pacemaker.  Coronary angiography at that time with trivial irregularities circumflex only.  He has longstanding hypertension and diabetes.  From medical standpoint also with history of restrictive lung disease, T-cell lymphoma diagnosed in 2015.    FALL 2022 he had symptoms of shortness of breath and CHF felt related to atrial flutter. He was put on flecainide cardioverted initially felt better but then further deteriorated had episodes of pneumonia some unexplained shortness of breath with eventually was felt related to flecainide.. He felt better off the flecainide which was discontinued in April. There is a 5-10% incidence of sensation of dyspnea associated with flecainide. Then became ill again with orchiditis. Subsequent CT scan demonstrated a right renal cyst and a left renal mass. He has since seen urology who feels it very likely could be malignancy but because of its size metastatic progression relatively unlikely and and serial imaging is planned.     Last in this office 4/24/2022 not long after having seen urology where his renal mass was enhancing but stable and it was still to be followed by serial imaging.  He would note occasional shortness of breath if he walks more than what he usually does.  No particular change.  functional class II diastolic CHF at that time.  His mitral valve continue to function well no recurrence of his flutter    Seen by pulmonary earlier this month with abnormal PFTs consistent with restrictive disease.  Recommendation was to refer to cardiology for hypertension A-fib optimization although they do not describe A-fib.  Interestingly, there was no EKG to demonstrate atrial fibrillation or flutter and the patient does not believe he has been in atrial fibrillation or flutter.  Blood pressure however has been suboptimally controlled    He says overall he feels reasonably well.  His breathing  is fair but he does get short of breath if he goes up the stairs or walks too fast.  He has slight lower extremity edema.  Importantly he has not had issues with urinary tract infections.  He notes no palpitation, lightheadedness or syncope.  He has no PND or orthopnea.  He has not had hospitalizations or emergency room visits.  He has had no bleeding on anticoagulation                Review of Systems: Unremarkable except as noted above    Meds     Current Outpatient Medications   Medication Instructions    amLODIPine (NORVASC) 10 mg, oral, Daily    carvedilol (COREG) 25 mg, oral, 2 times daily    cyanocobalamin (VITAMIN B-12) 100 mcg, oral, Daily    empagliflozin (JARDIANCE) 10 mg, oral, Daily    ezetimibe (ZETIA) 10 mg, oral, Nightly    fluticasone (Flonase) 50 mcg/actuation nasal spray 1 spray, Each Nostril, Daily    melatonin 5 mg capsule 1 capsule, oral, Nightly PRN    metFORMIN (Glucophage) 500 mg tablet 1 tablet, oral, 2 times daily with meals    pravastatin (PRAVACHOL) 20 mg, oral, Daily    sertraline (Zoloft) 100 mg tablet 1 tablet, oral, Daily    tamsulosin (FLOMAX) 0.4 mg, oral, Daily    warfarin (COUMADIN) 5 mg, oral, Daily,  Tues and Thurs  - takes 1.5 tabs        Allergies     Allergies   Allergen Reactions    Atorvastatin Myalgia    Flecainide Unknown    Rosuvastatin Myalgia    Statins-Hmg-Coa Reductase Inhibitors Unknown     Statins do not work    Sulfa (Sulfonamide Antibiotics) Unknown    Procainamide Rash         Annotated Problems     Specialty Problems          Cardiology Problems    History of mitral valve replacement     1998 [employer] mechanical mitral valve replacement         Presence of cardiac pacemaker      2/18/15 generator change to Med[employer]onic Adapta L       10/04 Generator change to Med[employer]onic [place]  [id number]      1998 first device (pacesetter) placed due to CHB after MVR surgery.         Essential hypertension    Mitral valve disease     · 12/7/2021 echo LVEF 60%. LVH. RVSP 44 mm.  2+ AI unchanged. Normal function      mechanical mitral valve       History of mitral valve prolapse with mitral valve replacement mechanical [employer]        valve 1998 secondary to severe MR and endocarditis   History of mitral valve prolapse with mitral valve replacement mechanical [employer]      valve 1998 secondary to severe MR and endocarditis      Late 1997 MI- Felt due to emboli from mitral valve vegetation.         Atypical atrial flutter (CMS/HCC)    Anticoagulant long-term use    Hyperlipidemia      · 1997 trivial coronary irreg in cx. normal LV, severe MR w/subsequent MVR.          Paroxysmal atrial fibrillation (CMS/HCC)    Paroxysmal atrial flutter (CMS/HCC)    Shortness of breath on exertion    Sick sinus syndrome (CMS/HCC)    Chronic diastolic congestive heart failure, NYHA class 2 (CMS/HCC)        Problem List     Patient Active Problem List    Diagnosis Date Noted    Chronic diastolic congestive heart failure, NYHA class 2 (CMS/HCC) 10/30/2023    Medication dose changed 10/30/2023    Adrenal nodule (CMS/HCC) 10/01/2023    Renal mass, left 10/01/2023    Arthritis 10/01/2023    Atypical depressive disorder 10/01/2023    Bilateral dry eyes 10/01/2023    BPPV (benign paroxysmal positional vertigo) 10/01/2023    Combined form of age-related cataract, both eyes 10/01/2023    Cyst of testis 10/01/2023    Dysphagia 10/01/2023    Edema 10/01/2023    Fatigue 10/01/2023    Hydrocele, bilateral 10/01/2023    Hyperlipidemia 10/01/2023    Hyperopia with presbyopia 10/01/2023    MGD (meibomian gland disease) 10/01/2023    Ocular migraine 10/01/2023    Orchitis and epididymitis 10/01/2023    Paroxysmal atrial fibrillation (CMS/HCC) 10/01/2023    Retinal scar of right eye 10/01/2023    Shortness of breath on exertion 10/01/2023    Sick sinus syndrome (CMS/HCC) 10/01/2023    Superficial basal cell carcinoma of skin of left shoulder 10/01/2023    Vestibulopathy 10/01/2023    Vitamin B 12 deficiency 10/01/2023     Vitamin D deficiency 10/01/2023    Hypoxia 10/01/2023    Anticoagulant long-term use 10/01/2023    Bilateral sensorineural hearing loss 10/01/2023    Chronic nasal congestion 10/01/2023    Class 1 obesity with body mass index (BMI) of 32.0 to 32.9 in adult 10/01/2023    Degenerative cervical spinal stenosis 10/01/2023    Diabetes mellitus treated with oral medication (CMS/[facility]) 10/01/2023    High risk medication use 10/01/2023    History of snoring 10/01/2023    Hypertrophy of inferior nasal turbinate 10/01/2023    Paroxysmal atrial flutter (CMS/HCC) 10/01/2023    Tinnitus 10/01/2023    Agent orange exposure 08/21/2023    Atypical atrial flutter (CMS/HCC) 08/21/2023    BPH with obstruction/lower urinary tract symptoms 08/21/2023    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    Erectile dysfunction 08/21/2023    Major depressive disorder with single episode, in full remission (CMS/[facility]) 08/21/2023    Acute bronchitis due to other specified organisms 08/21/2023    Hemangioma of skin and subcutaneous tissue 06/20/2023    Actinic keratosis 06/20/2023    Other seborrheic keratosis 06/20/2023    Melanocytic nevi of scalp and neck 06/20/2023    Melanocytic nevi of trunk 06/20/2023    Melanocytic nevi of unspecified lower limb, including hip 06/20/2023    Melanocytic nevi of unspecified part of face 06/20/2023    Melanocytic nevi of unspecified upper limb, including shoulder 06/20/2023    Neoplasm of uncertain behavior of skin 06/20/2023    Other hypertrophic disorders of the skin 06/20/2023    Other melanin hyperpigmentation 06/20/2023    Personal history of other malignant neoplasm of skin 06/20/2023    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    Viral wart, unspecified 06/20/2023    Lumbar stenosis with neurogenic claudication 03/17/2020    Myalgia, unspecified site 09/03/2019    Radial styloid tenosynovitis 06/13/2019    RAMSES (obstructive sleep apnea) 02/27/2018    Essential hypertension 02/27/2018    Mitral valve  "disease 02/27/2018    Presence of cardiac pacemaker 01/24/2018    History of mitral valve replacement 01/24/2018    Cervical spondylosis without myelopathy 02/02/2016    Chondromalacia of patella 01/19/2016    Primary localized osteoarthrosis of shoulder region 01/19/2016    Adhesive capsulitis of shoulder 01/19/2016       Objective:     Vitals:    10/30/23 1149   BP: 170/80   BP Location: Left arm   Patient Position: Sitting   Pulse: 70   Weight: 95.3 kg (210 lb)   Height: 1.778 m (5' 10\")      Wt Readings from Last 4 Encounters:   10/30/23 95.3 kg (210 lb)   10/24/23 96.6 kg (213 lb)   10/17/23 96.2 kg (212 lb)   10/02/23 97.5 kg (215 lb)           LAB:     Lab Results   Component Value Date    WBC 4.7 06/02/2023    HGB 13.3 (L) 06/02/2023    HCT 39.7 (L) 06/02/2023     (L) 06/02/2023    CHOL 159 06/02/2023    TRIG 64 06/02/2023    HDL 48.7 06/02/2023    ALT 22 09/07/2023    AST 22 09/07/2023     09/07/2023    K 3.9 09/07/2023     09/07/2023    CREATININE 1.22 09/07/2023    BUN 19 09/07/2023    CO2 29 09/07/2023    TSH 1.34 04/04/2022    PSA 4.59 (H) 02/01/2023    INR 3.2 (H) 10/26/2023    HGBA1C 6.7 (A) 09/07/2023       Diagnostic Studies:     No results found.      Radiology:     No orders to display       Physical Exam     General Appearance: alert and oriented to person, place and time, in no acute distress  Cardiovascular: normal rate, regular rhythm, normal S1 and S2, 1/6 systolic flow murmur, no rubs, clicks, or gallops,  no JVD crisp mechanical mitral valve sounds  Pulmonary/Chest: clear to auscultation bilaterally- no wheezes, rales or rhonchi, normal air movement, no respiratory distress  Abdomen: soft, non-tender, non-distended, normal bowel sounds, no masses   Extremities: no cyanosis, clubbing.  Trace edema  Skin: warm and dry, no rash or erythema  Eyes: EOMI  Neck: supple and non-tender without mass, no thyromegaly   Neurological: alert, oriented, normal speech, no focal findings " or movement disorder noted  Vascular: 2+ pulses

## 2023-10-31 ENCOUNTER — ANTICOAGULATION - WARFARIN VISIT (OUTPATIENT)
Dept: CARDIOLOGY | Facility: CLINIC | Age: 78
End: 2023-10-31

## 2023-10-31 ENCOUNTER — OFFICE VISIT (OUTPATIENT)
Dept: PRIMARY CARE | Facility: CLINIC | Age: 78
End: 2023-10-31
Payer: MEDICARE

## 2023-10-31 VITALS
TEMPERATURE: 97.6 F | HEIGHT: 70 IN | HEART RATE: 77 BPM | WEIGHT: 209 LBS | SYSTOLIC BLOOD PRESSURE: 137 MMHG | BODY MASS INDEX: 29.92 KG/M2 | DIASTOLIC BLOOD PRESSURE: 74 MMHG

## 2023-10-31 DIAGNOSIS — I50.30 DIASTOLIC CONGESTIVE HEART FAILURE WITH PRESERVED LEFT VENTRICULAR FUNCTION, NYHA CLASS 2 (MULTI): ICD-10-CM

## 2023-10-31 DIAGNOSIS — N13.8 BPH WITH OBSTRUCTION/LOWER URINARY TRACT SYMPTOMS: Primary | ICD-10-CM

## 2023-10-31 DIAGNOSIS — N40.1 BPH WITH OBSTRUCTION/LOWER URINARY TRACT SYMPTOMS: Primary | ICD-10-CM

## 2023-10-31 PROCEDURE — 1036F TOBACCO NON-USER: CPT | Performed by: FAMILY MEDICINE

## 2023-10-31 PROCEDURE — 3078F DIAST BP <80 MM HG: CPT | Performed by: FAMILY MEDICINE

## 2023-10-31 PROCEDURE — 1159F MED LIST DOCD IN RCRD: CPT | Performed by: FAMILY MEDICINE

## 2023-10-31 PROCEDURE — 3075F SYST BP GE 130 - 139MM HG: CPT | Performed by: FAMILY MEDICINE

## 2023-10-31 PROCEDURE — 99213 OFFICE O/P EST LOW 20 MIN: CPT | Performed by: FAMILY MEDICINE

## 2023-10-31 PROCEDURE — 1160F RVW MEDS BY RX/DR IN RCRD: CPT | Performed by: FAMILY MEDICINE

## 2023-10-31 PROCEDURE — 1126F AMNT PAIN NOTED NONE PRSNT: CPT | Performed by: FAMILY MEDICINE

## 2023-10-31 NOTE — PROGRESS NOTES
Called and left message on Mando phone regarding INR result along with continued current dosing and INR in 4 weeks.    Left message to call office with any questions.

## 2023-10-31 NOTE — TELEPHONE ENCOUNTER
Patient called into office and stated that he was prescribed Jardiance. The patient stated that the costs is $2,000 and he cannot afford that. Medicare will cover $1200 only and the rest the patient said is not in his budget. Jazz PARK

## 2023-11-01 RX ORDER — TAMSULOSIN HYDROCHLORIDE 0.4 MG/1
0.4 CAPSULE ORAL DAILY
Qty: 90 CAPSULE | Refills: 1 | Status: SHIPPED | OUTPATIENT
Start: 2023-11-01 | End: 2024-03-12 | Stop reason: SDUPTHER

## 2023-11-02 ASSESSMENT — ENCOUNTER SYMPTOMS
CONSTITUTIONAL NEGATIVE: 1
HEMATOLOGIC/LYMPHATIC NEGATIVE: 1
EYES NEGATIVE: 1
MUSCULOSKELETAL NEGATIVE: 1
PSYCHIATRIC NEGATIVE: 1
ALLERGIC/IMMUNOLOGIC NEGATIVE: 1
GASTROINTESTINAL NEGATIVE: 1
CARDIOVASCULAR NEGATIVE: 1
ENDOCRINE NEGATIVE: 1
RESPIRATORY NEGATIVE: 1

## 2023-11-02 NOTE — PROGRESS NOTES
Juan Bhat is here today for follow-up on his hypertension diabetes and hyperlipidemia.  He states that overall he has been doing well.  He did have a respiratory infection in August and was given a Z-Skyler and eventually clindamycin.  He was then referred to pulmonary for evaluation and PFTs had been done.  He was also placed on Jardiance by Dr. Celis his cardiologist.  He continues on his other meds as noted.  He has no other complaints at the present time.    Patient ID: Mando Tay is a 78 y.o. male who presents for Results (Review of blood work, CXR and Pulmonary function test):    Problem List Items Addressed This Visit       BPH with obstruction/lower urinary tract symptoms - Primary    Relevant Medications    tamsulosin (Flomax) 0.4 mg 24 hr capsule      Past Medical History:   Diagnosis Date    Acute myocardial infarction, unspecified (CMS/[facility]) 11/17/2021    Acute myocardial infarction    Epididymitis 06/02/2022    Epididymitis, right    Essential (primary) hypertension 12/08/2022    Hypertension    Obesity, unspecified 04/04/2022    Class 1 obesity with body mass index (BMI) of 31.0 to 31.9 in adult    Obesity, unspecified 06/08/2022    Class 1 obesity with body mass index (BMI) of 30.0 to 30.9 in adult    Obesity, unspecified 01/10/2022    Class 1 obesity with body mass index (BMI) of 31.0 to 31.9 in adult    Obesity, unspecified 01/31/2022    Class 1 obesity with body mass index (BMI) of 31.0 to 31.9 in adult    Other abnormal findings in urine 05/09/2022    Dark urine    Other specified disorders of kidney and ureter     Bilateral renal masses    Other specified disorders of kidney and ureter 05/09/2022    Renal mass, right    Other specified disorders of the male genital organs 05/09/2022    Swelling of right half of scrotum    Other symptoms and signs involving the musculoskeletal system 11/17/2021    Leg fatigue    Personal history of other diseases of the circulatory system 11/17/2021     History of mitral valve insufficiency    Personal history of other diseases of the respiratory system 12/09/2021    History of acute bronchitis    Personal history of other specified conditions 04/04/2022    History of shortness of breath    Personal history of other specified conditions     History of snoring    Personal history of pneumonia (recurrent) 01/31/2022    History of pneumonia    Prediabetes 06/18/2018    Borderline diabetes mellitus    Scrotal pain 05/09/2022    Acute pain in scrotum    Shortness of breath 12/09/2022    Shortness of breath on exertion    Unspecified cataract 02/22/2017    Cataract of right eye    Unspecified cataract 02/22/2017    Cataract of left eye    Unspecified osteoarthritis, unspecified site     Arthritis      Past Surgical History:   Procedure Laterality Date    CORONARY ARTERY BYPASS GRAFT  02/22/2017    Coronary Artery Surgery    HERNIA REPAIR  02/22/2017    Inguinal Hernia Repair    OTHER SURGICAL HISTORY  11/17/2021    Surgery    OTHER SURGICAL HISTORY  11/17/2021    Cardioversion    OTHER SURGICAL HISTORY  12/20/2021    Mitral valve replacement    OTHER SURGICAL HISTORY  12/20/2021    Cardiac catheterization      Family History   Problem Relation Name Age of Onset    Other (Cardiac disorder) Mother      Cataracts Mother      Other (Cardiac disorder) Father      Stomach cancer Father        Social History     Socioeconomic History    Marital status:      Spouse name: Not on file    Number of children: Not on file    Years of education: Not on file    Highest education level: Not on file   Occupational History    Not on file   Tobacco Use    Smoking status: Never    Smokeless tobacco: Never   Substance and Sexual Activity    Alcohol use: Never    Drug use: Never    Sexual activity: Not on file   Other Topics Concern    Not on file   Social History Narrative    Not on file     Social Determinants of Health     Financial Resource Strain: Not on file   Food Insecurity:  Not on file   Transportation Needs: Not on file   Physical Activity: Not on file   Stress: Not on file   Social Connections: Not on file   Intimate Partner Violence: Not on file   Housing Stability: Not on file      Atorvastatin, Flecainide, Rosuvastatin, Statins-hmg-coa reductase inhibitors, Sulfa (sulfonamide antibiotics), and Procainamide   Current Outpatient Medications   Medication Sig Dispense Refill    amLODIPine (Norvasc) 10 mg tablet Take 1 tablet (10 mg) by mouth once daily. 90 tablet 2    carvedilol (Coreg) 25 mg tablet Take 1 tablet (25 mg) by mouth 2 times a day. 180 tablet 2    cyanocobalamin (Vitamin B-12) 1,000 mcg tablet Take 100 mcg by mouth once daily.      empagliflozin (Jardiance) 10 mg Take 1 tablet (10 mg) by mouth once daily. 90 tablet 2    ezetimibe (Zetia) 10 mg tablet Take 1 tablet (10 mg) by mouth once daily at bedtime. 90 tablet 2    fluticasone (Flonase) 50 mcg/actuation nasal spray Administer 1 spray into each nostril once daily.      melatonin 5 mg capsule Take 1 capsule by mouth as needed at bedtime.      metFORMIN (Glucophage) 500 mg tablet Take 1 tablet (500 mg) by mouth 2 times a day with meals.      pravastatin (Pravachol) 20 mg tablet Take 1 tablet (20 mg) by mouth once daily. 90 tablet 2    sertraline (Zoloft) 100 mg tablet Take 1 tablet (100 mg) by mouth once daily.      warfarin (Coumadin) 5 mg tablet Take 1 tablet (5 mg) by mouth once daily.  Tues and Thurs  - takes 1.5 tabs      tamsulosin (Flomax) 0.4 mg 24 hr capsule Take 1 capsule (0.4 mg) by mouth once daily. 90 capsule 1     No current facility-administered medications for this visit.       Immunization History   Administered Date(s) Administered    Flu vaccine (IIV4), preservative free *Check age/dose* 10/08/2020    Flu vaccine, quadrivalent, high-dose, preservative free, age 65y+ (FLUZONE) 10/03/2023    Influenza, High Dose Seasonal, Preservative Free 11/30/2017, 10/16/2018, 10/17/2019    Influenza, Seasonal,  Quadrivalent, Adjuvanted 11/22/2021, 11/07/2022    Influenza, Unspecified 10/01/2010, 01/01/2014, 02/01/2015, 10/01/2018, 11/01/2022    Influenza, live, intranasal, quadrivalent 11/22/2021    Influenza, seasonal, injectable 01/15/2014, 11/28/2018, 08/01/2019, 10/01/2020, 09/13/2021, 09/19/2022    Pfizer Gray Cap SARS-CoV-2 05/05/2022    Pfizer Purple Cap SARS-CoV-2 02/10/2021, 03/02/2021, 10/13/2021, 02/07/2022    Pneumococcal conjugate vaccine, 13-valent (PREVNAR 13) 03/03/2017    Pneumococcal polysaccharide vaccine, 23-valent, age 2 years and older (PNEUMOVAX 23) 02/10/2014, 07/30/2019, 08/16/2021    Zoster vaccine, recombinant, adult (SHINGRIX) 06/19/2019, 08/19/2019    Zoster, Unspecified 07/01/2019, 08/31/2019        Review of Systems   Constitutional: Negative.    HENT: Negative.     Eyes: Negative.    Respiratory: Negative.     Cardiovascular: Negative.    Gastrointestinal: Negative.    Endocrine: Negative.    Genitourinary: Negative.    Musculoskeletal: Negative.    Skin: Negative.    Allergic/Immunologic: Negative.    Hematological: Negative.    Psychiatric/Behavioral: Negative.     All other systems reviewed and are negative.       Vitals:    10/31/23 1305   BP: 137/74   Pulse: 77   Temp: 36.4 °C (97.6 °F)     Vitals:    10/31/23 1305   Weight: 94.8 kg (209 lb)      Physical Exam  Constitutional:       General: He is not in acute distress.     Appearance: Normal appearance.   Cardiovascular:      Rate and Rhythm: Normal rate and regular rhythm.      Pulses: Normal pulses.      Heart sounds: Normal heart sounds.   Pulmonary:      Effort: Pulmonary effort is normal.      Breath sounds: Normal breath sounds.   Neurological:      Mental Status: He is alert.          ASSESSMENT/PLAN: Diabetes mellitus type 2  Hypertension  Hyperlipidemia  Vitamin B12 deficiency  Renal mass followed by urology    Plan-continue current medications as noted  Labs as noted are up-to-date  Follow-up with cardiology and  urology  Exercise regularly  Follow-up with pulmonology as well  Follow-up 4 months and call as needed

## 2023-11-03 NOTE — TELEPHONE ENCOUNTER
Spoke with pt who states that he does have supplemental insurance, and will still cost out of pocket $700. The dx that was submitted with script was  Diastolic heart failure, NYHA class 1 (CMS/[facility]) [I50.30]. Please advise. Eliu

## 2023-11-06 RX ORDER — DAPAGLIFLOZIN 10 MG/1
10 TABLET, FILM COATED ORAL DAILY
Qty: 30 TABLET | Refills: 11 | Status: SHIPPED | OUTPATIENT
Start: 2023-11-06 | End: 2024-04-09

## 2023-11-06 NOTE — TELEPHONE ENCOUNTER
Called and spoke with pt; pt prefers to have office send script for Farixga 10mg daily to local pharmacy. Pt states that he will contact insurance if Farxiga is not covered. Rx pended for approval. Pt declines rx for Jardiance stating that they cover Jardiance, but a very small portion. Eliu

## 2023-11-06 NOTE — TELEPHONE ENCOUNTER
When trying to reorder Farixga it has been flagged that it is not on pt's formulary and is requesting alternatives of Jardiance 10mg or 25mg. Invokana 100mg or 300mg. Please advise. Eliu

## 2023-11-07 ENCOUNTER — TELEPHONE (OUTPATIENT)
Dept: CARDIOLOGY | Facility: CLINIC | Age: 78
End: 2023-11-07
Payer: MEDICARE

## 2023-11-07 NOTE — TELEPHONE ENCOUNTER
Received voice mail from patient stating Jardiance and Farxiga are both too expensive even with insurance.  Patient states best price for Jardiance would be $300.00 for 30 days and $500.00 for Farxiga for 30 days.  Note to Dr. Celis.  Tiera Francis, [facility]

## 2023-11-07 NOTE — TELEPHONE ENCOUNTER
Called and was unable to get a hold of the patient. Left voicemail for patient to call the office back. Jazz PARK

## 2023-11-08 NOTE — TELEPHONE ENCOUNTER
11/8/23- Pt returned call and advised of Dr. Celis message.    Pt asking if he still needs to have labs done ordered at last appt and if he should keep appt 11/29/*23 or if Dr. Celis wants to push appt back a little more. Routed to Dr. Celis.

## 2023-11-09 ENCOUNTER — APPOINTMENT (OUTPATIENT)
Dept: PULMONOLOGY | Facility: CLINIC | Age: 78
End: 2023-11-09
Payer: MEDICARE

## 2023-11-09 NOTE — TELEPHONE ENCOUNTER
11/09/23 Patient called and made aware of below note from Dr. Celis and moved his appointment to February.

## 2023-11-21 ENCOUNTER — LAB (OUTPATIENT)
Dept: LAB | Facility: LAB | Age: 78
End: 2023-11-21
Payer: MEDICARE

## 2023-11-21 DIAGNOSIS — Z79.01 LONG TERM (CURRENT) USE OF ANTICOAGULANTS: Primary | ICD-10-CM

## 2023-11-21 LAB
INR PPP: 2.8 (ref 0.9–1.1)
PROTHROMBIN TIME: 31.8 SECONDS (ref 9.8–12.8)

## 2023-11-21 PROCEDURE — 36415 COLL VENOUS BLD VENIPUNCTURE: CPT

## 2023-11-21 PROCEDURE — 85610 PROTHROMBIN TIME: CPT

## 2023-11-22 ENCOUNTER — TELEPHONE (OUTPATIENT)
Dept: PULMONOLOGY | Facility: CLINIC | Age: 78
End: 2023-11-22
Payer: MEDICARE

## 2023-11-22 ENCOUNTER — ANTICOAGULATION - WARFARIN VISIT (OUTPATIENT)
Dept: CARDIOLOGY | Facility: CLINIC | Age: 78
End: 2023-11-22
Payer: MEDICARE

## 2023-11-22 NOTE — PROGRESS NOTES
Called and spoke with Mando regarding his recent INR 2.8.  We will continue current coumadin dose and recheck INR in 4 weeks approximately 12/19/2023.  Mando verbalizes understanding.

## 2023-11-22 NOTE — TELEPHONE ENCOUNTER
Patient is scheduled with  11/30/23.    Provider is on call this day.    Patient will need to be rescheduled.    LMOM for patient to call the office so that we may assist in rescheduling.    Office number given on VM.

## 2023-11-24 NOTE — TELEPHONE ENCOUNTER
Patient called back.    Just asked to cancel appt.    Stated this was the third time he was rescheduled.

## 2023-11-29 ENCOUNTER — APPOINTMENT (OUTPATIENT)
Dept: CARDIOLOGY | Facility: CLINIC | Age: 78
End: 2023-11-29
Payer: MEDICARE

## 2023-11-30 ENCOUNTER — APPOINTMENT (OUTPATIENT)
Dept: PULMONOLOGY | Facility: CLINIC | Age: 78
End: 2023-11-30
Payer: MEDICARE

## 2023-12-08 ENCOUNTER — OFFICE VISIT (OUTPATIENT)
Dept: DERMATOLOGY | Facility: CLINIC | Age: 78
End: 2023-12-08
Payer: MEDICARE

## 2023-12-08 DIAGNOSIS — C84.A0 CUTANEOUS T-CELL LYMPHOMA, UNSPECIFIED BODY REGION (MULTI): Primary | ICD-10-CM

## 2023-12-08 DIAGNOSIS — L30.1 DYSHIDROSIS: ICD-10-CM

## 2023-12-08 PROCEDURE — 1160F RVW MEDS BY RX/DR IN RCRD: CPT | Performed by: STUDENT IN AN ORGANIZED HEALTH CARE EDUCATION/TRAINING PROGRAM

## 2023-12-08 PROCEDURE — 1036F TOBACCO NON-USER: CPT | Performed by: STUDENT IN AN ORGANIZED HEALTH CARE EDUCATION/TRAINING PROGRAM

## 2023-12-08 PROCEDURE — 99214 OFFICE O/P EST MOD 30 MIN: CPT | Performed by: STUDENT IN AN ORGANIZED HEALTH CARE EDUCATION/TRAINING PROGRAM

## 2023-12-08 PROCEDURE — 1159F MED LIST DOCD IN RCRD: CPT | Performed by: STUDENT IN AN ORGANIZED HEALTH CARE EDUCATION/TRAINING PROGRAM

## 2023-12-08 PROCEDURE — 1126F AMNT PAIN NOTED NONE PRSNT: CPT | Performed by: STUDENT IN AN ORGANIZED HEALTH CARE EDUCATION/TRAINING PROGRAM

## 2023-12-08 RX ORDER — BETAMETHASONE VALERATE 1 MG/G
CREAM TOPICAL 2 TIMES DAILY
Qty: 15 G | Refills: 0 | Status: SHIPPED | OUTPATIENT
Start: 2023-12-08

## 2023-12-08 NOTE — PROGRESS NOTES
Subjective     Mando Tay is a 78 y.o. male who presents for the following: Phototherapy (Follow up).     Review of Systems:  No other skin or systemic complaints other than what is documented elsewhere in the note.    The following portions of the chart were reviewed this encounter and updated as appropriate:          Skin Cancer History  No skin cancer on file.      Specialty Problems          Dermatology Problems    Actinic keratosis    Hemangioma of skin and subcutaneous tissue    Melanocytic nevi of scalp and neck    Melanocytic nevi of trunk    Melanocytic nevi of unspecified lower limb, including hip    Melanocytic nevi of unspecified part of face    Melanocytic nevi of unspecified upper limb, including shoulder    Neoplasm of uncertain behavior of skin    Other hypertrophic disorders of the skin    Other melanin hyperpigmentation    Other seborrheic keratosis    Personal history of other malignant neoplasm of skin    Pigmented purpuric dermatosis    Viral wart, unspecified    Superficial basal cell carcinoma of skin of left shoulder        Objective   Well appearing patient in no apparent distress; mood and affect are within normal limits.    A focused skin examination was performed. All findings within normal limits unless otherwise noted below.    Assessment/Plan   1. Cutaneous T-cell lymphoma, unspecified body region (CMS/HCC)  Mid Back  Skin is clear    Clear  Okay to stop phototherapy  Follow up 3 months to assess recurrence     2. Dyshidrosis  Right Hand - Anterior  Lateral fingers with deep seated pustules and scaling    Discussed diagnosis and recurrent nature  Very itchy  Continue to moisturize with Cerave  Start betamethasone 0.1% cream. Patient to apply to affected areas 2x daily x 2 weeks then 1 week off, repeat as needed. Side effects of topical steroids were reviewed including risk of skin atrophy.          FU 3 months to assess CTCL and for TBSE

## 2023-12-19 ENCOUNTER — LAB (OUTPATIENT)
Dept: LAB | Facility: LAB | Age: 78
End: 2023-12-19
Payer: MEDICARE

## 2023-12-19 DIAGNOSIS — Z79.01 LONG TERM (CURRENT) USE OF ANTICOAGULANTS: ICD-10-CM

## 2023-12-19 LAB
INR PPP: 3.1 (ref 0.9–1.1)
PROTHROMBIN TIME: 35.1 SECONDS (ref 9.8–12.8)

## 2023-12-19 PROCEDURE — 36415 COLL VENOUS BLD VENIPUNCTURE: CPT

## 2023-12-19 PROCEDURE — 85610 PROTHROMBIN TIME: CPT

## 2023-12-20 ENCOUNTER — ANTICOAGULATION - WARFARIN VISIT (OUTPATIENT)
Dept: CARDIOLOGY | Facility: CLINIC | Age: 78
End: 2023-12-20
Payer: MEDICARE

## 2023-12-20 NOTE — PROGRESS NOTES
Attempted to call  [facility] regarding INR results.  Left message to call office for instructions.    INR 3.1, will continue current dose of coumadin 7.5 mg Tue and Thurs along with Coumadin 5 mg all other days.    INR in 4 weeks approximately 1/17/2024.

## 2024-01-11 ENCOUNTER — TELEPHONE (OUTPATIENT)
Dept: PRIMARY CARE | Facility: CLINIC | Age: 79
End: 2024-01-11
Payer: MEDICARE

## 2024-01-11 DIAGNOSIS — I10 ESSENTIAL HYPERTENSION: Primary | ICD-10-CM

## 2024-01-11 DIAGNOSIS — Z79.84 DIABETES MELLITUS TREATED WITH ORAL MEDICATION (MULTI): ICD-10-CM

## 2024-01-11 DIAGNOSIS — E11.9 DIABETES MELLITUS TREATED WITH ORAL MEDICATION (MULTI): ICD-10-CM

## 2024-01-11 NOTE — TELEPHONE ENCOUNTER
Pending 1/29/2024 appointment.... patient is asking if BW is needed prior   I personally evaluated the patient. I reviewed the Resident’s and Physician Assistant’s note (as assigned above), and agree with the findings and plan except as documented in my note.

## 2024-01-22 ENCOUNTER — HOSPITAL ENCOUNTER (OUTPATIENT)
Dept: CARDIOLOGY | Age: 79
Discharge: HOME | End: 2024-01-22
Payer: MEDICARE

## 2024-01-22 PROCEDURE — 93296 REM INTERROG EVL PM/IDS: CPT

## 2024-01-23 ENCOUNTER — LAB (OUTPATIENT)
Dept: LAB | Facility: LAB | Age: 79
End: 2024-01-23
Payer: MEDICARE

## 2024-01-23 DIAGNOSIS — I50.30 DIASTOLIC HEART FAILURE, NYHA CLASS 1 (MULTI): ICD-10-CM

## 2024-01-23 DIAGNOSIS — Z79.84 DIABETES MELLITUS TREATED WITH ORAL MEDICATION (MULTI): ICD-10-CM

## 2024-01-23 DIAGNOSIS — Z79.01 LONG TERM (CURRENT) USE OF ANTICOAGULANTS: ICD-10-CM

## 2024-01-23 DIAGNOSIS — E11.9 DIABETES MELLITUS TREATED WITH ORAL MEDICATION (MULTI): ICD-10-CM

## 2024-01-23 DIAGNOSIS — Z79.899 MEDICATION DOSE CHANGED: ICD-10-CM

## 2024-01-23 LAB
ALBUMIN SERPL BCP-MCNC: 4.4 G/DL (ref 3.4–5)
ALP SERPL-CCNC: 67 U/L (ref 33–136)
ALT SERPL W P-5'-P-CCNC: 16 U/L (ref 10–52)
ANION GAP SERPL CALC-SCNC: 9 MMOL/L (ref 10–20)
AST SERPL W P-5'-P-CCNC: 18 U/L (ref 9–39)
BILIRUB SERPL-MCNC: 0.8 MG/DL (ref 0–1.2)
BUN SERPL-MCNC: 22 MG/DL (ref 6–23)
CALCIUM SERPL-MCNC: 9.3 MG/DL (ref 8.6–10.3)
CHLORIDE SERPL-SCNC: 102 MMOL/L (ref 98–107)
CHOLEST SERPL-MCNC: 152 MG/DL (ref 0–199)
CHOLESTEROL/HDL RATIO: 3
CO2 SERPL-SCNC: 32 MMOL/L (ref 21–32)
CREAT SERPL-MCNC: 1.19 MG/DL (ref 0.5–1.3)
EGFRCR SERPLBLD CKD-EPI 2021: 63 ML/MIN/1.73M*2
EST. AVERAGE GLUCOSE BLD GHB EST-MCNC: 143 MG/DL
GLUCOSE SERPL-MCNC: 109 MG/DL (ref 74–99)
HBA1C MFR BLD: 6.6 %
HDLC SERPL-MCNC: 50.9 MG/DL
INR PPP: 3 (ref 0.9–1.1)
LDLC SERPL CALC-MCNC: 86 MG/DL
NON HDL CHOLESTEROL: 101 MG/DL (ref 0–149)
POTASSIUM SERPL-SCNC: 4 MMOL/L (ref 3.5–5.3)
PROT SERPL-MCNC: 7.1 G/DL (ref 6.4–8.2)
PROTHROMBIN TIME: 33.7 SECONDS (ref 9.8–12.8)
SODIUM SERPL-SCNC: 139 MMOL/L (ref 136–145)
TRIGL SERPL-MCNC: 75 MG/DL (ref 0–149)
VLDL: 15 MG/DL (ref 0–40)

## 2024-01-23 PROCEDURE — 80061 LIPID PANEL: CPT

## 2024-01-23 PROCEDURE — 83036 HEMOGLOBIN GLYCOSYLATED A1C: CPT

## 2024-01-23 PROCEDURE — 80053 COMPREHEN METABOLIC PANEL: CPT

## 2024-01-23 PROCEDURE — 36415 COLL VENOUS BLD VENIPUNCTURE: CPT

## 2024-01-23 PROCEDURE — 85610 PROTHROMBIN TIME: CPT

## 2024-01-24 ENCOUNTER — ANTICOAGULATION - WARFARIN VISIT (OUTPATIENT)
Dept: CARDIOLOGY | Facility: CLINIC | Age: 79
End: 2024-01-24
Payer: MEDICARE

## 2024-01-24 NOTE — PROGRESS NOTES
Called and spoke with Mr Tay regarding INR 3.0  with goal of 2.5-3.5.   Recommend continuing current dose of Coumadin with INR recheck in 4 weeks approximately 2/21/24.  Segundo Tay verbalizes understanding.

## 2024-01-29 ENCOUNTER — OFFICE VISIT (OUTPATIENT)
Dept: PRIMARY CARE | Facility: CLINIC | Age: 79
End: 2024-01-29
Payer: MEDICARE

## 2024-01-29 VITALS
WEIGHT: 214 LBS | DIASTOLIC BLOOD PRESSURE: 66 MMHG | HEIGHT: 70 IN | HEART RATE: 79 BPM | BODY MASS INDEX: 30.64 KG/M2 | TEMPERATURE: 97.7 F | SYSTOLIC BLOOD PRESSURE: 137 MMHG

## 2024-01-29 DIAGNOSIS — Z78.9 NEVER SMOKED CIGARETTES: ICD-10-CM

## 2024-01-29 DIAGNOSIS — N13.8 BPH WITH OBSTRUCTION/LOWER URINARY TRACT SYMPTOMS: ICD-10-CM

## 2024-01-29 DIAGNOSIS — E11.9 DIABETES MELLITUS TREATED WITH ORAL MEDICATION (MULTI): ICD-10-CM

## 2024-01-29 DIAGNOSIS — N40.1 BPH WITH OBSTRUCTION/LOWER URINARY TRACT SYMPTOMS: ICD-10-CM

## 2024-01-29 DIAGNOSIS — Z79.84 DIABETES MELLITUS TREATED WITH ORAL MEDICATION (MULTI): ICD-10-CM

## 2024-01-29 DIAGNOSIS — I10 ESSENTIAL HYPERTENSION: ICD-10-CM

## 2024-01-29 PROCEDURE — 1036F TOBACCO NON-USER: CPT | Performed by: FAMILY MEDICINE

## 2024-01-29 PROCEDURE — 99213 OFFICE O/P EST LOW 20 MIN: CPT | Performed by: FAMILY MEDICINE

## 2024-01-29 PROCEDURE — 3078F DIAST BP <80 MM HG: CPT | Performed by: FAMILY MEDICINE

## 2024-01-29 PROCEDURE — 1159F MED LIST DOCD IN RCRD: CPT | Performed by: FAMILY MEDICINE

## 2024-01-29 PROCEDURE — 3075F SYST BP GE 130 - 139MM HG: CPT | Performed by: FAMILY MEDICINE

## 2024-01-29 PROCEDURE — 1126F AMNT PAIN NOTED NONE PRSNT: CPT | Performed by: FAMILY MEDICINE

## 2024-01-29 PROCEDURE — 1160F RVW MEDS BY RX/DR IN RCRD: CPT | Performed by: FAMILY MEDICINE

## 2024-01-29 RX ORDER — VIT C/E/ZN/COPPR/LUTEIN/ZEAXAN 250MG-90MG
25 CAPSULE ORAL DAILY
COMMUNITY

## 2024-01-29 ASSESSMENT — ENCOUNTER SYMPTOMS
GASTROINTESTINAL NEGATIVE: 1
HEMATOLOGIC/LYMPHATIC NEGATIVE: 1
CARDIOVASCULAR NEGATIVE: 1
RESPIRATORY NEGATIVE: 1
ENDOCRINE NEGATIVE: 1
ALLERGIC/IMMUNOLOGIC NEGATIVE: 1
CONSTITUTIONAL NEGATIVE: 1
PSYCHIATRIC NEGATIVE: 1
MUSCULOSKELETAL NEGATIVE: 1
EYES NEGATIVE: 1

## 2024-01-29 NOTE — PROGRESS NOTES
Juan Bhat is here for a follow-up on diabetes hypertension and hyperlipidemia.  He states that he has been overall feeling well.  He has no new complaints at the present time.  He continues on his meds noted.  He sees Dr. Celis for his cardiology care.  He also sees Dr. Harp for follow-up on his renal mass.    Patient ID: Mando Tay is a 78 y.o. male who presents for Follow-up:    Problem List Items Addressed This Visit       BPH with obstruction/lower urinary tract symptoms    Relevant Orders    Prostate Specific Antigen, Screen    Essential hypertension    Relevant Orders    Comprehensive Metabolic Panel    Hemoglobin A1C    Diabetes mellitus treated with oral medication (CMS/[facility])    Relevant Orders    Comprehensive Metabolic Panel    Hemoglobin A1C     Other Visit Diagnoses       BMI 30.0-30.9,adult        Relevant Orders    Comprehensive Metabolic Panel    Hemoglobin A1C    Never smoked cigarettes               Past Medical History:   Diagnosis Date    Acute myocardial infarction, unspecified (CMS/[facility]) 11/17/2021    Acute myocardial infarction    Epididymitis 06/02/2022    Epididymitis, right    Essential (primary) hypertension 12/08/2022    Hypertension    Obesity, unspecified 04/04/2022    Class 1 obesity with body mass index (BMI) of 31.0 to 31.9 in adult    Obesity, unspecified 06/08/2022    Class 1 obesity with body mass index (BMI) of 30.0 to 30.9 in adult    Obesity, unspecified 01/10/2022    Class 1 obesity with body mass index (BMI) of 31.0 to 31.9 in adult    Obesity, unspecified 01/31/2022    Class 1 obesity with body mass index (BMI) of 31.0 to 31.9 in adult    Other abnormal findings in urine 05/09/2022    Dark urine    Other specified disorders of kidney and ureter     Bilateral renal masses    Other specified disorders of kidney and ureter 05/09/2022    Renal mass, right    Other specified disorders of the male genital organs 05/09/2022    Swelling of right half of scrotum    Other  symptoms and signs involving the musculoskeletal system 11/17/2021    Leg fatigue    Personal history of other diseases of the circulatory system 11/17/2021    History of mitral valve insufficiency    Personal history of other diseases of the respiratory system 12/09/2021    History of acute bronchitis    Personal history of other specified conditions 04/04/2022    History of shortness of breath    Personal history of other specified conditions     History of snoring    Personal history of pneumonia (recurrent) 01/31/2022    History of pneumonia    Prediabetes 06/18/2018    Borderline diabetes mellitus    Scrotal pain 05/09/2022    Acute pain in scrotum    Shortness of breath 12/09/2022    Shortness of breath on exertion    Unspecified cataract 02/22/2017    Cataract of right eye    Unspecified cataract 02/22/2017    Cataract of left eye    Unspecified osteoarthritis, unspecified site     Arthritis      Past Surgical History:   Procedure Laterality Date    CORONARY ARTERY BYPASS GRAFT  02/22/2017    Coronary Artery Surgery    HERNIA REPAIR  02/22/2017    Inguinal Hernia Repair    OTHER SURGICAL HISTORY  11/17/2021    Surgery    OTHER SURGICAL HISTORY  11/17/2021    Cardioversion    OTHER SURGICAL HISTORY  12/20/2021    Mitral valve replacement    OTHER SURGICAL HISTORY  12/20/2021    Cardiac catheterization      Family History   Problem Relation Name Age of Onset    Other (Cardiac disorder) Mother      Cataracts Mother      Other (Cardiac disorder) Father      Stomach cancer Father        Social History     Socioeconomic History    Marital status:      Spouse name: Not on file    Number of children: Not on file    Years of education: Not on file    Highest education level: Not on file   Occupational History    Not on file   Tobacco Use    Smoking status: Never    Smokeless tobacco: Never   Substance and Sexual Activity    Alcohol use: Never    Drug use: Never    Sexual activity: Not on file   Other Topics  Concern    Not on file   Social History Narrative    Not on file     Social Determinants of Health     Financial Resource Strain: Not on file   Food Insecurity: Not on file   Transportation Needs: Not on file   Physical Activity: Not on file   Stress: Not on file   Social Connections: Not on file   Intimate Partner Violence: Not on file   Housing Stability: Not on file      Atorvastatin, Flecainide, Rosuvastatin, Statins-hmg-coa reductase inhibitors, Sulfa (sulfonamide antibiotics), and Procainamide   Current Outpatient Medications   Medication Sig Dispense Refill    amLODIPine (Norvasc) 10 mg tablet Take 1 tablet (10 mg) by mouth once daily. 90 tablet 2    betamethasone valerate (Valisone) 0.1 % cream Apply topically 2 times a day. 15 g 0    carvedilol (Coreg) 25 mg tablet Take 1 tablet (25 mg) by mouth 2 times a day. 180 tablet 2    cholecalciferol (Vitamin D3) 25 MCG (1000 UT) capsule Take 1 capsule (25 mcg) by mouth once daily.      cyanocobalamin (Vitamin B-12) 1,000 mcg tablet Take 100 mcg by mouth once daily.      ezetimibe (Zetia) 10 mg tablet Take 1 tablet (10 mg) by mouth once daily at bedtime. 90 tablet 2    fluticasone (Flonase) 50 mcg/actuation nasal spray Administer 1 spray into each nostril once daily.      melatonin 5 mg capsule Take 1 capsule (5 mg) by mouth as needed at bedtime.      metFORMIN (Glucophage) 500 mg tablet Take 1 tablet (500 mg) by mouth 2 times a day with meals.      pravastatin (Pravachol) 20 mg tablet Take 1 tablet (20 mg) by mouth once daily. 90 tablet 2    sertraline (Zoloft) 100 mg tablet Take 1 tablet (100 mg) by mouth once daily.      tamsulosin (Flomax) 0.4 mg 24 hr capsule Take 1 capsule (0.4 mg) by mouth once daily. 90 capsule 1    warfarin (Coumadin) 5 mg tablet Take 1 tablet (5 mg) by mouth once daily.  Tues and Thurs  - takes 1.5 tabs      dapagliflozin propanediol (Farxiga) 10 mg Take 1 tablet (10 mg) by mouth once daily. 30 tablet 11     No current  facility-administered medications for this visit.       Immunization History   Administered Date(s) Administered    Flu vaccine (IIV4), preservative free *Check age/dose* 10/08/2020    Flu vaccine, quadrivalent, high-dose, preservative free, age 65y+ (FLUZONE) 10/03/2023    Influenza, High Dose Seasonal, Preservative Free 11/30/2017, 10/16/2018, 10/17/2019    Influenza, Seasonal, Quadrivalent, Adjuvanted 11/22/2021, 11/07/2022    Influenza, Unspecified 10/01/2010, 01/01/2014, 02/01/2015, 10/01/2018, 11/01/2022    Influenza, live, intranasal, quadrivalent 11/22/2021    Influenza, seasonal, injectable 01/15/2014, 11/28/2018, 08/01/2019, 10/01/2020, 09/13/2021, 09/19/2022    Pfizer COVID-19 vaccine, Fall 2023, 12 years and older, (30mcg/0.3mL) 01/26/2024    Pfizer Gray Cap SARS-CoV-2 05/05/2022    Pfizer Purple Cap SARS-CoV-2 02/10/2021, 03/02/2021, 10/13/2021, 02/07/2022    Pneumococcal Conjugate PCV 7 1945    Pneumococcal conjugate vaccine, 13-valent (PREVNAR 13) 03/03/2017    Pneumococcal polysaccharide vaccine, 23-valent, age 2 years and older (PNEUMOVAX 23) 02/10/2014, 07/30/2019, 08/16/2021    Zoster vaccine, recombinant, adult (SHINGRIX) 06/19/2019, 08/19/2019    Zoster, Unspecified 07/01/2019, 08/31/2019        Review of Systems   Constitutional: Negative.    HENT: Negative.     Eyes: Negative.    Respiratory: Negative.     Cardiovascular: Negative.    Gastrointestinal: Negative.    Endocrine: Negative.    Genitourinary: Negative.    Musculoskeletal: Negative.    Skin: Negative.    Allergic/Immunologic: Negative.    Hematological: Negative.    Psychiatric/Behavioral: Negative.     All other systems reviewed and are negative.       Vitals:    01/29/24 1103   BP: 137/66   Pulse: 79   Temp: 36.5 °C (97.7 °F)     Vitals:    01/29/24 1103   Weight: 97.1 kg (214 lb)      Physical Exam  Constitutional:       General: He is not in acute distress.     Appearance: Normal appearance.   Neck:      Vascular: No  carotid bruit.   Cardiovascular:      Rate and Rhythm: Normal rate and regular rhythm.      Pulses: Normal pulses.      Heart sounds: Murmur heard.      No friction rub. No gallop.   Pulmonary:      Effort: Pulmonary effort is normal.      Breath sounds: Normal breath sounds. No wheezing or rales.   Musculoskeletal:      Cervical back: Neck supple.   Neurological:      Mental Status: He is alert.   Psychiatric:         Mood and Affect: Mood normal.         Thought Content: Thought content normal.     Heart-regular S1-S2 with a 1/6 systolic ejection murmur at the left sternal border.  There is also a mechanical heart sound as well as before    ASSESSMENT/PLAN: Hypertension  Diabetes mellitus type 2 stable with A1c 6.6  Hyperlipidemia stable with cholesterol 152 and LDL 86  History of vitamin B12 deficiency  Renal mass followed by urology    Plan-continue current medications noted  Follow-up with Dr. Celis for cardiology care and Dr. Harp for urology care.  Obtain PSA in February 2024 prior to urology office visit.  Check CMP and A1c and vitamin B12 4 months  Exercise regularly  Eye exam up-to-date  Recommended RSV vaccine  Follow-up with 4 months and call as needed

## 2024-01-29 NOTE — PROGRESS NOTES
Subjective     Patient ID: Mando Tay is a 78 y.o. male who presents for Follow-up:    Problem List Items Addressed This Visit    None     Past Medical History:   Diagnosis Date    Acute myocardial infarction, unspecified (CMS/HCC) 11/17/2021    Acute myocardial infarction    Epididymitis 06/02/2022    Epididymitis, right    Essential (primary) hypertension 12/08/2022    Hypertension    Obesity, unspecified 04/04/2022    Class 1 obesity with body mass index (BMI) of 31.0 to 31.9 in adult    Obesity, unspecified 06/08/2022    Class 1 obesity with body mass index (BMI) of 30.0 to 30.9 in adult    Obesity, unspecified 01/10/2022    Class 1 obesity with body mass index (BMI) of 31.0 to 31.9 in adult    Obesity, unspecified 01/31/2022    Class 1 obesity with body mass index (BMI) of 31.0 to 31.9 in adult    Other abnormal findings in urine 05/09/2022    Dark urine    Other specified disorders of kidney and ureter     Bilateral renal masses    Other specified disorders of kidney and ureter 05/09/2022    Renal mass, right    Other specified disorders of the male genital organs 05/09/2022    Swelling of right half of scrotum    Other symptoms and signs involving the musculoskeletal system 11/17/2021    Leg fatigue    Personal history of other diseases of the circulatory system 11/17/2021    History of mitral valve insufficiency    Personal history of other diseases of the respiratory system 12/09/2021    History of acute bronchitis    Personal history of other specified conditions 04/04/2022    History of shortness of breath    Personal history of other specified conditions     History of snoring    Personal history of pneumonia (recurrent) 01/31/2022    History of pneumonia    Prediabetes 06/18/2018    Borderline diabetes mellitus    Scrotal pain 05/09/2022    Acute pain in scrotum    Shortness of breath 12/09/2022    Shortness of breath on exertion    Unspecified cataract 02/22/2017    Cataract of right eye     Unspecified cataract 02/22/2017    Cataract of left eye    Unspecified osteoarthritis, unspecified site     Arthritis      Past Surgical History:   Procedure Laterality Date    CORONARY ARTERY BYPASS GRAFT  02/22/2017    Coronary Artery Surgery    HERNIA REPAIR  02/22/2017    Inguinal Hernia Repair    OTHER SURGICAL HISTORY  11/17/2021    Surgery    OTHER SURGICAL HISTORY  11/17/2021    Cardioversion    OTHER SURGICAL HISTORY  12/20/2021    Mitral valve replacement    OTHER SURGICAL HISTORY  12/20/2021    Cardiac catheterization      Family History   Problem Relation Name Age of Onset    Other (Cardiac disorder) Mother      Cataracts Mother      Other (Cardiac disorder) Father      Stomach cancer Father        Social History     Socioeconomic History    Marital status:      Spouse name: Not on file    Number of children: Not on file    Years of education: Not on file    Highest education level: Not on file   Occupational History    Not on file   Tobacco Use    Smoking status: Never    Smokeless tobacco: Never   Substance and Sexual Activity    Alcohol use: Never    Drug use: Never    Sexual activity: Not on file   Other Topics Concern    Not on file   Social History Narrative    Not on file     Social Determinants of Health     Financial Resource Strain: Not on file   Food Insecurity: Not on file   Transportation Needs: Not on file   Physical Activity: Not on file   Stress: Not on file   Social Connections: Not on file   Intimate Partner Violence: Not on file   Housing Stability: Not on file      Atorvastatin, Flecainide, Rosuvastatin, Statins-hmg-coa reductase inhibitors, Sulfa (sulfonamide antibiotics), and Procainamide   Current Outpatient Medications   Medication Sig Dispense Refill    amLODIPine (Norvasc) 10 mg tablet Take 1 tablet (10 mg) by mouth once daily. 90 tablet 2    betamethasone valerate (Valisone) 0.1 % cream Apply topically 2 times a day. 15 g 0    carvedilol (Coreg) 25 mg tablet Take 1  tablet (25 mg) by mouth 2 times a day. 180 tablet 2    cholecalciferol (Vitamin D3) 25 MCG (1000 UT) capsule Take 1 capsule (25 mcg) by mouth once daily.      cyanocobalamin (Vitamin B-12) 1,000 mcg tablet Take 100 mcg by mouth once daily.      ezetimibe (Zetia) 10 mg tablet Take 1 tablet (10 mg) by mouth once daily at bedtime. 90 tablet 2    fluticasone (Flonase) 50 mcg/actuation nasal spray Administer 1 spray into each nostril once daily.      melatonin 5 mg capsule Take 1 capsule (5 mg) by mouth as needed at bedtime.      metFORMIN (Glucophage) 500 mg tablet Take 1 tablet (500 mg) by mouth 2 times a day with meals.      pravastatin (Pravachol) 20 mg tablet Take 1 tablet (20 mg) by mouth once daily. 90 tablet 2    sertraline (Zoloft) 100 mg tablet Take 1 tablet (100 mg) by mouth once daily.      tamsulosin (Flomax) 0.4 mg 24 hr capsule Take 1 capsule (0.4 mg) by mouth once daily. 90 capsule 1    warfarin (Coumadin) 5 mg tablet Take 1 tablet (5 mg) by mouth once daily.  Tues and Thurs  - takes 1.5 tabs      dapagliflozin propanediol (Farxiga) 10 mg Take 1 tablet (10 mg) by mouth once daily. 30 tablet 11     No current facility-administered medications for this visit.       Immunization History   Administered Date(s) Administered    Flu vaccine (IIV4), preservative free *Check age/dose* 10/08/2020    Flu vaccine, quadrivalent, high-dose, preservative free, age 65y+ (FLUZONE) 10/03/2023    Influenza, High Dose Seasonal, Preservative Free 11/30/2017, 10/16/2018, 10/17/2019    Influenza, Seasonal, Quadrivalent, Adjuvanted 11/22/2021, 11/07/2022    Influenza, Unspecified 10/01/2010, 01/01/2014, 02/01/2015, 10/01/2018, 11/01/2022    Influenza, live, intranasal, quadrivalent 11/22/2021    Influenza, seasonal, injectable 01/15/2014, 11/28/2018, 08/01/2019, 10/01/2020, 09/13/2021, 09/19/2022    Pfizer COVID-19 vaccine, Fall 2023, 12 years and older, (30mcg/0.3mL) 01/26/2024    Pfizer Gray Cap SARS-CoV-2 05/05/2022    Pfizer  Purple Cap SARS-CoV-2 02/10/2021, 03/02/2021, 10/13/2021, 02/07/2022    Pneumococcal Conjugate PCV 7 1945    Pneumococcal conjugate vaccine, 13-valent (PREVNAR 13) 03/03/2017    Pneumococcal polysaccharide vaccine, 23-valent, age 2 years and older (PNEUMOVAX 23) 02/10/2014, 07/30/2019, 08/16/2021    Zoster vaccine, recombinant, adult (SHINGRIX) 06/19/2019, 08/19/2019    Zoster, Unspecified 07/01/2019, 08/31/2019        Review of Systems     Vitals:    01/29/24 1103   BP: 137/66   Pulse: 79   Temp: 36.5 °C (97.7 °F)     Vitals:    01/29/24 1103   Weight: 97.1 kg (214 lb)      Physical Exam     ASSESSMENT/PLAN:         Scribe Attestation  By signing my name below, I, Juliann Tamayo LPN, Scribe   attest that this documentation has been prepared under the direction and in the presence of Juan West MD.

## 2024-02-01 ENCOUNTER — TELEPHONE (OUTPATIENT)
Dept: CARDIOLOGY | Facility: CLINIC | Age: 79
End: 2024-02-01
Payer: MEDICARE

## 2024-02-01 DIAGNOSIS — I05.9 MITRAL VALVE DISEASE: Primary | ICD-10-CM

## 2024-02-01 RX ORDER — AMOXICILLIN 500 MG/1
2000 CAPSULE ORAL ONCE
Qty: 4 CAPSULE | Refills: 0 | Status: SHIPPED | OUTPATIENT
Start: 2024-02-01 | End: 2024-02-01

## 2024-02-01 NOTE — TELEPHONE ENCOUNTER
Pt left [facility]  stating he has a dental appointment on Monday and needs an antibiotic called in.   Script to go to [facility], [place].

## 2024-02-01 NOTE — TELEPHONE ENCOUNTER
Reached out to pharmacy and confirmed prescription was received for patient and being worked on. Also Called and Advised patient, verbalized understanding Pedro Pablo PARK

## 2024-02-01 NOTE — TELEPHONE ENCOUNTER
Patient is to see the dentist needs antibiotics prior.  Prescription for amoxicillin 2 g 30 to 60 minutes prior to procedure sent in.

## 2024-02-12 ENCOUNTER — TELEPHONE (OUTPATIENT)
Dept: UROLOGY | Facility: CLINIC | Age: 79
End: 2024-02-12
Payer: MEDICARE

## 2024-02-12 DIAGNOSIS — N28.89 RENAL MASS, LEFT: ICD-10-CM

## 2024-02-12 NOTE — TELEPHONE ENCOUNTER
Patient states that he has a ct scan scheduled for march but there were no orders for blood work prior.

## 2024-02-20 ENCOUNTER — LAB (OUTPATIENT)
Dept: LAB | Facility: LAB | Age: 79
End: 2024-02-20
Payer: MEDICARE

## 2024-02-20 DIAGNOSIS — N28.89 RENAL MASS, LEFT: ICD-10-CM

## 2024-02-20 DIAGNOSIS — Z79.01 LONG TERM (CURRENT) USE OF ANTICOAGULANTS: ICD-10-CM

## 2024-02-20 DIAGNOSIS — N13.8 BPH WITH OBSTRUCTION/LOWER URINARY TRACT SYMPTOMS: ICD-10-CM

## 2024-02-20 DIAGNOSIS — N40.1 BPH WITH OBSTRUCTION/LOWER URINARY TRACT SYMPTOMS: ICD-10-CM

## 2024-02-20 LAB
CREAT SERPL-MCNC: 1.07 MG/DL (ref 0.5–1.3)
EGFRCR SERPLBLD CKD-EPI 2021: 71 ML/MIN/1.73M*2
INR PPP: 3.2 (ref 0.9–1.1)
PROTHROMBIN TIME: 36.7 SECONDS (ref 9.8–12.8)
PSA SERPL-MCNC: 6.12 NG/ML

## 2024-02-20 PROCEDURE — 36415 COLL VENOUS BLD VENIPUNCTURE: CPT

## 2024-02-20 PROCEDURE — 84153 ASSAY OF PSA TOTAL: CPT

## 2024-02-20 PROCEDURE — 82565 ASSAY OF CREATININE: CPT

## 2024-02-20 PROCEDURE — 85610 PROTHROMBIN TIME: CPT

## 2024-02-21 ENCOUNTER — ANTICOAGULATION - WARFARIN VISIT (OUTPATIENT)
Dept: CARDIOLOGY | Facility: CLINIC | Age: 79
End: 2024-02-21
Payer: MEDICARE

## 2024-02-21 ENCOUNTER — TELEPHONE (OUTPATIENT)
Dept: PRIMARY CARE | Facility: CLINIC | Age: 79
End: 2024-02-21
Payer: MEDICARE

## 2024-02-21 NOTE — PROGRESS NOTES
Called and spoke with Mando Tay regarding INR 3.2 with goal 2.5 - 3.5.  will continue current Coumadin dosing.  INR in 4 weeks approximately 3/20/2024.    Mr Tay verbalizes understanding.

## 2024-02-21 NOTE — TELEPHONE ENCOUNTER
----- Message from Juan West MD sent at 2/21/2024  7:41 AM EST -----  PSA higher at 6.  Tell pt he needs to see his urologist

## 2024-02-22 ENCOUNTER — APPOINTMENT (OUTPATIENT)
Dept: CARDIOLOGY | Facility: CLINIC | Age: 79
End: 2024-02-22
Payer: MEDICARE

## 2024-03-04 ENCOUNTER — HOSPITAL ENCOUNTER (OUTPATIENT)
Dept: RADIOLOGY | Facility: CLINIC | Age: 79
Discharge: HOME | End: 2024-03-04
Payer: MEDICARE

## 2024-03-04 DIAGNOSIS — N28.89 OTHER SPECIFIED DISORDERS OF KIDNEY AND URETER: ICD-10-CM

## 2024-03-04 DIAGNOSIS — E27.8 OTHER SPECIFIED DISORDERS OF ADRENAL GLAND (MULTI): ICD-10-CM

## 2024-03-04 PROCEDURE — 74170 CT ABD WO CNTRST FLWD CNTRST: CPT | Mod: LT

## 2024-03-04 PROCEDURE — 74170 CT ABD WO CNTRST FLWD CNTRST: CPT | Mod: LEFT SIDE | Performed by: RADIOLOGY

## 2024-03-04 PROCEDURE — 2550000001 HC RX 255 CONTRASTS: Performed by: UROLOGY

## 2024-03-04 RX ADMIN — IOHEXOL 75 ML: 350 INJECTION, SOLUTION INTRAVENOUS at 11:42

## 2024-03-12 ENCOUNTER — OFFICE VISIT (OUTPATIENT)
Dept: UROLOGY | Facility: CLINIC | Age: 79
End: 2024-03-12
Payer: MEDICARE

## 2024-03-12 ENCOUNTER — APPOINTMENT (OUTPATIENT)
Dept: UROLOGY | Facility: CLINIC | Age: 79
End: 2024-03-12
Payer: MEDICARE

## 2024-03-12 VITALS
SYSTOLIC BLOOD PRESSURE: 168 MMHG | TEMPERATURE: 97.9 F | DIASTOLIC BLOOD PRESSURE: 83 MMHG | WEIGHT: 215 LBS | HEART RATE: 79 BPM | BODY MASS INDEX: 30.78 KG/M2 | HEIGHT: 70 IN

## 2024-03-12 DIAGNOSIS — N28.89 RENAL MASS, LEFT: ICD-10-CM

## 2024-03-12 DIAGNOSIS — N40.1 BPH WITH OBSTRUCTION/LOWER URINARY TRACT SYMPTOMS: ICD-10-CM

## 2024-03-12 DIAGNOSIS — N13.8 BPH WITH OBSTRUCTION/LOWER URINARY TRACT SYMPTOMS: ICD-10-CM

## 2024-03-12 PROCEDURE — 99214 OFFICE O/P EST MOD 30 MIN: CPT | Performed by: UROLOGY

## 2024-03-12 PROCEDURE — 1126F AMNT PAIN NOTED NONE PRSNT: CPT | Performed by: UROLOGY

## 2024-03-12 PROCEDURE — 3077F SYST BP >= 140 MM HG: CPT | Performed by: UROLOGY

## 2024-03-12 PROCEDURE — G2211 COMPLEX E/M VISIT ADD ON: HCPCS | Performed by: UROLOGY

## 2024-03-12 PROCEDURE — 1159F MED LIST DOCD IN RCRD: CPT | Performed by: UROLOGY

## 2024-03-12 PROCEDURE — 1036F TOBACCO NON-USER: CPT | Performed by: UROLOGY

## 2024-03-12 PROCEDURE — 3079F DIAST BP 80-89 MM HG: CPT | Performed by: UROLOGY

## 2024-03-12 PROCEDURE — 1160F RVW MEDS BY RX/DR IN RCRD: CPT | Performed by: UROLOGY

## 2024-03-12 RX ORDER — TAMSULOSIN HYDROCHLORIDE 0.4 MG/1
0.8 CAPSULE ORAL DAILY
Qty: 90 CAPSULE | Refills: 3 | Status: SHIPPED | OUTPATIENT
Start: 2024-03-12

## 2024-03-12 NOTE — PROGRESS NOTES
Subjective   Patient ID: Mando Tay is a 78 y.o. male who presents for CT results. Last seen 3/21/23 when I reviewed his CT scan. The left renal mass does enhance but is stable in size and appearance. We jointly made the decision to continue with observation. I will plan to repeat imaging in 1 year. There is a adrenal nodule which we will also follow. It appears stable in size as well.  We discussed his lower urinary tract symptoms. We discussed whether we should add medications or increase his tamsulosin to 0.8 mg. We jointly made the decision to observe for now. His PSA is within normal limits for age of 77. It has risen however from prior values. We will plan to followed up in 1 year.     HPI  Most recent PSA is 6.12 in February 2024, up from 4.59 one year ago.   Patient relates he is doing well. Patient was supposed to see CNP this morning. Patient has not had a biopsy previously. Patient is on tamsulosin 0.4 mg nightly. Patient says sometimes his flow is too much but actually describes frequency.     CT Kidney w/wo contrast   IMPRESSION:  1. Mild interval increase in size of left renal mass suggestive of  malignancy. This is amenable for cryoablation as clinically  warranted. Surgical/oncology/IR consult recommended..  2. Additional detailed findings as above.     Review of Systems  A 12 system review was completed and is negative with the exception of those signs and symptoms noted in the history of present illness.    Objective   Physical Exam  General: in NAD, appears stated age  Head: normocephalic, atraumatic  Respiratory: normal effort, no use of accessory muscles  Cardiovascular: no edema noted  Skin: normal turgor, no rashes  Neurologic: grossly intact, oriented to person/place/time  Psychiatric: mode and affect appropriate     Assessment/Plan   Problem List Items Addressed This Visit             ICD-10-CM    BPH with obstruction/lower urinary tract symptoms N40.1, N13.8    Relevant Medications     tamsulosin (Flomax) 0.4 mg 24 hr capsule    Other Relevant Orders    Follow Up In Urology    Prostate Specific Antigen    Renal mass, left N28.89    Relevant Orders    Follow Up In Urology     We reviewed his recent CT results. The renal lesion has grown. We discussed treatment options. The lesion is currently 2.5 cm. I recommend we move to ablation of the lesion before it is too big for this treatment, especially since this patient is not a good surgical candidate. Provide a referral to interventional radiology for consult on renal lesion ablation.   I think we can continue to watch the PSA. Patient will double his tamsulosin to 0.8 mg nightly. Refill his prescription. Order PSA for 6 months. Patient will follow-up in 6 months with results.     Follow-up in 6 months for continued management of renal lesion and elevated PSA.     Scribe Attestation  By signing my name below, I, Lucinda Montano   attest that this documentation has been prepared under the direction and in the presence of Rafael Harp MD.

## 2024-03-14 DIAGNOSIS — E78.2 MIXED HYPERLIPIDEMIA: ICD-10-CM

## 2024-03-14 RX ORDER — PRAVASTATIN SODIUM 20 MG/1
20 TABLET ORAL DAILY
Qty: 90 TABLET | Refills: 2 | Status: SHIPPED | OUTPATIENT
Start: 2024-03-14 | End: 2024-04-04 | Stop reason: SDUPTHER

## 2024-03-15 DIAGNOSIS — F32.5 MAJOR DEPRESSIVE DISORDER WITH SINGLE EPISODE, IN FULL REMISSION (CMS-HCC): Primary | ICD-10-CM

## 2024-03-15 RX ORDER — SERTRALINE HYDROCHLORIDE 100 MG/1
100 TABLET, FILM COATED ORAL DAILY
Qty: 90 TABLET | Refills: 1 | Status: SHIPPED | OUTPATIENT
Start: 2024-03-15

## 2024-03-19 ENCOUNTER — HOSPITAL ENCOUNTER (OUTPATIENT)
Dept: RADIOLOGY | Facility: HOSPITAL | Age: 79
Discharge: HOME | End: 2024-03-19
Payer: MEDICARE

## 2024-03-19 VITALS
HEART RATE: 81 BPM | TEMPERATURE: 98.2 F | SYSTOLIC BLOOD PRESSURE: 148 MMHG | DIASTOLIC BLOOD PRESSURE: 72 MMHG | OXYGEN SATURATION: 96 % | RESPIRATION RATE: 12 BRPM

## 2024-03-19 DIAGNOSIS — N28.89 RENAL MASS, LEFT: ICD-10-CM

## 2024-03-19 PROCEDURE — 99203 OFFICE O/P NEW LOW 30 MIN: CPT | Performed by: RADIOLOGY

## 2024-03-19 ASSESSMENT — ENCOUNTER SYMPTOMS
CARDIOVASCULAR NEGATIVE: 1
CONSTITUTIONAL NEGATIVE: 1
RESPIRATORY NEGATIVE: 1
GASTROINTESTINAL NEGATIVE: 1

## 2024-03-19 NOTE — H&P
History Of Present Illness  Mando Tay is a 78 y.o. male presenting with incidental left kidney mass found on a CT of the abdomen and pelvis performed for another indication. This is an enhancing lesion with a high probability for renal cell carcinoma. The patient has been following with Dr. Harp for about 2 years. Now showing interval growth from 2 to 2.5 cm and would like to treat before enlarging further. No hematuria, flank mass, flank pain or other related symptoms. He does have medical co morbidities including prosthetic mitral valve, history of MI, HTN.     Past Medical History  Past Medical History:   Diagnosis Date    Acute myocardial infarction, unspecified (CMS/HCC) 11/17/2021    Acute myocardial infarction    Epididymitis 06/02/2022    Epididymitis, right    Essential (primary) hypertension 12/08/2022    Hypertension    Obesity, unspecified 04/04/2022    Class 1 obesity with body mass index (BMI) of 31.0 to 31.9 in adult    Obesity, unspecified 06/08/2022    Class 1 obesity with body mass index (BMI) of 30.0 to 30.9 in adult    Obesity, unspecified 01/10/2022    Class 1 obesity with body mass index (BMI) of 31.0 to 31.9 in adult    Obesity, unspecified 01/31/2022    Class 1 obesity with body mass index (BMI) of 31.0 to 31.9 in adult    Other abnormal findings in urine 05/09/2022    Dark urine    Other specified disorders of kidney and ureter     Bilateral renal masses    Other specified disorders of kidney and ureter 05/09/2022    Renal mass, right    Other specified disorders of the male genital organs 05/09/2022    Swelling of right half of scrotum    Other symptoms and signs involving the musculoskeletal system 11/17/2021    Leg fatigue    Personal history of other diseases of the circulatory system 11/17/2021    History of mitral valve insufficiency    Personal history of other diseases of the respiratory system 12/09/2021    History of acute bronchitis    Personal history of other specified  conditions 04/04/2022    History of shortness of breath    Personal history of other specified conditions     History of snoring    Personal history of pneumonia (recurrent) 01/31/2022    History of pneumonia    Prediabetes 06/18/2018    Borderline diabetes mellitus    Scrotal pain 05/09/2022    Acute pain in scrotum    Shortness of breath 12/09/2022    Shortness of breath on exertion    Unspecified cataract 02/22/2017    Cataract of right eye    Unspecified cataract 02/22/2017    Cataract of left eye    Unspecified osteoarthritis, unspecified site     Arthritis       Surgical History  Past Surgical History:   Procedure Laterality Date    CORONARY ARTERY BYPASS GRAFT  02/22/2017    Coronary Artery Surgery    HERNIA REPAIR  02/22/2017    Inguinal Hernia Repair    OTHER SURGICAL HISTORY  11/17/2021    Surgery    OTHER SURGICAL HISTORY  11/17/2021    Cardioversion    OTHER SURGICAL HISTORY  12/20/2021    Mitral valve replacement    OTHER SURGICAL HISTORY  12/20/2021    Cardiac catheterization        Social History  He reports that he has never smoked. He has never used smokeless tobacco. He reports that he does not drink alcohol and does not use drugs.    Family History  Family History   Problem Relation Name Age of Onset    Other (Cardiac disorder) Mother      Cataracts Mother      Other (Cardiac disorder) Father      Stomach cancer Father          Allergies  Atorvastatin, Flecainide, Rosuvastatin, Statins-hmg-coa reductase inhibitors, Sulfa (sulfonamide antibiotics), and Procainamide    Review of Systems   Constitutional: Negative.    HENT: Negative.     Respiratory: Negative.     Cardiovascular: Negative.    Gastrointestinal: Negative.         Physical Exam  Awake and alert, oriented  No acute distress  Regular rate, rhythm, mechanical mitral valve  Breathing non-labored     Last Recorded Vitals  Blood pressure 148/72, pulse 81, temperature 36.8 °C (98.2 °F), temperature source Temporal, resp. rate 12, SpO2 96  %.    Relevant Results         Assessment/Plan   Active Problems:  There are no active Hospital Problems.      I had a discussion with the patient regarding the nature of image guided cryoablation and how it may be used to treat patients with small kidney tumors. I discussed the likely outcomes of the procedure as well as more remote risks. These include but are not limited to the risk of bleeding, including hematuria, which could require a blood transfusion, inpatient (including ICU) admission, or additional procedures or surgeries for correction. There is a risk of kidney damage related to potential bleeding and from damage to the collecting system which could impair function or lead to nephrectomy or the remote risk of death. We discussed the possibility of damage to adjacent structures.     During the procedure, a biopsy sample will be obtained to further characterize the tumor. I discussed the specific risks of biopsy, including the risk of tract seeding.     I discussed the need for clinical and imaging follow up with the urologist to evaluate for residual or recurrent disease which may require additional procedures or surgeries for complete treatment.     We discussed the expected post procedure course and the plan for discharge. The patient demonstrates an understanding of the procedure and its risks and benefits, asking appropriate questions.     We will proceed with image guided biopsy and ablation of the LEFT kidney mass under general anesthesia.              I spent 40 minutes in the professional and overall care of this patient.      Froilan Reina MD

## 2024-03-20 ENCOUNTER — LAB (OUTPATIENT)
Dept: LAB | Facility: LAB | Age: 79
End: 2024-03-20
Payer: MEDICARE

## 2024-03-20 DIAGNOSIS — Z79.01 LONG TERM (CURRENT) USE OF ANTICOAGULANTS: ICD-10-CM

## 2024-03-20 LAB
INR PPP: 2.8 (ref 0.9–1.1)
PROTHROMBIN TIME: 32.4 SECONDS (ref 9.8–12.8)

## 2024-03-20 PROCEDURE — 85610 PROTHROMBIN TIME: CPT

## 2024-03-20 PROCEDURE — 36415 COLL VENOUS BLD VENIPUNCTURE: CPT

## 2024-03-21 ENCOUNTER — ANTICOAGULATION - WARFARIN VISIT (OUTPATIENT)
Dept: CARDIOLOGY | Facility: CLINIC | Age: 79
End: 2024-03-21
Payer: MEDICARE

## 2024-03-21 NOTE — PROGRESS NOTES
Called Mando Tay regarding INR 2.8 with goal 2.5- 3.5.  No change in coumadin dosing.  Repeat INR 4 weeks approximately 4/17/2024.    No answer, left message to call office for orders.

## 2024-03-21 NOTE — PROGRESS NOTES
Pt returned call. Advised of Shirley Jarquin  message to continue same dose and recheck INR approx 4/17/24. Pt agreeable with plan and verbalized good understanding.

## 2024-03-22 ENCOUNTER — TELEPHONE (OUTPATIENT)
Dept: CARDIOLOGY | Facility: CLINIC | Age: 79
End: 2024-03-22
Payer: MEDICARE

## 2024-03-22 NOTE — TELEPHONE ENCOUNTER
Scott [facility] [phone] option 6 calling stating that pt will be having a renal ablation (not scheduled at this time) Pt has a mechanical valve and will need Instructions for Coumadin. Please advise

## 2024-03-25 ENCOUNTER — APPOINTMENT (OUTPATIENT)
Dept: DERMATOLOGY | Facility: CLINIC | Age: 79
End: 2024-03-25
Payer: MEDICARE

## 2024-03-29 ENCOUNTER — PREP FOR PROCEDURE (OUTPATIENT)
Dept: RADIOLOGY | Facility: HOSPITAL | Age: 79
End: 2024-03-29
Payer: MEDICARE

## 2024-03-29 DIAGNOSIS — N28.89 RENAL MASS, LEFT: Primary | ICD-10-CM

## 2024-03-29 NOTE — TELEPHONE ENCOUNTER
Called patient who was notified a date is needed to process pre procedure Warfarin orders.  He was instructed to notify Dr. Zimmerman.  He verbalized understanding.  Called Manar who was notified of same.  She verbalized understanding and states there is a date set but it probably is not a good date.  Scott informed due to mechanical valve Lovenox bridging may be needed.  She states she will call office back with a date.  Note to be sent urgent to Dr. Ck Celis, F.A.C.C. for review once Scott calls back with procedure date.  Tiera Francis, CMA

## 2024-04-01 NOTE — TELEPHONE ENCOUNTER
Advised pt verbalized understanding. Pt to be seen in office later this week with Dr. Ck Celis MD, FACC. Eliu

## 2024-04-04 ENCOUNTER — OFFICE VISIT (OUTPATIENT)
Dept: CARDIOLOGY | Facility: CLINIC | Age: 79
End: 2024-04-04
Payer: MEDICARE

## 2024-04-04 VITALS
DIASTOLIC BLOOD PRESSURE: 70 MMHG | BODY MASS INDEX: 31.05 KG/M2 | SYSTOLIC BLOOD PRESSURE: 148 MMHG | WEIGHT: 216.4 LBS | HEART RATE: 81 BPM

## 2024-04-04 DIAGNOSIS — I05.9 MITRAL VALVE DISEASE: ICD-10-CM

## 2024-04-04 DIAGNOSIS — Z79.01 ANTICOAGULANT LONG-TERM USE: ICD-10-CM

## 2024-04-04 DIAGNOSIS — Z79.899 MEDICATION DOSE CHANGED: ICD-10-CM

## 2024-04-04 DIAGNOSIS — I10 PRIMARY HYPERTENSION: ICD-10-CM

## 2024-04-04 DIAGNOSIS — R06.02 SHORTNESS OF BREATH: ICD-10-CM

## 2024-04-04 DIAGNOSIS — Z01.818 PRE-OPERATIVE CLEARANCE: ICD-10-CM

## 2024-04-04 PROCEDURE — 3077F SYST BP >= 140 MM HG: CPT | Performed by: INTERNAL MEDICINE

## 2024-04-04 PROCEDURE — 1160F RVW MEDS BY RX/DR IN RCRD: CPT | Performed by: INTERNAL MEDICINE

## 2024-04-04 PROCEDURE — 99214 OFFICE O/P EST MOD 30 MIN: CPT | Performed by: INTERNAL MEDICINE

## 2024-04-04 PROCEDURE — 3078F DIAST BP <80 MM HG: CPT | Performed by: INTERNAL MEDICINE

## 2024-04-04 PROCEDURE — 1159F MED LIST DOCD IN RCRD: CPT | Performed by: INTERNAL MEDICINE

## 2024-04-04 RX ORDER — EZETIMIBE 10 MG/1
10 TABLET ORAL NIGHTLY
Qty: 90 TABLET | Refills: 1 | Status: SHIPPED | OUTPATIENT
Start: 2024-04-04

## 2024-04-04 RX ORDER — VALSARTAN 160 MG/1
160 TABLET ORAL 2 TIMES DAILY
Qty: 180 TABLET | Refills: 1 | Status: SHIPPED | OUTPATIENT
Start: 2024-04-04 | End: 2025-04-04

## 2024-04-04 RX ORDER — PRAVASTATIN SODIUM 20 MG/1
20 TABLET ORAL DAILY
Qty: 90 TABLET | Refills: 1 | Status: SHIPPED | OUTPATIENT
Start: 2024-04-04

## 2024-04-04 RX ORDER — AMLODIPINE BESYLATE 10 MG/1
10 TABLET ORAL DAILY
Qty: 90 TABLET | Refills: 1 | Status: SHIPPED | OUTPATIENT
Start: 2024-04-04

## 2024-04-04 RX ORDER — CARVEDILOL 25 MG/1
25 TABLET ORAL 2 TIMES DAILY
Qty: 180 TABLET | Refills: 1 | Status: SHIPPED | OUTPATIENT
Start: 2024-04-04

## 2024-04-04 RX ORDER — ENOXAPARIN SODIUM 100 MG/ML
100 INJECTION SUBCUTANEOUS EVERY 12 HOURS
Qty: 10 EACH | Refills: 1 | Status: SHIPPED | OUTPATIENT
Start: 2024-04-04 | End: 2024-05-15 | Stop reason: ALTCHOICE

## 2024-04-04 RX ORDER — WARFARIN SODIUM 5 MG/1
TABLET ORAL
Qty: 110 TABLET | Refills: 1 | Status: SHIPPED | OUTPATIENT
Start: 2024-04-04

## 2024-04-04 NOTE — PATIENT INSTRUCTIONS
Take your last dose of warfarin on 4/19/2024.  No Warfarin 4/20/24 and 4/21/24.    Begin Lovenox 100 mg. Injections twice a day, 12 hours apart-10 AM and 10 PM on 4/22/2024.  Your last dose of Lovenox 100 mg will be at 10 AM dose on 4/23/2024 no Lovenox the night before your procedure, only the morning dose.   If surgery feels it is safe from bleeding stand point, resume your Warfarin same day as procedure.  Take your regular dose of Warfarin.  Begin Lovenox 100 mg injection the day after surgery at 10 a.m. and 10 p.m.  You will take this along with your Warfarin.  4.  You will need INRs daily starting 4/26/24 until INR is greater than 1.9 and then can discontinue Lovenox.  Warfarin will continue per your routine dosing

## 2024-04-04 NOTE — PROGRESS NOTES
Patient:  Mando Tay  YOB: 1945  MRN: [id number]       Impression/Plan:     Diagnoses and all orders for this visit:  Medication dose changed  -     Follow Up In Cardiology  Shortness of breath  -     He is functional class II for the most part shortness of breath.  Primarily diastolic dysfunction related with suboptimal control blood pressure.  Will repeat echocardiogram to assure LV function remains stable and he is not developing any pulmonary hypertension particularly in anticipation of surgery.  Will plan to continue him on valsartan 160 twice daily he is to decrease his sodium intake.  -     Transthoracic Echo (TTE) Complete; Future    Pre-operative clearance  -     For percutaneous embolization of a renal mass he is a appropriate candidate.  He has no resting dyspnea he has no PND or orthopnea.  As long as echo shows no change in LV function would proceed.  -      He has a mechanical mitral valve and needs Lovenox bridging and this regimen has been documented in the chart and reviewed with the patient.  Last dose of warfarin will be 19 April with no warfarin the next 2 days and then he will begin Lovenox.  If hemostasis should postop he will begin warfarin the evening of surgery and Lovenox the following day.    Primary hypertension  -    Avoid processed food continue valsartan as noted below as well as amlodipine and carvedilol.  -     valsartan (Diovan) 160 mg tablet; Take 1 tablet (160 mg) by mouth 2 times a day.  -     amLODIPine (Norvasc) 10 mg tablet; Take 1 tablet (10 mg) by mouth once daily.  -     carvedilol (Coreg) 25 mg tablet; Take 1 tablet (25 mg) by mouth 2 times a day.      Mitral valve disease  -     Follow Up In Cardiology; Future  -     Transthoracic Echo (TTE) Complete; Future  -     ezetimibe (Zetia) 10 mg tablet; Take 1 tablet (10 mg) by mouth once daily at bedtime.  -     pravastatin (Pravachol) 20 mg tablet; Take 1 tablet (20 mg) by mouth once daily.  Anticoagulant  long-term use  -     Lovenox bridge discussed in detail he has no active bleeding  -     warfarin (Coumadin) 5 mg tablet; As directed to maintain therapeutic INR.  Number of tablets equals 90 day supply.  (Taking 7.5 mg Tues and Thurs, 5 mg all other days of the week)      Chief Complaint/Active Symptoms:       Mando Tay is a 79 y.o. male who presents with paroxysmal atrial flutter, previous mechanical mitral valve replacement for mitral valve prolapse and endocarditis in 1998, sick sinus syndrome with permanent pacemaker.  Coronary angiography at that time with trivial irregularities circumflex only.  He has longstanding hypertension and diabetes.  From medical standpoint also with history of restrictive lung disease, T-cell lymphoma diagnosed in 2015.  Does have moderately severe restrictive lung disease     FALL 2022 he had symptoms of shortness of breath and CHF felt related to atrial flutter. He was put on flecainide cardioverted initially felt better but then further deteriorated had episodes of pneumonia some unexplained shortness of breath with eventually was felt related to flecainide.. He felt better off the flecainide which was discontinued in April. There is a 5-10% incidence of sensation of dyspnea associated with flecainide. Then became ill again with orchiditis. Subsequent CT scan demonstrated a right renal cyst and a left renal mass. He has since seen urology who feels it very likely could be malignancy but because of its size metastatic progression relatively unlikely and and serial imaging is planned.     Seen in pulmonary medicine office 10/17/2023 at which time the moderately severe restrictive lung disease described.  Recommended he follow-up with cardiology for control of A-fib and hypertension though there was no evidence of A-fib at that office visit but blood pressure was about 150 systolic at that time    He is to undergo renal ablation and I did receive information from urology that  they would need to stop warfarin and certainly he absolutely needs Lovenox bridge because of his mechanical valve but as of now no clear timeframe    Now tells me that timeframe for his renal mass ablation is to be April 24.  He has no potation's or chest pain.  He does not believe he is had recurrent atrial flutter.  Uses his CPAP regularly.  He sleeps well.  He does have shortness of breath on exertion.  Of note he is significantly hypertensive as well.  He notes no particular lower extremity edema slightly mild.  No dizziness, lightheadedness or syncope.               Review of Systems: Unremarkable except as noted above    Meds     Current Outpatient Medications   Medication Instructions    amLODIPine (NORVASC) 10 mg, oral, Daily    betamethasone valerate (Valisone) 0.1 % cream Topical, 2 times daily    carvedilol (COREG) 25 mg, oral, 2 times daily    cholecalciferol (VITAMIN D3) 25 mcg, oral, Daily    cyanocobalamin (VITAMIN B-12) 100 mcg, oral, Daily    dapagliflozin propanediol (FARXIGA) 10 mg, oral, Daily    ezetimibe (ZETIA) 10 mg, oral, Nightly    fluticasone (Flonase) 50 mcg/actuation nasal spray 1 spray, Each Nostril, Daily    melatonin 5 mg capsule 1 capsule, oral, Nightly PRN    metFORMIN (Glucophage) 500 mg tablet 1 tablet, oral, 2 times daily with meals    pravastatin (PRAVACHOL) 20 mg, oral, Daily    sertraline (ZOLOFT) 100 mg, oral, Daily    tamsulosin (FLOMAX) 0.8 mg, oral, Daily    warfarin (COUMADIN) 5 mg, oral, Daily,  Tues and Thurs  - takes 1.5 tabs        Allergies     Allergies   Allergen Reactions    Atorvastatin Myalgia    Flecainide Unknown    Rosuvastatin Myalgia    Statins-Hmg-Coa Reductase Inhibitors Unknown     Statins do not work    Sulfa (Sulfonamide Antibiotics) Unknown    Procainamide Rash         Annotated Problems     Specialty Problems          Cardiology Problems    History of mitral valve replacement     1998 [employer] mechanical mitral valve replacement         Presence of  cardiac pacemaker      2/18/15 generator change to Med[employer]onic Adapta L       10/04 Generator change to Medtronic Bier DR [id number]      1998 first device (pacesetter) placed due to CHB after MVR surgery.         Essential hypertension    Mitral valve disease     · 12/7/2021 echo LVEF 60%. LVH. RVSP 44 mm. 2+ AI unchanged. Normal function      mechanical mitral valve       History of mitral valve prolapse with mitral valve replacement mechanical [employer]        valve 1998 secondary to severe MR and endocarditis   History of mitral valve prolapse with mitral valve replacement mechanical [employer]      valve 1998 secondary to severe MR and endocarditis      Late 1997 MI- Felt due to emboli from mitral valve vegetation.         Atypical atrial flutter (CMS/HCC)    Anticoagulant long-term use    Hyperlipidemia      · 1997 trivial coronary irreg in cx. normal LV, severe MR w/subsequent MVR.          Paroxysmal atrial fibrillation (CMS/HCC)    Paroxysmal atrial flutter (CMS/HCC)    Shortness of breath on exertion    Sick sinus syndrome (CMS/HCC)    Chronic diastolic congestive heart failure, NYHA class 2 (CMS/HCC)        Problem List     Patient Active Problem List    Diagnosis Date Noted    Chronic diastolic congestive heart failure, NYHA class 2 (CMS/HCC) 10/30/2023    Medication dose changed 10/30/2023    Adrenal nodule (CMS/HCC) 10/01/2023    Renal mass, left 10/01/2023    Arthritis 10/01/2023    Atypical depressive disorder 10/01/2023    Bilateral dry eyes 10/01/2023    BPPV (benign paroxysmal positional vertigo) 10/01/2023    Combined form of age-related cataract, both eyes 10/01/2023    Cyst of testis 10/01/2023    Dysphagia 10/01/2023    Edema 10/01/2023    Fatigue 10/01/2023    Hydrocele, bilateral 10/01/2023    Hyperlipidemia 10/01/2023    Hyperopia with presbyopia 10/01/2023    MGD (meibomian gland disease) 10/01/2023    Ocular migraine 10/01/2023    Orchitis and epididymitis 10/01/2023    Paroxysmal atrial  fibrillation (CMS/HCC) 10/01/2023    Retinal scar of right eye 10/01/2023    Shortness of breath on exertion 10/01/2023    Sick sinus syndrome (CMS/HCC) 10/01/2023    Superficial basal cell carcinoma of skin of left shoulder 10/01/2023    Vestibulopathy 10/01/2023    Vitamin B 12 deficiency 10/01/2023    Vitamin D deficiency 10/01/2023    Hypoxia 10/01/2023    Anticoagulant long-term use 10/01/2023    Bilateral sensorineural hearing loss 10/01/2023    Chronic nasal congestion 10/01/2023    Class 1 obesity with body mass index (BMI) of 32.0 to 32.9 in adult 10/01/2023    Degenerative cervical spinal stenosis 10/01/2023    Diabetes mellitus treated with oral medication (CMS/HCC) 10/01/2023    High risk medication use 10/01/2023    History of snoring 10/01/2023    Hypertrophy of inferior nasal turbinate 10/01/2023    Paroxysmal atrial flutter (CMS/HCC) 10/01/2023    Tinnitus 10/01/2023    Agent orange exposure 08/21/2023    Atypical atrial flutter (CMS/HCC) 08/21/2023    BPH with obstruction/lower urinary tract symptoms 08/21/2023    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    Erectile dysfunction 08/21/2023    Major depressive disorder with single episode, in full remission (CMS/HCC) 08/21/2023    Acute bronchitis due to other specified organisms 08/21/2023    Hemangioma of skin and subcutaneous tissue 06/20/2023    Actinic keratosis 06/20/2023    Other seborrheic keratosis 06/20/2023    Melanocytic nevi of scalp and neck 06/20/2023    Melanocytic nevi of trunk 06/20/2023    Melanocytic nevi of unspecified lower limb, including hip 06/20/2023    Melanocytic nevi of unspecified part of face 06/20/2023    Melanocytic nevi of unspecified upper limb, including shoulder 06/20/2023    Neoplasm of uncertain behavior of skin 06/20/2023    Other hypertrophic disorders of the skin 06/20/2023    Other melanin hyperpigmentation 06/20/2023    Personal history of other malignant neoplasm of skin 06/20/2023    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    Viral wart, unspecified 06/20/2023    Lumbar stenosis with neurogenic claudication 03/17/2020    Myalgia, unspecified site 09/03/2019    Radial styloid tenosynovitis 06/13/2019    RAMSES (obstructive sleep apnea) 02/27/2018    Essential hypertension 02/27/2018    Mitral valve disease 02/27/2018    Presence of cardiac pacemaker 01/24/2018    History of mitral valve replacement 01/24/2018    Cervical spondylosis without myelopathy 02/02/2016    Chondromalacia of patella 01/19/2016    Primary localized osteoarthrosis of shoulder region 01/19/2016    Adhesive capsulitis of shoulder 01/19/2016       Objective:     Vitals:    04/04/24 1315 04/04/24 1320   BP: 154/70 148/70   BP Location: Left arm Left arm   Patient Position: Sitting Sitting   Pulse: 81    Weight: 98.2 kg (216 lb 6.4 oz)       Wt Readings from Last 4 Encounters:   04/04/24 98.2 kg (216 lb 6.4 oz)   03/12/24 97.5 kg (215 lb)   01/29/24 97.1 kg (214 lb)   10/31/23 94.8 kg (209 lb)           LAB:     Lab Results   Component Value Date    WBC 4.7 06/02/2023    HGB 13.3 (L) 06/02/2023    HCT 39.7 (L) 06/02/2023     (L) 06/02/2023    CHOL 152 01/23/2024    TRIG 75 01/23/2024    HDL 50.9 01/23/2024    ALT 16 01/23/2024    AST 18 01/23/2024     01/23/2024    K 4.0 01/23/2024     01/23/2024    CREATININE 1.07 02/20/2024    BUN 22 01/23/2024    CO2 32 01/23/2024    TSH 1.34 04/04/2022    PSA 4.59 (H) 02/01/2023    INR 2.8 (H) 03/20/2024    HGBA1C 6.6 (H) 01/23/2024       Diagnostic Studies:     No results found.      Radiology:     No orders to display       Physical Exam     General Appearance: alert and oriented to person, place and time, in no acute distress  Cardiovascular: normal rate, regular rhythm, normal S1 and S2, 1/6 MR murmur, no rubs, clicks, or gallops,  no JVD very crisp mitral valve mechanical sounds  Pulmonary/Chest: clear to auscultation bilaterally- no wheezes, rales or rhonchi, normal air  movement, no respiratory distress  Abdomen: soft, non-tender, non-distended, normal bowel sounds, no masses   Extremities: no cyanosis, clubbing.  Trivial edema  Skin: warm and dry, no rash or erythema  Eyes: EOMI  Neck: supple and non-tender without mass, no thyromegaly   Neurological: alert, oriented, normal speech, no focal findings or movement disorder noted  Vascular: 2+ pulses

## 2024-04-10 ENCOUNTER — LAB (OUTPATIENT)
Dept: LAB | Facility: LAB | Age: 79
End: 2024-04-10
Payer: MEDICARE

## 2024-04-10 ENCOUNTER — ANTICOAGULATION - WARFARIN VISIT (OUTPATIENT)
Dept: CARDIOLOGY | Facility: CLINIC | Age: 79
End: 2024-04-10

## 2024-04-10 ENCOUNTER — PRE-ADMISSION TESTING (OUTPATIENT)
Dept: PREADMISSION TESTING | Facility: HOSPITAL | Age: 79
End: 2024-04-10
Payer: MEDICARE

## 2024-04-10 VITALS
BODY MASS INDEX: 31.5 KG/M2 | OXYGEN SATURATION: 95 % | TEMPERATURE: 97.7 F | RESPIRATION RATE: 18 BRPM | HEART RATE: 80 BPM | HEIGHT: 70 IN | DIASTOLIC BLOOD PRESSURE: 73 MMHG | SYSTOLIC BLOOD PRESSURE: 156 MMHG | WEIGHT: 220.02 LBS

## 2024-04-10 DIAGNOSIS — Z01.818 PRE-OP TESTING: Primary | ICD-10-CM

## 2024-04-10 DIAGNOSIS — Z79.01 CURRENT USE OF LONG TERM ANTICOAGULATION: ICD-10-CM

## 2024-04-10 DIAGNOSIS — Z01.818 PRE-OP TESTING: ICD-10-CM

## 2024-04-10 LAB
ANION GAP SERPL CALC-SCNC: 11 MMOL/L (ref 10–20)
BUN SERPL-MCNC: 23 MG/DL (ref 6–23)
CALCIUM SERPL-MCNC: 9.1 MG/DL (ref 8.6–10.3)
CHLORIDE SERPL-SCNC: 101 MMOL/L (ref 98–107)
CO2 SERPL-SCNC: 31 MMOL/L (ref 21–32)
CREAT SERPL-MCNC: 1.14 MG/DL (ref 0.5–1.3)
EGFRCR SERPLBLD CKD-EPI 2021: 65 ML/MIN/1.73M*2
ERYTHROCYTE [DISTWIDTH] IN BLOOD BY AUTOMATED COUNT: 13.8 % (ref 11.5–14.5)
GLUCOSE SERPL-MCNC: 134 MG/DL (ref 74–99)
HCT VFR BLD AUTO: 41.2 % (ref 41–52)
HGB BLD-MCNC: 13.6 G/DL (ref 13.5–17.5)
INR PPP: 3.6 (ref 0.9–1.1)
MCH RBC QN AUTO: 31.5 PG (ref 26–34)
MCHC RBC AUTO-ENTMCNC: 33 G/DL (ref 32–36)
MCV RBC AUTO: 95 FL (ref 80–100)
NRBC BLD-RTO: 0 /100 WBCS (ref 0–0)
PLATELET # BLD AUTO: 142 X10*3/UL (ref 150–450)
POTASSIUM SERPL-SCNC: 4.1 MMOL/L (ref 3.5–5.3)
PROTHROMBIN TIME: 40.9 SECONDS (ref 9.8–12.8)
RBC # BLD AUTO: 4.32 X10*6/UL (ref 4.5–5.9)
SODIUM SERPL-SCNC: 139 MMOL/L (ref 136–145)
WBC # BLD AUTO: 5.5 X10*3/UL (ref 4.4–11.3)

## 2024-04-10 PROCEDURE — 85027 COMPLETE CBC AUTOMATED: CPT

## 2024-04-10 PROCEDURE — 80048 BASIC METABOLIC PNL TOTAL CA: CPT

## 2024-04-10 PROCEDURE — 93005 ELECTROCARDIOGRAM TRACING: CPT

## 2024-04-10 PROCEDURE — 85610 PROTHROMBIN TIME: CPT

## 2024-04-10 PROCEDURE — 36415 COLL VENOUS BLD VENIPUNCTURE: CPT

## 2024-04-10 ASSESSMENT — DUKE ACTIVITY SCORE INDEX (DASI)
CAN YOU WALK A BLOCK OR TWO ON LEVEL GROUND: NO
CAN YOU DO LIGHT WORK AROUND THE HOUSE LIKE DUSTING OR WASHING DISHES: YES
CAN YOU PARTICIPATE IN MODERATE RECREATIONAL ACTIVITIES LIKE GOLF, BOWLING, DANCING, DOUBLES TENNIS OR THROWING A BASEBALL OR FOOTBALL: NO
TOTAL_SCORE: 20.7
DASI METS SCORE: 5.3
CAN YOU PARTICIPATE IN STRENOUS SPORTS LIKE SWIMMING, SINGLES TENNIS, FOOTBALL, BASKETBALL, OR SKIING: NO
CAN YOU TAKE CARE OF YOURSELF (EAT, DRESS, BATHE, OR USE TOILET): YES
CAN YOU DO HEAVY WORK AROUND THE HOUSE LIKE SCRUBBING FLOORS OR LIFTING AND MOVING HEAVY FURNITURE: NO
CAN YOU WALK INDOORS, SUCH AS AROUND YOUR HOUSE: YES
CAN YOU DO MODERATE WORK AROUND THE HOUSE LIKE VACUUMING, SWEEPING FLOORS OR CARRYING GROCERIES: YES
CAN YOU DO YARD WORK LIKE RAKING LEAVES, WEEDING OR PUSHING A MOWER: YES
CAN YOU RUN A SHORT DISTANCE: NO
CAN YOU HAVE SEXUAL RELATIONS: NO
CAN YOU CLIMB A FLIGHT OF STAIRS OR WALK UP A HILL: YES

## 2024-04-10 ASSESSMENT — PAIN SCALES - GENERAL: PAINLEVEL_OUTOF10: 0 - NO PAIN

## 2024-04-10 ASSESSMENT — LIFESTYLE VARIABLES: SMOKING_STATUS: NONSMOKER

## 2024-04-10 ASSESSMENT — ACTIVITIES OF DAILY LIVING (ADL): ADL_SCORE: 0

## 2024-04-10 ASSESSMENT — CHADS2 SCORE
HYPERTENSION: YES
PRIOR STROKE OR TIA OR THROMBOEMBOLISM: NO
CHADS2 SCORE: 4
CHF: YES
AGE GREATER THAN OR EQUAL TO 75: YES
DIABETES: YES

## 2024-04-10 ASSESSMENT — PAIN - FUNCTIONAL ASSESSMENT: PAIN_FUNCTIONAL_ASSESSMENT: 0-10

## 2024-04-10 NOTE — PROGRESS NOTES
Called and spoke with Mando Tay regarding INR 3.6.  He denies any changes to his diet or medications.  Change Coumadin to 2.5 mg today, Coumadin 7.5 Tue and Coumadin 5 mg all other days.  Repeat INR in 1 week 4/17/2024.  Mr. Tineocynthia verbalizes understanding.

## 2024-04-10 NOTE — PREPROCEDURE INSTRUCTIONS
Medication List            Accurate as of April 10, 2024 10:01 AM. Always use your most recent med list.                amLODIPine 10 mg tablet  Commonly known as: Norvasc  Take 1 tablet (10 mg) by mouth once daily.  Medication Adjustments for Surgery: Take morning of surgery with sip of water, no other fluids     betamethasone valerate 0.1 % cream  Commonly known as: Valisone  Apply topically 2 times a day.  Medication Adjustments for Surgery: Continue until night before surgery     carvedilol 25 mg tablet  Commonly known as: Coreg  Take 1 tablet (25 mg) by mouth 2 times a day.  Medication Adjustments for Surgery: Take morning of surgery with sip of water, no other fluids     cyanocobalamin 1,000 mcg tablet  Commonly known as: Vitamin B-12  Medication Adjustments for Surgery: Stop 7 days before surgery     enoxaparin 100 mg/mL syringe  Commonly known as: Lovenox  Inject 1 mL (100 mg) under the skin every 12 hours.  Medication Adjustments for Surgery: Other (Comment)  Notes to patient: As instructed  Coordinate with prescribing provider for further instructions on this medications prior to surgery.     ezetimibe 10 mg tablet  Commonly known as: Zetia  Take 1 tablet (10 mg) by mouth once daily at bedtime.  Medication Adjustments for Surgery: Stop 1 day before surgery     fluticasone 50 mcg/actuation nasal spray  Commonly known as: Flonase  Medication Adjustments for Surgery: Other (Comment)  Notes to patient: May take the morning of surgery if this medication is prescribed to take in the mornings     melatonin 5 mg capsule  Medication Adjustments for Surgery: Continue until night before surgery     metFORMIN 500 mg tablet  Commonly known as: Glucophage  Medication Adjustments for Surgery: Other (Comment)  Notes to patient: HOLD any evening dose the night before the day of surgery  HOLD the day of surgery     pravastatin 20 mg tablet  Commonly known as: Pravachol  Take 1 tablet (20 mg) by mouth once  daily.  Medication Adjustments for Surgery: Other (Comment)  Notes to patient: May take the morning of surgery if this medication is prescribed to take in the mornings     sertraline 100 mg tablet  Commonly known as: Zoloft  Take 1 tablet (100 mg) by mouth once daily.  Medication Adjustments for Surgery: Take morning of surgery with sip of water, no other fluids     tamsulosin 0.4 mg 24 hr capsule  Commonly known as: Flomax  Take 2 capsules (0.8 mg) by mouth once daily.  Medication Adjustments for Surgery: Take morning of surgery with sip of water, no other fluids     valsartan 160 mg tablet  Commonly known as: Diovan  Take 1 tablet (160 mg) by mouth 2 times a day.  Medication Adjustments for Surgery: Other (Comment)  Notes to patient: Not taking     Vitamin D3 25 MCG (1000 UT) capsule  Generic drug: cholecalciferol  Medication Adjustments for Surgery: Stop 7 days before surgery     warfarin 5 mg tablet  Commonly known as: Coumadin  Take as directed by the anticoagulation clinic. If you are unsure how to take this medication, talk to your nurse or doctor.  Original instructions: As directed to maintain therapeutic INR.  Number of tablets equals 90 day supply.  (Taking 7.5 mg Tues and Thurs, 5 mg all other days of the week)  Medication Adjustments for Surgery: Other (Comment)  Notes to patient: Coordinate with prescribing provider for further instructions on this medications prior to surgery.                The medication bridging plan has been discussed with the surgeon and the patient has been informed of the plan.        Home Preoperative Antibacterial Shower with Chlorhexidine gluconate (CHG)     What is a home preoperative antibacterial shower?  This shower is a way of cleaning the skin with a germ killing solution before surgery. The solution contains chlorhexidine gluconate, commonly known as CHG. CHG is a skin cleanser with germ killing ability. Let your doctor know if you are allergic to  chlorhexidine.    Why do I need to take a preoperative antibacterial shower?  Skin is not sterile. It is best to try to make your skin as free of germs as possible before surgery. Proper cleansing with a germ killing soap before surgery can lower the number of germs on your skin. This helps to reduce the risk of infection at the surgical site. Following the instructions listed below will help you prepare your skin for surgery.    How do I use the solution?  Begin using your CHG soap the night before and again the morning of your procedure.   Do not shave the day before or day of surgery.  Remove all jewelry until after surgery. Take off rings and take out all body-piercing jewelry.  Wash your face and hair with normal soap and shampoo before you use the CHG soap.  Apply the CHG solution to a clean wet washcloth. Move away from the water to avoid premature rinsing of the CHG soap as you are applying. Firmly lather your entire body from the neck down. Do not use CHG on your face, eyes, ears, or genitals.   Pay special attention to the area where your incisions will be located.  Do not scrub your skin too hard.  It is important to allow the CHG soap to sit on your skin for 3-5 minutes.  Rinse the solution off your body completely. Do not wash with your normal soap after the CHG soap solution.  Pat yourself dry with a clean, soft towel.  Do not apply powders, lotions or deodorants as these might block how the CHG soap works.   Dress in clean clothing.  Be sure to sleep with clean, freshly laundered sheets.  Be aware that CHG can cause stains on fabric. Rinse your washcloth and other linens that have contact with CHG completely. Use only non-chlorine detergents to launder the items used.

## 2024-04-10 NOTE — PREPROCEDURE INSTRUCTIONS
Medication List            Accurate as of April 10, 2024 10:06 AM. Always use your most recent med list.                amLODIPine 10 mg tablet  Commonly known as: Norvasc  Take 1 tablet (10 mg) by mouth once daily.  Medication Adjustments for Surgery: Take morning of surgery with sip of water, no other fluids     betamethasone valerate 0.1 % cream  Commonly known as: Valisone  Apply topically 2 times a day.  Medication Adjustments for Surgery: Continue until night before surgery     carvedilol 25 mg tablet  Commonly known as: Coreg  Take 1 tablet (25 mg) by mouth 2 times a day.  Medication Adjustments for Surgery: Take morning of surgery with sip of water, no other fluids     cyanocobalamin 1,000 mcg tablet  Commonly known as: Vitamin B-12  Medication Adjustments for Surgery: Stop 7 days before surgery     enoxaparin 100 mg/mL syringe  Commonly known as: Lovenox  Inject 1 mL (100 mg) under the skin every 12 hours.  Medication Adjustments for Surgery: Other (Comment)  Notes to patient: As instructed  Coordinate with prescribing provider for further instructions on this medications prior to surgery.     ezetimibe 10 mg tablet  Commonly known as: Zetia  Take 1 tablet (10 mg) by mouth once daily at bedtime.  Medication Adjustments for Surgery: Stop 1 day before surgery     fluticasone 50 mcg/actuation nasal spray  Commonly known as: Flonase  Medication Adjustments for Surgery: Other (Comment)  Notes to patient: May take the morning of surgery if this medication is prescribed to take in the mornings     melatonin 5 mg capsule  Medication Adjustments for Surgery: Continue until night before surgery     metFORMIN 500 mg tablet  Commonly known as: Glucophage  Medication Adjustments for Surgery: Other (Comment)  Notes to patient: HOLD any evening dose the night before the day of surgery  HOLD the day of surgery     pravastatin 20 mg tablet  Commonly known as: Pravachol  Take 1 tablet (20 mg) by mouth once  daily.  Medication Adjustments for Surgery: Other (Comment)  Notes to patient: May take the morning of surgery if this medication is prescribed to take in the mornings     sertraline 100 mg tablet  Commonly known as: Zoloft  Take 1 tablet (100 mg) by mouth once daily.  Medication Adjustments for Surgery: Take morning of surgery with sip of water, no other fluids     tamsulosin 0.4 mg 24 hr capsule  Commonly known as: Flomax  Take 2 capsules (0.8 mg) by mouth once daily.  Medication Adjustments for Surgery: Take morning of surgery with sip of water, no other fluids     valsartan 160 mg tablet  Commonly known as: Diovan  Take 1 tablet (160 mg) by mouth 2 times a day.  Medication Adjustments for Surgery: Other (Comment)  Notes to patient: Not taking     Vitamin D3 25 MCG (1000 UT) capsule  Generic drug: cholecalciferol  Medication Adjustments for Surgery: Stop 7 days before surgery     warfarin 5 mg tablet  Commonly known as: Coumadin  Take as directed by the anticoagulation clinic. If you are unsure how to take this medication, talk to your nurse or doctor.  Original instructions: As directed to maintain therapeutic INR.  Number of tablets equals 90 day supply.  (Taking 7.5 mg Tues and Thurs, 5 mg all other days of the week)  Medication Adjustments for Surgery: Other (Comment)  Notes to patient: Coordinate with prescribing provider for further instructions on this medications prior to surgery.                The medication bridging plan has been discussed with the surgeon and the patient has been informed of the plan.            PRE-OPERATIVE INSTRUCTIONS    You will receive notification one business day prior to your procedure to confirm your arrival time. It is important that you answer your phone and/or check your messages during this time. If you do not hear from the surgery center by 5 pm. the day before your procedure, please call [phone].     Please enter the building through the Outpatient entrance and  take the elevator off the lobby to the 2nd floor then check in at the Outpatient Surgery desk on the 2nd floor.    INSTRUCTIONS:  Talk to your surgeon for instructions if you should stop your aspirin, blood thinner, or diabetes medicines.  DO NOT take any multivitamins or over the counter supplements for 7-10 days before surgery.  If not being admitted, you must have an adult immediately available to drive you home after surgery. We also highly recommend you have someone stay with you for the entire day and night of your surgery.  For children having surgery, a parent or legal guardian must accompany them to the surgery center. If this is not possible, please call [phone] to make additional arrangements.  For adults who are unable to consent or make medical decisions for themselves, a legal guardian or Power of  must accompany them to the surgery center. If this is not possible, please call [phone] to make additional arrangements.  Wear comfortable, loose fitting clothing.  All jewelry and piercings must be removed. If you are unable to remove an item or have a dermal piercing, please be sure to tell the nurse when you arrive for surgery.  Nail polish and make-up must be removed.  Avoid smoking or consuming alcohol for 24 hours before surgery.  To help prevent infection, please take a shower/bath and wash your hair the night before and/or morning of surgery (or follow other specific bathing instructions provided).    Preoperative Fasting Guidelines    Why must I stop eating and drinking near surgery time?  With sedation, food or liquid in your stomach can enter your lungs causing serious complications  Increases nausea and vomiting    When do I need to stop eating and drinking before my surgery?  Do not eat any solid food after midnight the night before your surgery/procedure.  You may have up to TEN ounces of clear liquid until TWO hours before your instructed arrival time to the hospital.  This  includes water, black tea/coffee, (no milk or cream) apple juice, and electrolyte drinks (Gatorade).   You may chew gum until TWO hours before your surgery/procedure    If you have any questions or concerns, please call Pre-Admission Testing at [phone].         Home Preoperative Antibacterial Shower with Chlorhexidine gluconate (CHG)     What is a home preoperative antibacterial shower?  This shower is a way of cleaning the skin with a germ killing solution before surgery. The solution contains chlorhexidine gluconate, commonly known as CHG. CHG is a skin cleanser with germ killing ability. Let your doctor know if you are allergic to chlorhexidine.    Why do I need to take a preoperative antibacterial shower?  Skin is not sterile. It is best to try to make your skin as free of germs as possible before surgery. Proper cleansing with a germ killing soap before surgery can lower the number of germs on your skin. This helps to reduce the risk of infection at the surgical site. Following the instructions listed below will help you prepare your skin for surgery.    How do I use the solution?  Begin using your CHG soap the night before and again the morning of your procedure.   Do not shave the day before or day of surgery.  Remove all jewelry until after surgery. Take off rings and take out all body-piercing jewelry.  Wash your face and hair with normal soap and shampoo before you use the CHG soap.  Apply the CHG solution to a clean wet washcloth. Move away from the water to avoid premature rinsing of the CHG soap as you are applying. Firmly lather your entire body from the neck down. Do not use CHG on your face, eyes, ears, or genitals.   Pay special attention to the area where your incisions will be located.  Do not scrub your skin too hard.  It is important to allow the CHG soap to sit on your skin for 3-5 minutes.  Rinse the solution off your body completely. Do not wash with your normal soap after the CHG soap  solution.  Pat yourself dry with a clean, soft towel.  Do not apply powders, lotions or deodorants as these might block how the CHG soap works.   Dress in clean clothing.  Be sure to sleep with clean, freshly laundered sheets.  Be aware that CHG can cause stains on fabric. Rinse your washcloth and other linens that have contact with CHG completely. Use only non-chlorine detergents to launder the items used.

## 2024-04-14 LAB
ATRIAL RATE: 64 BPM
PR INTERVAL: 72 MS
Q ONSET: 197 MS
QRS COUNT: 11 BEATS
QRS DURATION: 172 MS
QT INTERVAL: 492 MS
QTC CALCULATION(BAZETT): 507 MS
QTC FREDERICIA: 502 MS
R AXIS: 3 DEGREES
T AXIS: 91 DEGREES
T OFFSET: 443 MS
VENTRICULAR RATE: 64 BPM

## 2024-04-17 ENCOUNTER — LAB (OUTPATIENT)
Dept: LAB | Facility: LAB | Age: 79
End: 2024-04-17
Payer: MEDICARE

## 2024-04-17 DIAGNOSIS — Z79.01 LONG TERM (CURRENT) USE OF ANTICOAGULANTS: ICD-10-CM

## 2024-04-17 LAB
INR PPP: 3 (ref 0.9–1.1)
PROTHROMBIN TIME: 34.1 SECONDS (ref 9.8–12.8)

## 2024-04-17 PROCEDURE — 36415 COLL VENOUS BLD VENIPUNCTURE: CPT

## 2024-04-17 PROCEDURE — 85610 PROTHROMBIN TIME: CPT

## 2024-04-19 ENCOUNTER — ANTICOAGULATION - WARFARIN VISIT (OUTPATIENT)
Dept: CARDIOLOGY | Facility: CLINIC | Age: 79
End: 2024-04-19
Payer: MEDICARE

## 2024-04-19 RX ORDER — SODIUM CHLORIDE 9 MG/ML
50 INJECTION, SOLUTION INTRAVENOUS CONTINUOUS
Status: CANCELLED | OUTPATIENT
Start: 2024-04-23

## 2024-04-19 NOTE — PROGRESS NOTES
Called and spoke with Mando Tay regarding INR 3.0 with goal 2.5- 3.0.  He is currently taking coumadin 7.5 mg on Tue and Thurs.  Coumadin all other days.  He will be having surgery on 4/23/24 for which he will be bridged with Lovenox along with daily INR after surgery.  Further coumadin dosing recommendation pending post surgery INR results.

## 2024-04-23 ENCOUNTER — HOSPITAL ENCOUNTER (OUTPATIENT)
Dept: RADIOLOGY | Facility: HOSPITAL | Age: 79
Discharge: HOME | End: 2024-04-23
Payer: MEDICARE

## 2024-04-23 ENCOUNTER — ANESTHESIA (OUTPATIENT)
Dept: RADIOLOGY | Facility: HOSPITAL | Age: 79
End: 2024-04-23
Payer: MEDICARE

## 2024-04-23 ENCOUNTER — ANESTHESIA EVENT (OUTPATIENT)
Dept: RADIOLOGY | Facility: HOSPITAL | Age: 79
End: 2024-04-23
Payer: MEDICARE

## 2024-04-23 VITALS
HEART RATE: 62 BPM | RESPIRATION RATE: 18 BRPM | DIASTOLIC BLOOD PRESSURE: 61 MMHG | OXYGEN SATURATION: 92 % | WEIGHT: 210 LBS | SYSTOLIC BLOOD PRESSURE: 132 MMHG | HEIGHT: 70 IN | TEMPERATURE: 97.2 F | BODY MASS INDEX: 30.06 KG/M2

## 2024-04-23 DIAGNOSIS — N28.89 RENAL MASS, LEFT: ICD-10-CM

## 2024-04-23 LAB
GLUCOSE BLD MANUAL STRIP-MCNC: 124 MG/DL (ref 74–99)
INR PPP: 1.8 (ref 0.9–1.1)
PROTHROMBIN TIME: 20.5 SECONDS (ref 9.8–12.8)

## 2024-04-23 PROCEDURE — 50593 PERC CRYO ABLATE RENAL TUM: CPT | Mod: LEFT SIDE | Performed by: RADIOLOGY

## 2024-04-23 PROCEDURE — 99100 ANES PT EXTEME AGE<1 YR&>70: CPT | Performed by: ANESTHESIOLOGY

## 2024-04-23 PROCEDURE — 50200 RENAL BIOPSY PERQ: CPT | Mod: LEFT SIDE | Performed by: RADIOLOGY

## 2024-04-23 PROCEDURE — 2500000005 HC RX 250 GENERAL PHARMACY W/O HCPCS: Performed by: RADIOLOGY

## 2024-04-23 PROCEDURE — 36415 COLL VENOUS BLD VENIPUNCTURE: CPT | Performed by: RADIOLOGY

## 2024-04-23 PROCEDURE — C2618 PROBE/NEEDLE, CRYO: HCPCS

## 2024-04-23 PROCEDURE — 2500000004 HC RX 250 GENERAL PHARMACY W/ HCPCS (ALT 636 FOR OP/ED): Performed by: ANESTHESIOLOGY

## 2024-04-23 PROCEDURE — 2720000007 HC OR 272 NO HCPCS

## 2024-04-23 PROCEDURE — 50593 PERC CRYO ABLATE RENAL TUM: CPT | Mod: LT

## 2024-04-23 PROCEDURE — 3700000002 HC GENERAL ANESTHESIA TIME - EACH INCREMENTAL 1 MINUTE

## 2024-04-23 PROCEDURE — 77013 CT GUIDE FOR TISSUE ABLATION: CPT | Mod: LEFT SIDE | Performed by: RADIOLOGY

## 2024-04-23 PROCEDURE — 2500000005 HC RX 250 GENERAL PHARMACY W/O HCPCS: Performed by: NURSE ANESTHETIST, CERTIFIED REGISTERED

## 2024-04-23 PROCEDURE — 88341 IMHCHEM/IMCYTCHM EA ADD ANTB: CPT | Performed by: STUDENT IN AN ORGANIZED HEALTH CARE EDUCATION/TRAINING PROGRAM

## 2024-04-23 PROCEDURE — 2500000004 HC RX 250 GENERAL PHARMACY W/ HCPCS (ALT 636 FOR OP/ED): Performed by: NURSE ANESTHETIST, CERTIFIED REGISTERED

## 2024-04-23 PROCEDURE — 3700000001 HC GENERAL ANESTHESIA TIME - INITIAL BASE CHARGE

## 2024-04-23 PROCEDURE — 85610 PROTHROMBIN TIME: CPT | Performed by: RADIOLOGY

## 2024-04-23 PROCEDURE — A50593 PR ABLATION RENAL TUMOR UNILATERAL PERQ CRYOTHERAPY: Performed by: NURSE ANESTHETIST, CERTIFIED REGISTERED

## 2024-04-23 PROCEDURE — 82947 ASSAY GLUCOSE BLOOD QUANT: CPT

## 2024-04-23 PROCEDURE — A50593 PR ABLATION RENAL TUMOR UNILATERAL PERQ CRYOTHERAPY: Performed by: ANESTHESIOLOGY

## 2024-04-23 PROCEDURE — 2550000001 HC RX 255 CONTRASTS: Performed by: RADIOLOGY

## 2024-04-23 PROCEDURE — 50593 PERC CRYO ABLATE RENAL TUM: CPT | Mod: LT,MUE

## 2024-04-23 PROCEDURE — 88305 TISSUE EXAM BY PATHOLOGIST: CPT | Performed by: STUDENT IN AN ORGANIZED HEALTH CARE EDUCATION/TRAINING PROGRAM

## 2024-04-23 PROCEDURE — 88341 IMHCHEM/IMCYTCHM EA ADD ANTB: CPT | Mod: 59 | Performed by: UROLOGY

## 2024-04-23 PROCEDURE — 2500000004 HC RX 250 GENERAL PHARMACY W/ HCPCS (ALT 636 FOR OP/ED): Performed by: RADIOLOGY

## 2024-04-23 PROCEDURE — 50200 RENAL BIOPSY PERQ: CPT | Mod: LT

## 2024-04-23 PROCEDURE — 88342 IMHCHEM/IMCYTCHM 1ST ANTB: CPT | Performed by: STUDENT IN AN ORGANIZED HEALTH CARE EDUCATION/TRAINING PROGRAM

## 2024-04-23 RX ORDER — HYDROCODONE BITARTRATE AND ACETAMINOPHEN 5; 325 MG/1; MG/1
1 TABLET ORAL EVERY 4 HOURS PRN
Status: DISCONTINUED | OUTPATIENT
Start: 2024-04-23 | End: 2024-04-23 | Stop reason: HOSPADM

## 2024-04-23 RX ORDER — MIDAZOLAM HYDROCHLORIDE 1 MG/ML
1 INJECTION, SOLUTION INTRAMUSCULAR; INTRAVENOUS ONCE AS NEEDED
Status: DISCONTINUED | OUTPATIENT
Start: 2024-04-23 | End: 2024-04-23 | Stop reason: HOSPADM

## 2024-04-23 RX ORDER — MIDAZOLAM HYDROCHLORIDE 1 MG/ML
INJECTION, SOLUTION INTRAMUSCULAR; INTRAVENOUS AS NEEDED
Status: DISCONTINUED | OUTPATIENT
Start: 2024-04-23 | End: 2024-04-23

## 2024-04-23 RX ORDER — SODIUM CHLORIDE, SODIUM LACTATE, POTASSIUM CHLORIDE, CALCIUM CHLORIDE 600; 310; 30; 20 MG/100ML; MG/100ML; MG/100ML; MG/100ML
50 INJECTION, SOLUTION INTRAVENOUS CONTINUOUS
Status: DISCONTINUED | OUTPATIENT
Start: 2024-04-23 | End: 2024-04-24 | Stop reason: HOSPADM

## 2024-04-23 RX ORDER — CEFTRIAXONE 1 G/1
1 INJECTION, POWDER, FOR SOLUTION INTRAMUSCULAR; INTRAVENOUS ONCE
Status: DISCONTINUED | OUTPATIENT
Start: 2024-04-23 | End: 2024-04-23

## 2024-04-23 RX ORDER — SODIUM CHLORIDE 9 MG/ML
50 INJECTION, SOLUTION INTRAVENOUS CONTINUOUS
Status: DISCONTINUED | OUTPATIENT
Start: 2024-04-23 | End: 2024-04-23

## 2024-04-23 RX ORDER — SODIUM CHLORIDE, SODIUM LACTATE, POTASSIUM CHLORIDE, CALCIUM CHLORIDE 600; 310; 30; 20 MG/100ML; MG/100ML; MG/100ML; MG/100ML
100 INJECTION, SOLUTION INTRAVENOUS CONTINUOUS
Status: DISCONTINUED | OUTPATIENT
Start: 2024-04-23 | End: 2024-04-23 | Stop reason: HOSPADM

## 2024-04-23 RX ORDER — GLYCOPYRROLATE 0.2 MG/ML
INJECTION INTRAMUSCULAR; INTRAVENOUS AS NEEDED
Status: DISCONTINUED | OUTPATIENT
Start: 2024-04-23 | End: 2024-04-23

## 2024-04-23 RX ORDER — ONDANSETRON HYDROCHLORIDE 2 MG/ML
INJECTION, SOLUTION INTRAVENOUS AS NEEDED
Status: DISCONTINUED | OUTPATIENT
Start: 2024-04-23 | End: 2024-04-23

## 2024-04-23 RX ORDER — METOCLOPRAMIDE HYDROCHLORIDE 5 MG/ML
10 INJECTION INTRAMUSCULAR; INTRAVENOUS ONCE AS NEEDED
Status: DISCONTINUED | OUTPATIENT
Start: 2024-04-23 | End: 2024-04-23 | Stop reason: HOSPADM

## 2024-04-23 RX ORDER — ROCURONIUM BROMIDE 10 MG/ML
INJECTION, SOLUTION INTRAVENOUS AS NEEDED
Status: DISCONTINUED | OUTPATIENT
Start: 2024-04-23 | End: 2024-04-23

## 2024-04-23 RX ORDER — LIDOCAINE HYDROCHLORIDE 10 MG/ML
INJECTION, SOLUTION EPIDURAL; INFILTRATION; INTRACAUDAL; PERINEURAL
Status: COMPLETED | OUTPATIENT
Start: 2024-04-23 | End: 2024-04-23

## 2024-04-23 RX ORDER — LABETALOL HYDROCHLORIDE 5 MG/ML
5 INJECTION, SOLUTION INTRAVENOUS
Status: DISCONTINUED | OUTPATIENT
Start: 2024-04-23 | End: 2024-04-23 | Stop reason: HOSPADM

## 2024-04-23 RX ORDER — FENTANYL CITRATE 50 UG/ML
INJECTION, SOLUTION INTRAMUSCULAR; INTRAVENOUS AS NEEDED
Status: DISCONTINUED | OUTPATIENT
Start: 2024-04-23 | End: 2024-04-23

## 2024-04-23 RX ORDER — SUCCINYLCHOLINE CHLORIDE 100 MG/5ML
SYRINGE (ML) INTRAVENOUS AS NEEDED
Status: DISCONTINUED | OUTPATIENT
Start: 2024-04-23 | End: 2024-04-23

## 2024-04-23 RX ORDER — PHENYLEPHRINE 10 MG/250 ML(40 MCG/ML)IN 0.9 % SOD.CHLORIDE INTRAVENOUS
CONTINUOUS PRN
Status: DISCONTINUED | OUTPATIENT
Start: 2024-04-23 | End: 2024-04-23

## 2024-04-23 RX ORDER — PROPOFOL 10 MG/ML
INJECTION, EMULSION INTRAVENOUS AS NEEDED
Status: DISCONTINUED | OUTPATIENT
Start: 2024-04-23 | End: 2024-04-23

## 2024-04-23 RX ORDER — CEFTRIAXONE 1 G/50ML
1 INJECTION, SOLUTION INTRAVENOUS ONCE
Status: COMPLETED | OUTPATIENT
Start: 2024-04-23 | End: 2024-04-23

## 2024-04-23 RX ORDER — LIDOCAINE HYDROCHLORIDE 20 MG/ML
INJECTION, SOLUTION INFILTRATION; PERINEURAL AS NEEDED
Status: DISCONTINUED | OUTPATIENT
Start: 2024-04-23 | End: 2024-04-23

## 2024-04-23 RX ORDER — HYDRALAZINE HYDROCHLORIDE 20 MG/ML
5 INJECTION INTRAMUSCULAR; INTRAVENOUS EVERY 30 MIN PRN
Status: DISCONTINUED | OUTPATIENT
Start: 2024-04-23 | End: 2024-04-23 | Stop reason: HOSPADM

## 2024-04-23 RX ORDER — ALBUTEROL SULFATE 0.83 MG/ML
2.5 SOLUTION RESPIRATORY (INHALATION)
Status: DISCONTINUED | OUTPATIENT
Start: 2024-04-23 | End: 2024-04-23 | Stop reason: HOSPADM

## 2024-04-23 RX ORDER — DIPHENHYDRAMINE HYDROCHLORIDE 50 MG/ML
12.5 INJECTION INTRAMUSCULAR; INTRAVENOUS ONCE AS NEEDED
Status: DISCONTINUED | OUTPATIENT
Start: 2024-04-23 | End: 2024-04-23 | Stop reason: HOSPADM

## 2024-04-23 RX ORDER — PHENYLEPHRINE HCL IN 0.9% NACL 1 MG/10 ML
SYRINGE (ML) INTRAVENOUS AS NEEDED
Status: DISCONTINUED | OUTPATIENT
Start: 2024-04-23 | End: 2024-04-23

## 2024-04-23 RX ORDER — HYDROMORPHONE HYDROCHLORIDE 1 MG/ML
1 INJECTION, SOLUTION INTRAMUSCULAR; INTRAVENOUS; SUBCUTANEOUS EVERY 5 MIN PRN
Status: DISCONTINUED | OUTPATIENT
Start: 2024-04-23 | End: 2024-04-23 | Stop reason: HOSPADM

## 2024-04-23 RX ORDER — HYDROMORPHONE HYDROCHLORIDE 1 MG/ML
0.5 INJECTION, SOLUTION INTRAMUSCULAR; INTRAVENOUS; SUBCUTANEOUS EVERY 5 MIN PRN
Status: DISCONTINUED | OUTPATIENT
Start: 2024-04-23 | End: 2024-04-23 | Stop reason: HOSPADM

## 2024-04-23 RX ADMIN — CEFTRIAXONE SODIUM 1 G: 1 INJECTION, SOLUTION INTRAVENOUS at 13:40

## 2024-04-23 RX ADMIN — SUGAMMADEX 200 MG: 100 INJECTION, SOLUTION INTRAVENOUS at 15:24

## 2024-04-23 RX ADMIN — SODIUM CHLORIDE, POTASSIUM CHLORIDE, SODIUM LACTATE AND CALCIUM CHLORIDE 50 ML/HR: 600; 310; 30; 20 INJECTION, SOLUTION INTRAVENOUS at 12:30

## 2024-04-23 RX ADMIN — ONDANSETRON 4 MG: 2 INJECTION, SOLUTION INTRAMUSCULAR; INTRAVENOUS at 14:55

## 2024-04-23 RX ADMIN — LIDOCAINE HYDROCHLORIDE 10 ML: 10 INJECTION, SOLUTION EPIDURAL; INFILTRATION; INTRACAUDAL; PERINEURAL at 14:06

## 2024-04-23 RX ADMIN — Medication 200 MCG: at 14:02

## 2024-04-23 RX ADMIN — PROPOFOL 100 MG: 10 INJECTION, EMULSION INTRAVENOUS at 13:24

## 2024-04-23 RX ADMIN — FENTANYL CITRATE 25 MCG: 50 INJECTION, SOLUTION INTRAMUSCULAR; INTRAVENOUS at 15:13

## 2024-04-23 RX ADMIN — LIDOCAINE HYDROCHLORIDE 100 MG: 20 INJECTION, SOLUTION INFILTRATION; PERINEURAL at 13:24

## 2024-04-23 RX ADMIN — Medication 100 MCG: at 13:45

## 2024-04-23 RX ADMIN — PROPOFOL 20 MG: 10 INJECTION, EMULSION INTRAVENOUS at 15:05

## 2024-04-23 RX ADMIN — PROPOFOL 50 MG: 10 INJECTION, EMULSION INTRAVENOUS at 15:18

## 2024-04-23 RX ADMIN — Medication 100 MG: at 13:25

## 2024-04-23 RX ADMIN — MIDAZOLAM 1 MG: 1 INJECTION INTRAMUSCULAR; INTRAVENOUS at 13:19

## 2024-04-23 RX ADMIN — ROCURONIUM BROMIDE 10 MG: 10 INJECTION INTRAVENOUS at 13:25

## 2024-04-23 RX ADMIN — FENTANYL CITRATE 50 MCG: 50 INJECTION, SOLUTION INTRAMUSCULAR; INTRAVENOUS at 13:24

## 2024-04-23 RX ADMIN — PROPOFOL 30 MG: 10 INJECTION, EMULSION INTRAVENOUS at 13:26

## 2024-04-23 RX ADMIN — GLYCOPYRROLATE 0.2 MG: 0.2 INJECTION, SOLUTION INTRAMUSCULAR; INTRAVENOUS at 13:50

## 2024-04-23 RX ADMIN — Medication 0.64 MCG/KG/MIN: at 14:12

## 2024-04-23 RX ADMIN — ROCURONIUM BROMIDE 40 MG: 10 INJECTION INTRAVENOUS at 13:30

## 2024-04-23 RX ADMIN — ROCURONIUM BROMIDE 10 MG: 10 INJECTION INTRAVENOUS at 14:13

## 2024-04-23 RX ADMIN — IOHEXOL 50 ML: 350 INJECTION, SOLUTION INTRAVENOUS at 14:00

## 2024-04-23 RX ADMIN — Medication 200 MCG: at 13:50

## 2024-04-23 RX ADMIN — Medication 200 MCG: at 14:12

## 2024-04-23 SDOH — HEALTH STABILITY: MENTAL HEALTH: CURRENT SMOKER: 0

## 2024-04-23 ASSESSMENT — COLUMBIA-SUICIDE SEVERITY RATING SCALE - C-SSRS
2. HAVE YOU ACTUALLY HAD ANY THOUGHTS OF KILLING YOURSELF?: NO
6. HAVE YOU EVER DONE ANYTHING, STARTED TO DO ANYTHING, OR PREPARED TO DO ANYTHING TO END YOUR LIFE?: NO
1. IN THE PAST MONTH, HAVE YOU WISHED YOU WERE DEAD OR WISHED YOU COULD GO TO SLEEP AND NOT WAKE UP?: NO

## 2024-04-23 ASSESSMENT — PAIN SCALES - GENERAL
PAINLEVEL_OUTOF10: 0 - NO PAIN

## 2024-04-23 ASSESSMENT — PAIN - FUNCTIONAL ASSESSMENT
PAIN_FUNCTIONAL_ASSESSMENT: 0-10

## 2024-04-23 NOTE — PRE-PROCEDURE NOTE
Interventional Radiology Preprocedure Note    Indication for procedure: The encounter diagnosis was Renal mass, left. Presenting for cryoablation. Discussed risks and benefits of the procedure including but not limited to the risk of bleeding (INR 1.8 which meets SIR guidelines) as well as kidney, pancreas, and/or colon damage.    Relevant review of systems: NA    Relevant Labs:   Lab Results   Component Value Date    CREATININE 1.14 04/10/2024    EGFR 65 04/10/2024    INR 1.8 (H) 04/23/2024    PROTIME 20.5 (H) 04/23/2024       Planned Sedation/Anesthesia: GA    Airway assessment: Per anesthesia    Directed physical examination:    Awake and alert, oriented  No acute distress  Regular rate, rhythm  Breathing non-labored    Mallampati: per anesthesia    ASA Score: per anesthesia    Benefits, risks and alternatives of procedure and planned sedation have been discussed with the patient and/or their representative. All questions answered and they agree to proceed.

## 2024-04-23 NOTE — POST-PROCEDURE NOTE
Interventional Radiology Brief Postprocedure Note    Attending: Froilan Reina MD    Assistant: none    Diagnosis: Left renal mass    Description of procedure: Left renal mass biopsy and cryoablation.     Anesthesia:  General    Complications: None    Estimated Blood Loss: minimal    Medications (Filter: Administrations occurring from 1314 to 1541 on 04/23/24) As of 04/23/24 1541      lidocaine PF (Xylocaine) 10 mg/mL (1 %) injection (mL) Total volume:  10 mL      Date/Time Rate/Dose/Volume Action       04/23/24  1406 10 mL Given                   ID Type Source Tests Collected by Time   1 : Left Renal Mass Tissue KIDNEY MASS BIOPSY LEFT SURGICAL PATHOLOGY EXAM Froilan Reina MD 4/23/2024 1409         See detailed result report with images in PACS.    The patient tolerated the procedure well without incident or complication and is in stable condition.

## 2024-04-23 NOTE — ANESTHESIA POSTPROCEDURE EVALUATION
Patient: Mando Tay    Procedure Summary       Date: 04/23/24 Room / [facility]: [facility]    Anesthesia Start: 1317 Anesthesia Stop:     Procedures:       CT GUIDED CRYOABLATION RENAL LEFT      US GUIDED PERCUTANEOUS BIOPSY RENAL LEFT Diagnosis:       Renal mass, left      (left renal cryoablation)      (left renal biopsy during cryoablation)    Scheduled Providers:  Responsible Provider: Leta Rivera MD    Anesthesia Type: general ASA Status: 3            Anesthesia Type: general    Vitals Value Taken Time   /60 04/23/24 1542   Temp 36.1 04/23/24 1543   Pulse 61 04/23/24 1543   Resp 14 04/23/24 1543   SpO2 99 % 04/23/24 1542   Vitals shown include unfiled device data.    Anesthesia Post Evaluation    Patient location during evaluation: PACU  Patient participation: complete - patient cannot participate  Level of consciousness: sleepy but conscious and responsive to verbal stimuli  Pain management: adequate  Airway patency: patent  Two or more strategies used to mitigate risk of obstructive sleep apnea  Cardiovascular status: acceptable  Respiratory status: acceptable, face mask, nonlabored ventilation, spontaneous ventilation and unassisted  Hydration status: acceptable  Postoperative Nausea and Vomiting: none  Comments: Handoff to Radha PACU RN        There were no known notable events for this encounter.

## 2024-04-23 NOTE — ANESTHESIA PROCEDURE NOTES
Airway  Date/Time: 4/23/2024 1:27 PM  Urgency: elective    Airway not difficult    Staffing  Performed: CRNA   Authorized by: Leta Rivera MD    Performed by: ROBERT Kahn-KAVITHA  Patient location during procedure: OR    Indications and Patient Condition  Indications for airway management: anesthesia  Spontaneous Ventilation: absent  Sedation level: deep  Preoxygenated: yes  Patient position: sniffing  Mask difficulty assessment: 1 - vent by mask    Final Airway Details  Final airway type: endotracheal airway      Successful airway: ETT  Cuffed: yes   Successful intubation technique: video laryngoscopy (Glidescope)  Facilitating devices/methods: intubating stylet  Blade size: #4 (lopro 4)  ETT size (mm): 7.5  Cormack-Lehane Classification: grade IIa - partial view of glottis  Placement verified by: chest auscultation, capnometry and palpation of cuff   Cuff volume (mL): 7  Measured from: lips  ETT to lips (cm): 23  Number of attempts at approach: 1

## 2024-04-23 NOTE — ANESTHESIA PREPROCEDURE EVALUATION
Patient: Mando Tay    Procedure Information       Date/Time: 04/23/24 1300    Procedure: CT GUIDED CRYOABLATION RENAL LEFT    [place] [facility] [place]            Relevant Problems   Cardiac   (+) Atypical atrial flutter (Multi)   (+) Hyperlipidemia   (+) Mitral valve disease   (+) Paroxysmal atrial fibrillation (Multi)   (+) Paroxysmal atrial flutter (Multi)   (+) Presence of cardiac pacemaker   (+) Primary hypertension   (+) Sick sinus syndrome (Multi)      Pulmonary   (+) RAMSES (obstructive sleep apnea)      Neuro   (+) Atypical depressive disorder   (+) Major depressive disorder with single episode, in full remission (CMS-HCC)      GI   (+) Dysphagia      /Renal   (+) BPH with obstruction/lower urinary tract symptoms      Endocrine   (+) Class 1 obesity with body mass index (BMI) of 32.0 to 32.9 in adult      Hematology   (+) Anticoagulant long-term use   (+) Cutaneous T-cell lymphoma (Multi)      Musculoskeletal   (+) Cervical spondylosis without myelopathy   (+) Chondromalacia of patella   (+) Degenerative cervical spinal stenosis   (+) Lumbar stenosis with neurogenic claudication   (+) Primary localized osteoarthrosis of shoulder region      HEENT   (+) Bilateral sensorineural hearing loss      ID   (+) Acute bronchitis due to other specified organisms   (+) Viral wart, unspecified      Skin   (+) Pigmented purpuric dermatosis   (+) Superficial basal cell carcinoma of skin of left shoulder       Clinical information reviewed:   Tobacco  Allergies  Meds   Med Hx  Surg Hx   Fam Hx  Soc Hx        NPO Detail:  No data recorded     Physical Exam    Airway  Mallampati: III  Neck ROM: limited  Comments: Short neck, very poor ROM   Cardiovascular   Rate: normal  (+) systolic click     Dental - normal exam     Pulmonary - normal exam     Abdominal   (+) obese         There were no vitals filed for this visit.    Past Surgical History:   Procedure Laterality Date    CORONARY ARTERY BYPASS GRAFT   02/22/2017    Coronary Artery Surgery    HERNIA REPAIR  02/22/2017    Inguinal Hernia Repair    INSERT / REPLACE / REMOVE PACEMAKER      x3    OTHER SURGICAL HISTORY  11/17/2021    Surgery    OTHER SURGICAL HISTORY  11/17/2021    Cardioversion    OTHER SURGICAL HISTORY  12/20/2021    Mitral valve replacement    OTHER SURGICAL HISTORY  12/20/2021    Cardiac catheterization     Past Medical History:   Diagnosis Date    Acute myocardial infarction, unspecified (Multi) 11/17/2021    Acute myocardial infarction    Cutaneous T-cell lymphoma (Multi)     Depression     Diabetes mellitus (Multi)     Epididymitis 06/02/2022    Epididymitis, right    Essential (primary) hypertension 12/08/2022    Hypertension    Heart disease     Kidney tumor     Obesity, unspecified 04/04/2022    Class 1 obesity with body mass index (BMI) of 31.0 to 31.9 in adult    Obesity, unspecified 06/08/2022    Class 1 obesity with body mass index (BMI) of 30.0 to 30.9 in adult    Obesity, unspecified 01/10/2022    Class 1 obesity with body mass index (BMI) of 31.0 to 31.9 in adult    Obesity, unspecified 01/31/2022    Class 1 obesity with body mass index (BMI) of 31.0 to 31.9 in adult    Other abnormal findings in urine 05/09/2022    Dark urine    Other specified disorders of kidney and ureter     Bilateral renal masses    Other specified disorders of kidney and ureter 05/09/2022    Renal mass, right    Other specified disorders of the male genital organs 05/09/2022    Swelling of right half of scrotum    Other symptoms and signs involving the musculoskeletal system 11/17/2021    Leg fatigue    Personal history of other diseases of the circulatory system 11/17/2021    History of mitral valve insufficiency    Personal history of other diseases of the respiratory system 12/09/2021    History of acute bronchitis    Personal history of other specified conditions 04/04/2022    History of shortness of breath    Personal history of other specified conditions      History of snoring    Personal history of pneumonia (recurrent) 01/31/2022    History of pneumonia    Prediabetes 06/18/2018    Borderline diabetes mellitus    Scrotal pain 05/09/2022    Acute pain in scrotum    Shortness of breath 12/09/2022    Shortness of breath on exertion    Sleep apnea     cpap    Unspecified cataract 02/22/2017    Cataract of right eye    Unspecified cataract 02/22/2017    Cataract of left eye    Unspecified osteoarthritis, unspecified site     Arthritis       Current Outpatient Medications:     amLODIPine (Norvasc) 10 mg tablet, Take 1 tablet (10 mg) by mouth once daily., Disp: 90 tablet, Rfl: 1    betamethasone valerate (Valisone) 0.1 % cream, Apply topically 2 times a day. (Patient not taking: Reported on 4/10/2024), Disp: 15 g, Rfl: 0    carvedilol (Coreg) 25 mg tablet, Take 1 tablet (25 mg) by mouth 2 times a day., Disp: 180 tablet, Rfl: 1    cholecalciferol (Vitamin D3) 25 MCG (1000 UT) capsule, Take 1 capsule (25 mcg) by mouth once daily., Disp: , Rfl:     cyanocobalamin (Vitamin B-12) 1,000 mcg tablet, Take 100 mcg by mouth once daily., Disp: , Rfl:     enoxaparin (Lovenox) 100 mg/mL syringe, Inject 1 mL (100 mg) under the skin every 12 hours., Disp: 10 each, Rfl: 1    ezetimibe (Zetia) 10 mg tablet, Take 1 tablet (10 mg) by mouth once daily at bedtime., Disp: 90 tablet, Rfl: 1    fluticasone (Flonase) 50 mcg/actuation nasal spray, Administer 1 spray into each nostril once daily. As needed, Disp: , Rfl:     melatonin 5 mg capsule, Take 1 capsule (5 mg) by mouth as needed at bedtime., Disp: , Rfl:     metFORMIN (Glucophage) 500 mg tablet, Take 1 tablet (500 mg) by mouth 2 times a day with meals., Disp: , Rfl:     pravastatin (Pravachol) 20 mg tablet, Take 1 tablet (20 mg) by mouth once daily., Disp: 90 tablet, Rfl: 1    sertraline (Zoloft) 100 mg tablet, Take 1 tablet (100 mg) by mouth once daily., Disp: 90 tablet, Rfl: 1    tamsulosin (Flomax) 0.4 mg 24 hr capsule, Take 2 capsules  (0.8 mg) by mouth once daily., Disp: 90 capsule, Rfl: 3    valsartan (Diovan) 160 mg tablet, Take 1 tablet (160 mg) by mouth 2 times a day. (Patient not taking: Reported on 4/10/2024), Disp: 180 tablet, Rfl: 1    warfarin (Coumadin) 5 mg tablet, As directed to maintain therapeutic INR.  Number of tablets equals 90 day supply.  (Taking 7.5 mg Tues and Thurs, 5 mg all other days of the week), Disp: 110 tablet, Rfl: 1  Prior to Admission medications    Medication Sig Start Date End Date Taking? Authorizing Provider   amLODIPine (Norvasc) 10 mg tablet Take 1 tablet (10 mg) by mouth once daily. 4/4/24   Ck Celis MD   betamethasone valerate (Valisone) 0.1 % cream Apply topically 2 times a day.  Patient not taking: Reported on 4/10/2024 12/8/23   Megan Sierra MD   carvedilol (Coreg) 25 mg tablet Take 1 tablet (25 mg) by mouth 2 times a day. 4/4/24   Ck Celis MD   cholecalciferol (Vitamin D3) 25 MCG (1000 UT) capsule Take 1 capsule (25 mcg) by mouth once daily.    Historical Provider, MD   cyanocobalamin (Vitamin B-12) 1,000 mcg tablet Take 100 mcg by mouth once daily.    Historical Provider, MD   enoxaparin (Lovenox) 100 mg/mL syringe Inject 1 mL (100 mg) under the skin every 12 hours. 4/4/24   Ck Celis MD   ezetimibe (Zetia) 10 mg tablet Take 1 tablet (10 mg) by mouth once daily at bedtime. 4/4/24   Ck Celis MD   fluticasone (Flonase) 50 mcg/actuation nasal spray Administer 1 spray into each nostril once daily. As needed    Historical Provider, MD   melatonin 5 mg capsule Take 1 capsule (5 mg) by mouth as needed at bedtime.    Historical Provider, MD   metFORMIN (Glucophage) 500 mg tablet Take 1 tablet (500 mg) by mouth 2 times a day with meals. 2/25/22   Historical Provider, MD   pravastatin (Pravachol) 20 mg tablet Take 1 tablet (20 mg) by mouth once daily. 4/4/24   Ck Celis MD   sertraline (Zoloft) 100 mg tablet Take 1 tablet (100 mg) by mouth once daily. 3/15/24   Juan West,  MD   tamsulosin (Flomax) 0.4 mg 24 hr capsule Take 2 capsules (0.8 mg) by mouth once daily. 3/12/24   Rafael Harp MD   valsartan (Diovan) 160 mg tablet Take 1 tablet (160 mg) by mouth 2 times a day.  Patient not taking: Reported on 4/10/2024 4/4/24 4/4/25  Ck Celis MD   warfarin (Coumadin) 5 mg tablet As directed to maintain therapeutic INR.  Number of tablets equals 90 day supply.  (Taking 7.5 mg Tues and Thurs, 5 mg all other days of the week) 4/4/24   Ck Celis MD     Allergies   Allergen Reactions    Atorvastatin Myalgia    Flecainide Unknown    Rosuvastatin Myalgia    Statins-Hmg-Coa Reductase Inhibitors Unknown     Statins do not work    Sulfa (Sulfonamide Antibiotics) Unknown    Procainamide Rash     Social History     Tobacco Use    Smoking status: Never    Smokeless tobacco: Never   Substance Use Topics    Alcohol use: Not Currently         Chemistry    Lab Results   Component Value Date/Time     04/10/2024 1021    K 4.1 04/10/2024 1021     04/10/2024 1021    CO2 31 04/10/2024 1021    BUN 23 04/10/2024 1021    CREATININE 1.14 04/10/2024 1021    Lab Results   Component Value Date/Time    CALCIUM 9.1 04/10/2024 1021    ALKPHOS 67 01/23/2024 1124    AST 18 01/23/2024 1124    ALT 16 01/23/2024 1124    BILITOT 0.8 01/23/2024 1124          Lab Results   Component Value Date/Time    WBC 5.5 04/10/2024 1021    HGB 13.6 04/10/2024 1021    HCT 41.2 04/10/2024 1021     (L) 04/10/2024 1021     Lab Results   Component Value Date/Time    PROTIME 34.1 (H) 04/17/2024 1143    INR 3.0 (H) 04/17/2024 1143     Encounter Date: 04/10/24   ECG 12 Lead   Result Value    Ventricular Rate 64    Atrial Rate 64    OH Interval 72    QRS Duration 172    QT Interval 492    QTC Calculation(Bazett) 507    R Axis 3    T Axis 91    QRS Count 11    Q Onset 197    T Offset 443    QTC Fredericia 502    Narrative    AV sequential or dual chamber electronic pacemaker  When compared with ECG of 25-APR-2022  18:02,  Vent. rate has increased BY   4 BPM  Confirmed by Raymundo Purdy ([id number]) on 4/14/2024 8:15:35 PM        Anesthesia Plan    History of general anesthesia?: yes  History of complications of general anesthesia?: no    ASA 3     general     The patient is not a current smoker.    intravenous induction   Anesthetic plan and risks discussed with patient.    Plan discussed with CRNA.

## 2024-04-25 ENCOUNTER — LAB (OUTPATIENT)
Dept: LAB | Facility: LAB | Age: 79
End: 2024-04-25
Payer: MEDICARE

## 2024-04-25 DIAGNOSIS — Z79.01 LONG TERM (CURRENT) USE OF ANTICOAGULANTS: Primary | ICD-10-CM

## 2024-04-25 LAB
INR PPP: 2 (ref 0.9–1.1)
LAB AP ASR DISCLAIMER: NORMAL
LABORATORY COMMENT REPORT: NORMAL
PATH REPORT.FINAL DX SPEC: NORMAL
PATH REPORT.GROSS SPEC: NORMAL
PATH REPORT.RELEVANT HX SPEC: NORMAL
PATH REPORT.TOTAL CANCER: NORMAL
PROTHROMBIN TIME: 23.1 SECONDS (ref 9.8–12.8)

## 2024-04-25 PROCEDURE — 85610 PROTHROMBIN TIME: CPT

## 2024-04-25 PROCEDURE — 36415 COLL VENOUS BLD VENIPUNCTURE: CPT

## 2024-04-26 ENCOUNTER — ANTICOAGULATION - WARFARIN VISIT (OUTPATIENT)
Dept: CARDIOLOGY | Facility: CLINIC | Age: 79
End: 2024-04-26
Payer: MEDICARE

## 2024-04-26 ENCOUNTER — PATIENT MESSAGE (OUTPATIENT)
Dept: UROLOGY | Facility: CLINIC | Age: 79
End: 2024-04-26
Payer: MEDICARE

## 2024-04-26 DIAGNOSIS — N28.89 RENAL MASS, LEFT: Primary | ICD-10-CM

## 2024-04-26 NOTE — PROGRESS NOTES
Called and spoke with Mando Tineocynthia regarding INR 2.0 with goal 2.5 - 3.5.    He recently underwent surgical procedure for which his coumadin was held and he was bridged with Lovenox.  His last Lovenox dose was yesterday as directed once his INR was greater than 1.9.      I have advised him to take Coumadin 7.5 mg today then resume current Coumadin dosing of Coumadin 7.5 mg on Tue and Thursdays along with coumadin 5 mg all other days.      Repeat INR next Monday 4/29/24.  Heidi Maggi verbalizes understanding.

## 2024-04-30 ENCOUNTER — LAB (OUTPATIENT)
Dept: LAB | Facility: LAB | Age: 79
End: 2024-04-30
Payer: MEDICARE

## 2024-04-30 ENCOUNTER — TELEPHONE (OUTPATIENT)
Dept: CARDIOLOGY | Facility: CLINIC | Age: 79
End: 2024-04-30

## 2024-04-30 DIAGNOSIS — R60.9 EDEMA, UNSPECIFIED TYPE: Primary | ICD-10-CM

## 2024-04-30 DIAGNOSIS — Z79.01 LONG TERM (CURRENT) USE OF ANTICOAGULANTS: ICD-10-CM

## 2024-04-30 LAB
INR PPP: 2 (ref 0.9–1.1)
PROTHROMBIN TIME: 23 SECONDS (ref 9.8–12.8)

## 2024-04-30 PROCEDURE — 85610 PROTHROMBIN TIME: CPT

## 2024-04-30 PROCEDURE — 36415 COLL VENOUS BLD VENIPUNCTURE: CPT

## 2024-04-30 RX ORDER — FUROSEMIDE 20 MG/1
20 TABLET ORAL DAILY
Qty: 30 TABLET | Refills: 0 | Status: SHIPPED | OUTPATIENT
Start: 2024-04-30 | End: 2024-05-14

## 2024-04-30 NOTE — TELEPHONE ENCOUNTER
Patient called office requesting renewal on Furosemide 20 mg to new pharmacy [facility] in [place].  Medication is not listed, it was not listed at last appointment.  Patient states he takes 1 tablet every day.  Note to Dr. Ck Celis, F.A.C.C. for review.  Tiera Francis, [facility]

## 2024-04-30 NOTE — TELEPHONE ENCOUNTER
Noted.  Called to patient and advised #[phone] being sent to pharmacy and to keep appt 5/14/24 for further refills. Pt agreeable with plan.

## 2024-05-01 ENCOUNTER — ANTICOAGULATION - WARFARIN VISIT (OUTPATIENT)
Dept: CARDIOLOGY | Facility: CLINIC | Age: 79
End: 2024-05-01
Payer: MEDICARE

## 2024-05-01 NOTE — PROGRESS NOTES
Called Mando Tay regarding INR 2.0 with goal 2-3.   For today 5/1/2024 only take Coumadin 10 mg then change coumadin to coumadin 7.5 mg Tue, Thur and Sat and coumadin 5 mg all other days.  Repeat INR next Monday 5/6/2024.    No answer, left message to call office for recommendations.

## 2024-05-02 ENCOUNTER — TELEPHONE (OUTPATIENT)
Dept: CARDIOLOGY | Facility: CLINIC | Age: 79
End: 2024-05-02
Payer: MEDICARE

## 2024-05-02 NOTE — TELEPHONE ENCOUNTER
Patient left voice mail requesting call back with regards to message from ROBERT Petit, CNP.  Called patient who requested clarification on orders. He states he did take the 10 mg dose 5/1/24 as instructed but needed the rest clarified.   All orders repeated.  Patient verbalized understanding.  Tiera Francis, [facility]

## 2024-05-06 ENCOUNTER — DOCUMENTATION (OUTPATIENT)
Dept: CARDIOLOGY | Facility: CLINIC | Age: 79
End: 2024-05-06

## 2024-05-06 ENCOUNTER — LAB (OUTPATIENT)
Dept: LAB | Facility: LAB | Age: 79
End: 2024-05-06
Payer: MEDICARE

## 2024-05-06 DIAGNOSIS — Z79.01 LONG TERM (CURRENT) USE OF ANTICOAGULANTS: ICD-10-CM

## 2024-05-06 LAB
INR PPP: 3.4 (ref 0.9–1.1)
PROTHROMBIN TIME: 38.5 SECONDS (ref 9.8–12.8)

## 2024-05-06 PROCEDURE — 85610 PROTHROMBIN TIME: CPT

## 2024-05-06 PROCEDURE — 36415 COLL VENOUS BLD VENIPUNCTURE: CPT

## 2024-05-06 NOTE — PROGRESS NOTES
Called Mando Tay regarding INR,  I had called and left message last week regarding INR 2.0 and requested to call office.  Unfortunately I did not hear back from  him.  No answer today, I left message to call office regarding INR, he needs to have INR drawn soon due to non-therapeutic INR.

## 2024-05-07 ENCOUNTER — ANCILLARY PROCEDURE (OUTPATIENT)
Dept: CARDIOLOGY | Facility: HOSPITAL | Age: 79
End: 2024-05-07
Payer: MEDICARE

## 2024-05-07 ENCOUNTER — ANTICOAGULATION - WARFARIN VISIT (OUTPATIENT)
Dept: CARDIOLOGY | Facility: CLINIC | Age: 79
End: 2024-05-07
Payer: MEDICARE

## 2024-05-07 DIAGNOSIS — R06.02 SHORTNESS OF BREATH: ICD-10-CM

## 2024-05-07 DIAGNOSIS — I05.9 MITRAL VALVE DISEASE: ICD-10-CM

## 2024-05-07 PROCEDURE — 93306 TTE W/DOPPLER COMPLETE: CPT | Performed by: INTERNAL MEDICINE

## 2024-05-07 PROCEDURE — 93306 TTE W/DOPPLER COMPLETE: CPT

## 2024-05-07 NOTE — PROGRESS NOTES
Called and spoke with  Maggi regarding INR 3.4 with goal 2.5 -3.5.      Will change current coumadin dose to;  Coumadin 7.5 mg on Tue and Thurs,  and   Coumadin 5 mg all other days.       Repeat INR  2 weeks, approximately Monday 5/21/24.    Mr. Tay verbalizes understanding.

## 2024-05-09 LAB
AORTIC VALVE MEAN GRADIENT: 7 MMHG
AORTIC VALVE PEAK VELOCITY: 1.72 M/S
AV PEAK GRADIENT: 11.8 MMHG
AVA (PEAK VEL): 2.32 CM2
AVA (VTI): 2.31 CM2
EJECTION FRACTION APICAL 4 CHAMBER: 56.5
LEFT VENTRICLE INTERNAL DIMENSION DIASTOLE: 4.59 CM (ref 3.5–6)
LEFT VENTRICULAR OUTFLOW TRACT DIAMETER: 2 CM
LV EJECTION FRACTION BIPLANE: 58 %
MITRAL VALVE E/A RATIO: 1.42
MITRAL VALVE E/E' RATIO: 34.09
RIGHT VENTRICLE PEAK SYSTOLIC PRESSURE: 41.2 MMHG

## 2024-05-13 ENCOUNTER — APPOINTMENT (OUTPATIENT)
Dept: LAB | Facility: LAB | Age: 79
End: 2024-05-13
Payer: MEDICARE

## 2024-05-13 ENCOUNTER — HOSPITAL ENCOUNTER (OUTPATIENT)
Dept: CARDIOLOGY | Age: 79
Discharge: HOME | End: 2024-05-13
Payer: MEDICARE

## 2024-05-13 ENCOUNTER — LAB (OUTPATIENT)
Dept: LAB | Facility: LAB | Age: 79
End: 2024-05-13
Payer: MEDICARE

## 2024-05-13 DIAGNOSIS — Z79.84 DIABETES MELLITUS TREATED WITH ORAL MEDICATION (MULTI): ICD-10-CM

## 2024-05-13 DIAGNOSIS — E11.9 DIABETES MELLITUS TREATED WITH ORAL MEDICATION (MULTI): ICD-10-CM

## 2024-05-13 DIAGNOSIS — Z79.01 LONG TERM (CURRENT) USE OF ANTICOAGULANTS: ICD-10-CM

## 2024-05-13 DIAGNOSIS — I10 ESSENTIAL HYPERTENSION: ICD-10-CM

## 2024-05-13 LAB
ALBUMIN SERPL BCP-MCNC: 4.4 G/DL (ref 3.4–5)
ALP SERPL-CCNC: 66 U/L (ref 33–136)
ALT SERPL W P-5'-P-CCNC: 17 U/L (ref 10–52)
ANION GAP SERPL CALC-SCNC: 9 MMOL/L (ref 10–20)
AST SERPL W P-5'-P-CCNC: 19 U/L (ref 9–39)
BILIRUB SERPL-MCNC: 0.9 MG/DL (ref 0–1.2)
BUN SERPL-MCNC: 16 MG/DL (ref 6–23)
CALCIUM SERPL-MCNC: 9.3 MG/DL (ref 8.6–10.3)
CHLORIDE SERPL-SCNC: 103 MMOL/L (ref 98–107)
CO2 SERPL-SCNC: 31 MMOL/L (ref 21–32)
CREAT SERPL-MCNC: 1.06 MG/DL (ref 0.5–1.3)
EGFRCR SERPLBLD CKD-EPI 2021: 71 ML/MIN/1.73M*2
EST. AVERAGE GLUCOSE BLD GHB EST-MCNC: 151 MG/DL
GLUCOSE SERPL-MCNC: 119 MG/DL (ref 74–99)
HBA1C MFR BLD: 6.9 %
INR PPP: 3.1 (ref 0.9–1.1)
POTASSIUM SERPL-SCNC: 4.3 MMOL/L (ref 3.5–5.3)
PROT SERPL-MCNC: 6.9 G/DL (ref 6.4–8.2)
PROTHROMBIN TIME: 35.2 SECONDS (ref 9.8–12.8)
SODIUM SERPL-SCNC: 139 MMOL/L (ref 136–145)

## 2024-05-13 PROCEDURE — 36415 COLL VENOUS BLD VENIPUNCTURE: CPT

## 2024-05-13 PROCEDURE — 93281 PM DEVICE PROGR EVAL MULTI: CPT

## 2024-05-13 PROCEDURE — 93280 PM DEVICE PROGR EVAL DUAL: CPT

## 2024-05-13 PROCEDURE — 85610 PROTHROMBIN TIME: CPT

## 2024-05-13 PROCEDURE — 80053 COMPREHEN METABOLIC PANEL: CPT

## 2024-05-13 PROCEDURE — 83036 HEMOGLOBIN GLYCOSYLATED A1C: CPT

## 2024-05-14 ENCOUNTER — ANTICOAGULATION - WARFARIN VISIT (OUTPATIENT)
Dept: CARDIOLOGY | Facility: CLINIC | Age: 79
End: 2024-05-14

## 2024-05-14 ENCOUNTER — OFFICE VISIT (OUTPATIENT)
Dept: CARDIOLOGY | Facility: CLINIC | Age: 79
End: 2024-05-14
Payer: MEDICARE

## 2024-05-14 VITALS
SYSTOLIC BLOOD PRESSURE: 114 MMHG | WEIGHT: 219 LBS | HEIGHT: 70 IN | BODY MASS INDEX: 31.35 KG/M2 | DIASTOLIC BLOOD PRESSURE: 80 MMHG | HEART RATE: 77 BPM

## 2024-05-14 DIAGNOSIS — Z95.0 PRESENCE OF CARDIAC PACEMAKER: ICD-10-CM

## 2024-05-14 DIAGNOSIS — I50.32 CHRONIC DIASTOLIC CONGESTIVE HEART FAILURE, NYHA CLASS 2 (MULTI): ICD-10-CM

## 2024-05-14 DIAGNOSIS — I10 PRIMARY HYPERTENSION: ICD-10-CM

## 2024-05-14 PROCEDURE — 3079F DIAST BP 80-89 MM HG: CPT | Performed by: INTERNAL MEDICINE

## 2024-05-14 PROCEDURE — 1159F MED LIST DOCD IN RCRD: CPT | Performed by: INTERNAL MEDICINE

## 2024-05-14 PROCEDURE — 3074F SYST BP LT 130 MM HG: CPT | Performed by: INTERNAL MEDICINE

## 2024-05-14 PROCEDURE — 99214 OFFICE O/P EST MOD 30 MIN: CPT | Performed by: INTERNAL MEDICINE

## 2024-05-14 PROCEDURE — 1160F RVW MEDS BY RX/DR IN RCRD: CPT | Performed by: INTERNAL MEDICINE

## 2024-05-14 RX ORDER — FUROSEMIDE 20 MG/1
TABLET ORAL
Qty: 135 TABLET | Refills: 1 | Status: SHIPPED | OUTPATIENT
Start: 2024-05-14

## 2024-05-14 NOTE — PROGRESS NOTES
Patient:  Mando Tay  YOB: 1945  MRN: [id number]       Impression/Plan:     Diagnoses and all orders for this visit:  Primary hypertension  -     Currently well-controlled no adverse effect of the current medical regimen    Chronic diastolic congestive heart failure, NYHA class 2 (Multi)  -    He does have a component of restrictive and mildly obstructive pulmonary disease  -    He may need a repeat referral to pulmonary medicine.  -    Continue carvedilol secondary to underlying SVT.  Cannot afford SGLT2 inhibitors.  Therefore increase Lasix to 40 Monday Wednesday Friday continue 20 other days with BMP in 2 weeks and return visit in 2 months    Presence of cardiac pacemaker  -     Will plan to transition to  pacemaker clinic system.  He lives in [place] could consider [facility]. Will review on his follow up as just had device check.  -     Basic Metabolic Panel; Future      Chief Complaint/Active Symptoms:       Mando Tay is a 79 y.o. male who presents with  paroxysmal atrial flutter, previous mechanical mitral valve replacement for mitral valve prolapse and endocarditis in 1998, sick sinus syndrome with permanent pacemaker.  Coronary angiography at that time with trivial irregularities circumflex only.  He has longstanding hypertension and diabetes.  From medical standpoint also with history of restrictive lung disease, T-cell lymphoma diagnosed in 2015.  Does have moderately severe restrictive lung disease     FALL 2022 he had symptoms of shortness of breath and CHF felt related to atrial flutter. He was put on flecainide cardioverted initially felt better but then further deteriorated had episodes of pneumonia some unexplained shortness of breath with eventually was felt related to flecainide.. He felt better off the flecainide which was discontinued in April. There is a 5-10% incidence of sensation of dyspnea associated with flecainide. Then became ill again with orchiditis.  Subsequent CT scan demonstrated a right renal cyst and a left renal mass. He has since seen urology who feels it very likely could be malignancy but because of its size metastatic progression relatively unlikely and and serial imaging is planned.      Seen in pulmonary medicine office 10/17/2023 at which time the moderately severe restrictive lung disease described.  Recommended he follow-up with cardiology for control of A-fib and hypertension though there was no evidence of A-fib at that office visit but blood pressure was about 150 systolic at that time.      4/23/2024 underwent left renal mass biopsy and cryoablation no cardiac complications described    Pathology demonstrated oncocytic renal neoplasm  5/13/2024 INR 3.1 CMP normal except glucose 119   CBC/10/24 normal platelet 142    He has not had chest pain or palpitation no complications from a renal biopsy.  Oncocytic renal antiplasmin's are benign for the most part and he will be monitored.  He has no palpitation or lightheadedness no suggestion of recurrent atrial flutter but still gets short of breath easily.  He does take garbage cans out he will feel short of breath.  Ideally he would be on SGLT2 inhibitors but they are unaffordable for him.  Currently without bleeding issues    Pacemaker check May 2024 without underlying dysrhythmia normal functioning monitor currently at [facility]                   Review of Systems: Unremarkable except as noted above    Meds     Current Outpatient Medications   Medication Instructions    amLODIPine (NORVASC) 10 mg, oral, Daily    betamethasone valerate (Valisone) 0.1 % cream Topical, 2 times daily    carvedilol (COREG) 25 mg, oral, 2 times daily    cholecalciferol (VITAMIN D3) 25 mcg, oral, Daily    cyanocobalamin (VITAMIN B-12) 100 mcg, oral, Daily    enoxaparin (LOVENOX) 100 mg, subcutaneous, Every 12 hours    ezetimibe (ZETIA) 10 mg, oral, Nightly    fluticasone (Flonase) 50 mcg/actuation nasal spray  1 spray, Each Nostril, Daily, As needed    furosemide (LASIX) 20 mg, oral, Daily    melatonin 5 mg capsule 1 capsule, oral, Nightly PRN    metFORMIN (Glucophage) 500 mg tablet 1 tablet, oral, 2 times daily (morning and late afternoon)    pravastatin (PRAVACHOL) 20 mg, oral, Daily    sertraline (ZOLOFT) 100 mg, oral, Daily    tamsulosin (FLOMAX) 0.8 mg, oral, Daily    valsartan (DIOVAN) 160 mg, oral, 2 times daily    warfarin (Coumadin) 5 mg tablet As directed to maintain therapeutic INR.  Number of tablets equals 90 day supply.  (Taking 7.5 mg Tues and Thurs, 5 mg all other days of the week)        Allergies     Allergies   Allergen Reactions    Atorvastatin Myalgia    Flecainide Unknown    Rosuvastatin Myalgia    Statins-Hmg-Coa Reductase Inhibitors Unknown     Statins do not work    Sulfa (Sulfonamide Antibiotics) Unknown    Procainamide Rash         Annotated Problems     Specialty Problems          Cardiology Problems    History of mitral valve replacement     1998 [employer] mechanical mitral valve replacement         Presence of cardiac pacemaker      2/18/15 generator change to Medtronic Adapta L       10/04 Generator change to Medtronic Laie DR [id number]      1998 first device (pacesetter) placed due to CHB after MVR surgery.         Mitral valve disease     · 12/7/2021 echo LVEF 60%. LVH. RVSP 44 mm. 2+ AI unchanged. Normal function      mechanical mitral valve       History of mitral valve prolapse with mitral valve replacement mechanical [employer]        valve 1998 secondary to severe MR and endocarditis   History of mitral valve prolapse with mitral valve replacement mechanical [employer]      valve 1998 secondary to severe MR and endocarditis      Late 1997 MI- Felt due to emboli from mitral valve vegetation.         Primary hypertension    Atypical atrial flutter (Multi)    Anticoagulant long-term use    Hyperlipidemia      · 1997 trivial coronary irreg in cx. normal LV, severe MR w/subsequent MVR.           Paroxysmal atrial fibrillation (Multi)    Paroxysmal atrial flutter (Multi)    Sick sinus syndrome (Multi)    Chronic diastolic congestive heart failure, NYHA class 2 (Multi)        Problem List     Patient Active Problem List    Diagnosis Date Noted    Pre-operative clearance 04/04/2024    Chronic diastolic congestive heart failure, NYHA class 2 (Multi) 10/30/2023    Medication dose changed 10/30/2023    Adrenal nodule (Multi) 10/01/2023    Renal mass, left 10/01/2023    Arthritis 10/01/2023    Atypical depressive disorder 10/01/2023    Bilateral dry eyes 10/01/2023    BPPV (benign paroxysmal positional vertigo) 10/01/2023    Combined form of age-related cataract, both eyes 10/01/2023    Cyst of testis 10/01/2023    Dysphagia 10/01/2023    Edema 10/01/2023    Fatigue 10/01/2023    Hydrocele, bilateral 10/01/2023    Hyperlipidemia 10/01/2023    Hyperopia with presbyopia 10/01/2023    MGD (meibomian gland disease) 10/01/2023    Ocular migraine 10/01/2023    Orchitis and epididymitis 10/01/2023    Paroxysmal atrial fibrillation (Multi) 10/01/2023    Retinal scar of right eye 10/01/2023    Shortness of breath 10/01/2023    Sick sinus syndrome (Multi) 10/01/2023    Superficial basal cell carcinoma of skin of left shoulder 10/01/2023    Vestibulopathy 10/01/2023    Vitamin B 12 deficiency 10/01/2023    Vitamin D deficiency 10/01/2023    Hypoxia 10/01/2023    Anticoagulant long-term use 10/01/2023    Bilateral sensorineural hearing loss 10/01/2023    Chronic nasal congestion 10/01/2023    Class 1 obesity with body mass index (BMI) of 32.0 to 32.9 in adult 10/01/2023    Degenerative cervical spinal stenosis 10/01/2023    Diabetes mellitus treated with oral medication (Multi) 10/01/2023    High risk medication use 10/01/2023    History of snoring 10/01/2023    Hypertrophy of inferior nasal turbinate 10/01/2023    Paroxysmal atrial flutter (Multi) 10/01/2023    Tinnitus 10/01/2023    Agent orange exposure 08/21/2023  "   Atypical atrial flutter (Multi) 08/21/2023    BPH with obstruction/lower urinary tract symptoms 08/21/2023    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    Erectile dysfunction 08/21/2023    Major depressive disorder with single episode, in full remission (CMS-HCC) 08/21/2023    Acute bronchitis due to other specified organisms 08/21/2023    Hemangioma of skin and subcutaneous tissue 06/20/2023    Actinic keratosis 06/20/2023    Other seborrheic keratosis 06/20/2023    Melanocytic nevi of scalp and neck 06/20/2023    Melanocytic nevi of trunk 06/20/2023    Melanocytic nevi of unspecified lower limb, including hip 06/20/2023    Melanocytic nevi of unspecified part of face 06/20/2023    Melanocytic nevi of unspecified upper limb, including shoulder 06/20/2023    Neoplasm of uncertain behavior of skin 06/20/2023    Other hypertrophic disorders of the skin 06/20/2023    Other melanin hyperpigmentation 06/20/2023    Personal history of other malignant neoplasm of skin 06/20/2023    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    Viral wart, unspecified 06/20/2023    Lumbar stenosis with neurogenic claudication 03/17/2020    Myalgia, unspecified site 09/03/2019    Radial styloid tenosynovitis 06/13/2019    RAMSES (obstructive sleep apnea) 02/27/2018    Primary hypertension 02/27/2018    Mitral valve disease 02/27/2018    Presence of cardiac pacemaker 01/24/2018    History of mitral valve replacement 01/24/2018    Cervical spondylosis without myelopathy 02/02/2016    Chondromalacia of patella 01/19/2016    Primary localized osteoarthrosis of shoulder region 01/19/2016    Adhesive capsulitis of shoulder 01/19/2016       Objective:     Vitals:    05/14/24 1337   BP: 114/80   BP Location: Left arm   Patient Position: Sitting   Pulse: 77   Weight: 99.3 kg (219 lb)   Height: 1.778 m (5' 10\")      Wt Readings from Last 4 Encounters:   05/14/24 99.3 kg (219 lb)   04/23/24 95.3 kg (210 lb)   04/10/24 99.8 kg (220 lb 0.3 oz) "   04/04/24 98.2 kg (216 lb 6.4 oz)           LAB:     Lab Results   Component Value Date    WBC 5.5 04/10/2024    HGB 13.6 04/10/2024    HCT 41.2 04/10/2024     (L) 04/10/2024    CHOL 152 01/23/2024    TRIG 75 01/23/2024    HDL 50.9 01/23/2024    ALT 17 05/13/2024    AST 19 05/13/2024     05/13/2024    K 4.3 05/13/2024     05/13/2024    CREATININE 1.06 05/13/2024    BUN 16 05/13/2024    CO2 31 05/13/2024    TSH 1.34 04/04/2022    PSA 4.59 (H) 02/01/2023    INR 3.1 (H) 05/13/2024    HGBA1C 6.9 (H) 05/13/2024       Diagnostic Studies:     US guided percutaneous biopsy renal left    Result Date: 4/23/2024  Interpreted By:  Froilan Reina, STUDY: CT GUIDED RENAL LEFT CRYOABLATION; US GUIDED CRYOABLATION RENAL LEFT; US GUIDED PERCUTANEOUS BIOPSY RENAL LEFT; 4/23/2024 3:50 pm; 4/23/2024 3:38 pm; 4/23/2024 3:22 pm   INDICATION: Signs/Symptoms:left renal cryoablation; Signs/Symptoms:left renal biopsy during cryoablation.   COMPARISON: CT kidney 03/04/2024.   ACCESSION NUMBER(S): [id number]; [id number]; [id number]   ORDERING CLINICIAN: NOLAN TARIQ   TECHNIQUE: : Froilan Reina MD   CONSENT: The patient was informed of the nature of the proposed procedure. The purposes, alternatives, risks, and benefits were explained and discussed. All questions were answered and consent was obtained.   RADIATION EXPOSURE: Dose: Total exam DLP 1753 mGy cm   SEDATION: General anesthesia   MEDICATION/CONTRAST: Ceftriaxone 1 g IV 2 mL Omnipaque 350 in 150 mL normal saline for hydro dissection   TIME OUT: A time out was performed immediately prior to the procedure start with interventional team, correctly identifying the patient using multiple identifiers and ensuring the appropriate procedure and anatomy (including laterality, if applicable) were identified. The procedure consent form and all relevant laboratory and imaging tests were reviewed. The need for antibiotic administration or patient or  procedure specific safety precautions or equipment was reviewed.   COMPLICATIONS: None immediate     FINDINGS: General anesthesia was induced and the patient was then positioned prone oblique on the CT scan table. An initial  CT was performed, demonstrating the left mostly exophytic tumor in the superior pole. The skin was marked, prepped, and draped in a sterile fashion.   Next, the right kidney was evaluated sonographically, demonstrating a lesion corresponding to the  CT scan and prior imaging. Under direct sonographic guidance, the skin and intervening tissues were anesthetized with lidocaine. Then, under direct sonographic guidance, a 17 gauge coaxial needle was advanced to the tumor margin, without puncture of the lesion. The inner stylet was removed and an 18 gauge biopsy sample was obtained and sent in formalin for surgical pathology.   Using a combination of CT and ultrasound, 2 cryoablation antennas we sequentially advanced through the tumor with spacing between 5 and 10 mm of the tips. There is air-filled colon and the pancreatic tail within the general vicinity of the ablation zone. For safety purposes, a 5 Czech Yueh catheter was advanced to the margin of the tumor between the tumor and colon. Proximally 150 mL of very dilute contrast was then injected via the Yueh catheter with intermittent imaging showing an increased distance between the ablation probes and the colon and pancreatic tail. Once a sufficient distance was created, cryoablation of the tumor was performed with active freezing of 10 minutes followed by a 5 minute thaw and 8 additional minutes of freeze time. Intermittent CT of the kidney was performed to evaluate the ice formation and ensure appropriate coverage of the tumor without damage to collateral structures. Once the needles had cooled sufficiently, they were withdrawn and disposed of. A sterile dressing was applied.   A final CT of the upper abdomen without contrast was  obtained and showed no evidence of immediate complication.       Ultrasound and CT-guided left kidney mass biopsy and cryoablation as above   Signed by: Froilan Reina 4/23/2024 4:11 PM Dictation workstation:   [id number]    CT guided renal left cryoablation    Result Date: 4/23/2024  Interpreted By:  Froilan Reina, STUDY: CT GUIDED RENAL LEFT CRYOABLATION; US GUIDED CRYOABLATION RENAL LEFT; US GUIDED PERCUTANEOUS BIOPSY RENAL LEFT; 4/23/2024 3:50 pm; 4/23/2024 3:38 pm; 4/23/2024 3:22 pm   INDICATION: Signs/Symptoms:left renal cryoablation; Signs/Symptoms:left renal biopsy during cryoablation.   COMPARISON: CT kidney 03/04/2024.   ACCESSION NUMBER(S): [id number]; [id number]; [id number]   ORDERING CLINICIAN: NOLAN TARIQ   TECHNIQUE: : Froilan Reina MD   CONSENT: The patient was informed of the nature of the proposed procedure. The purposes, alternatives, risks, and benefits were explained and discussed. All questions were answered and consent was obtained.   RADIATION EXPOSURE: Dose: Total exam DLP 1753 mGy cm   SEDATION: General anesthesia   MEDICATION/CONTRAST: Ceftriaxone 1 g IV 2 mL Omnipaque 350 in 150 mL normal saline for hydro dissection   TIME OUT: A time out was performed immediately prior to the procedure start with interventional team, correctly identifying the patient using multiple identifiers and ensuring the appropriate procedure and anatomy (including laterality, if applicable) were identified. The procedure consent form and all relevant laboratory and imaging tests were reviewed. The need for antibiotic administration or patient or procedure specific safety precautions or equipment was reviewed.   COMPLICATIONS: None immediate     FINDINGS: General anesthesia was induced and the patient was then positioned prone oblique on the CT scan table. An initial  CT was performed, demonstrating the left mostly exophytic tumor in the superior pole. The skin was marked,  prepped, and draped in a sterile fashion.   Next, the right kidney was evaluated sonographically, demonstrating a lesion corresponding to the  CT scan and prior imaging. Under direct sonographic guidance, the skin and intervening tissues were anesthetized with lidocaine. Then, under direct sonographic guidance, a 17 gauge coaxial needle was advanced to the tumor margin, without puncture of the lesion. The inner stylet was removed and an 18 gauge biopsy sample was obtained and sent in formalin for surgical pathology.   Using a combination of CT and ultrasound, 2 cryoablation antennas we sequentially advanced through the tumor with spacing between 5 and 10 mm of the tips. There is air-filled colon and the pancreatic tail within the general vicinity of the ablation zone. For safety purposes, a 5 Algerian Yueh catheter was advanced to the margin of the tumor between the tumor and colon. Proximally 150 mL of very dilute contrast was then injected via the Yueh catheter with intermittent imaging showing an increased distance between the ablation probes and the colon and pancreatic tail. Once a sufficient distance was created, cryoablation of the tumor was performed with active freezing of 10 minutes followed by a 5 minute thaw and 8 additional minutes of freeze time. Intermittent CT of the kidney was performed to evaluate the ice formation and ensure appropriate coverage of the tumor without damage to collateral structures. Once the needles had cooled sufficiently, they were withdrawn and disposed of. A sterile dressing was applied.   A final CT of the upper abdomen without contrast was obtained and showed no evidence of immediate complication.       Ultrasound and CT-guided left kidney mass biopsy and cryoablation as above   Signed by: Froilan Reina 4/23/2024 4:11 PM Dictation workstation:   [id number]    US guided cryoablation renal left    Result Date: 4/23/2024  Interpreted By:  Froilan eRina, STUDY: CT  GUIDED RENAL LEFT CRYOABLATION; US GUIDED CRYOABLATION RENAL LEFT; US GUIDED PERCUTANEOUS BIOPSY RENAL LEFT; 4/23/2024 3:50 pm; 4/23/2024 3:38 pm; 4/23/2024 3:22 pm   INDICATION: Signs/Symptoms:left renal cryoablation; Signs/Symptoms:left renal biopsy during cryoablation.   COMPARISON: CT kidney 03/04/2024.   ACCESSION NUMBER(S): [id number]; [id number]; [id number]   ORDERING CLINICIAN: NOLAN TARIQ   TECHNIQUE: : Froilan Reina MD   CONSENT: The patient was informed of the nature of the proposed procedure. The purposes, alternatives, risks, and benefits were explained and discussed. All questions were answered and consent was obtained.   RADIATION EXPOSURE: Dose: Total exam DLP 1753 mGy cm   SEDATION: General anesthesia   MEDICATION/CONTRAST: Ceftriaxone 1 g IV 2 mL Omnipaque 350 in 150 mL normal saline for hydro dissection   TIME OUT: A time out was performed immediately prior to the procedure start with interventional team, correctly identifying the patient using multiple identifiers and ensuring the appropriate procedure and anatomy (including laterality, if applicable) were identified. The procedure consent form and all relevant laboratory and imaging tests were reviewed. The need for antibiotic administration or patient or procedure specific safety precautions or equipment was reviewed.   COMPLICATIONS: None immediate     FINDINGS: General anesthesia was induced and the patient was then positioned prone oblique on the CT scan table. An initial  CT was performed, demonstrating the left mostly exophytic tumor in the superior pole. The skin was marked, prepped, and draped in a sterile fashion.   Next, the right kidney was evaluated sonographically, demonstrating a lesion corresponding to the  CT scan and prior imaging. Under direct sonographic guidance, the skin and intervening tissues were anesthetized with lidocaine. Then, under direct sonographic guidance, a 17 gauge coaxial needle  was advanced to the tumor margin, without puncture of the lesion. The inner stylet was removed and an 18 gauge biopsy sample was obtained and sent in formalin for surgical pathology.   Using a combination of CT and ultrasound, 2 cryoablation antennas we sequentially advanced through the tumor with spacing between 5 and 10 mm of the tips. There is air-filled colon and the pancreatic tail within the general vicinity of the ablation zone. For safety purposes, a 5 Maltese Yueh catheter was advanced to the margin of the tumor between the tumor and colon. Proximally 150 mL of very dilute contrast was then injected via the Yueh catheter with intermittent imaging showing an increased distance between the ablation probes and the colon and pancreatic tail. Once a sufficient distance was created, cryoablation of the tumor was performed with active freezing of 10 minutes followed by a 5 minute thaw and 8 additional minutes of freeze time. Intermittent CT of the kidney was performed to evaluate the ice formation and ensure appropriate coverage of the tumor without damage to collateral structures. Once the needles had cooled sufficiently, they were withdrawn and disposed of. A sterile dressing was applied.   A final CT of the upper abdomen without contrast was obtained and showed no evidence of immediate complication.       Ultrasound and CT-guided left kidney mass biopsy and cryoablation as above   Signed by: Froilan Reina 4/23/2024 4:11 PM Dictation workstation:   [id number]        Radiology:     No orders to display       Physical Exam     General Appearance: alert and oriented to person, place and time, in no acute distress  Cardiovascular: normal rate, regular rhythm, normal S1 and S2, no murmurs, rubs, clicks.  S4 gallops,  no JVD  Pulmonary/Chest: clear to auscultation bilaterally- no wheezes, rales or rhonchi, normal air movement, no respiratory distress  Abdomen: soft, non-tender, non-distended, normal bowel sounds,  no masses   Extremities: no cyanosis, clubbing. Trace edema  Skin: warm and dry, no rash or erythema  Eyes: EOMI  Neck: supple and non-tender without mass, no thyromegaly   Neurological: alert, oriented, normal speech, no focal findings or movement disorder noted  Vascular: Pulses 2+          Scribe Attestation  By signing my name below, I, Tiera Francis, ARTHUR   , Scribe   attest that this documentation has been prepared under the direction and in the presence of Ck Celis MD.

## 2024-05-14 NOTE — PATIENT INSTRUCTIONS
INCREASE FUROSEMIDE 20 MG TO 2 TABLETS TOGETHER (40 MG) EVERY MON, WEDS AND FRI, 1 TABLET (20 MG) ALL OTHER DAYS OF THE WEEK    NON FASTING LABS TO BE DONE IN 2 WEEKS 5/28/24     Please bring all medicines, vitamins, and herbal supplements with you in original bottles to every appointment!    Prescriptions will not be filled unless you are compliant with your follow up appointments or have a follow up appointment scheduled as per instruction of your physician. Refills should be requested at the time of your visit.

## 2024-05-14 NOTE — PROGRESS NOTES
Called and spoke with Mando Tay regarding INR 3.1 with goal of 2.5 - 3.5.  Will continue current Coumadin dosing and repeat INR in 3 weeks approximately 6/4/2024.  Mr. Tay verbalizes understanding.

## 2024-05-30 ENCOUNTER — APPOINTMENT (OUTPATIENT)
Dept: PRIMARY CARE | Facility: CLINIC | Age: 79
End: 2024-05-30
Payer: MEDICARE

## 2024-05-30 ENCOUNTER — LAB (OUTPATIENT)
Dept: LAB | Facility: LAB | Age: 79
End: 2024-05-30
Payer: MEDICARE

## 2024-05-30 DIAGNOSIS — I10 PRIMARY HYPERTENSION: ICD-10-CM

## 2024-05-30 DIAGNOSIS — I50.32 CHRONIC DIASTOLIC CONGESTIVE HEART FAILURE, NYHA CLASS 2 (MULTI): ICD-10-CM

## 2024-05-30 DIAGNOSIS — Z79.01 LONG TERM (CURRENT) USE OF ANTICOAGULANTS: ICD-10-CM

## 2024-05-30 DIAGNOSIS — Z95.0 PRESENCE OF CARDIAC PACEMAKER: ICD-10-CM

## 2024-05-30 LAB
ANION GAP SERPL CALC-SCNC: 10 MMOL/L (ref 10–20)
BUN SERPL-MCNC: 23 MG/DL (ref 6–23)
CALCIUM SERPL-MCNC: 9.6 MG/DL (ref 8.6–10.3)
CHLORIDE SERPL-SCNC: 103 MMOL/L (ref 98–107)
CO2 SERPL-SCNC: 32 MMOL/L (ref 21–32)
CREAT SERPL-MCNC: 1.23 MG/DL (ref 0.5–1.3)
EGFRCR SERPLBLD CKD-EPI 2021: 60 ML/MIN/1.73M*2
GLUCOSE SERPL-MCNC: 115 MG/DL (ref 74–99)
INR PPP: 2.9 (ref 0.9–1.1)
POTASSIUM SERPL-SCNC: 4.1 MMOL/L (ref 3.5–5.3)
PROTHROMBIN TIME: 32.7 SECONDS (ref 9.8–12.8)
SODIUM SERPL-SCNC: 141 MMOL/L (ref 136–145)

## 2024-05-30 PROCEDURE — 85610 PROTHROMBIN TIME: CPT

## 2024-05-30 PROCEDURE — 80048 BASIC METABOLIC PNL TOTAL CA: CPT

## 2024-05-30 PROCEDURE — 36415 COLL VENOUS BLD VENIPUNCTURE: CPT

## 2024-06-03 ENCOUNTER — ANTICOAGULATION - WARFARIN VISIT (OUTPATIENT)
Dept: CARDIOLOGY | Facility: CLINIC | Age: 79
End: 2024-06-03
Payer: MEDICARE

## 2024-06-03 NOTE — PROGRESS NOTES
Called and spoke with Mando Tay regarding INR 2.9.  Will continue current coumadin dosing and recheck INR in 4 weeks approximately 7/1/2024.    Mr. Tay verbalizes understanding.

## 2024-06-06 ENCOUNTER — OFFICE VISIT (OUTPATIENT)
Dept: PRIMARY CARE | Facility: CLINIC | Age: 79
End: 2024-06-06
Payer: MEDICARE

## 2024-06-06 VITALS
SYSTOLIC BLOOD PRESSURE: 128 MMHG | WEIGHT: 216.2 LBS | TEMPERATURE: 97.4 F | HEIGHT: 70 IN | BODY MASS INDEX: 30.95 KG/M2 | DIASTOLIC BLOOD PRESSURE: 73 MMHG | HEART RATE: 81 BPM

## 2024-06-06 DIAGNOSIS — Z78.9 NEVER SMOKED CIGARETTES: ICD-10-CM

## 2024-06-06 DIAGNOSIS — E08.65 DIABETES MELLITUS DUE TO UNDERLYING CONDITION WITH HYPERGLYCEMIA, UNSPECIFIED WHETHER LONG TERM INSULIN USE (MULTI): ICD-10-CM

## 2024-06-06 DIAGNOSIS — E66.9 CLASS 1 OBESITY WITH BODY MASS INDEX (BMI) OF 31.0 TO 31.9 IN ADULT, UNSPECIFIED OBESITY TYPE, UNSPECIFIED WHETHER SERIOUS COMORBIDITY PRESENT: ICD-10-CM

## 2024-06-06 DIAGNOSIS — E53.8 VITAMIN B 12 DEFICIENCY: Primary | ICD-10-CM

## 2024-06-06 DIAGNOSIS — E55.9 VITAMIN D DEFICIENCY: ICD-10-CM

## 2024-06-06 DIAGNOSIS — E78.5 HYPERLIPIDEMIA, UNSPECIFIED HYPERLIPIDEMIA TYPE: ICD-10-CM

## 2024-06-06 DIAGNOSIS — Z00.00 MEDICARE ANNUAL WELLNESS VISIT, SUBSEQUENT: ICD-10-CM

## 2024-06-06 DIAGNOSIS — I10 PRIMARY HYPERTENSION: ICD-10-CM

## 2024-06-06 PROCEDURE — G0439 PPPS, SUBSEQ VISIT: HCPCS | Performed by: FAMILY MEDICINE

## 2024-06-06 PROCEDURE — 1036F TOBACCO NON-USER: CPT | Performed by: FAMILY MEDICINE

## 2024-06-06 PROCEDURE — 1170F FXNL STATUS ASSESSED: CPT | Performed by: FAMILY MEDICINE

## 2024-06-06 PROCEDURE — 1160F RVW MEDS BY RX/DR IN RCRD: CPT | Performed by: FAMILY MEDICINE

## 2024-06-06 PROCEDURE — 3074F SYST BP LT 130 MM HG: CPT | Performed by: FAMILY MEDICINE

## 2024-06-06 PROCEDURE — 3078F DIAST BP <80 MM HG: CPT | Performed by: FAMILY MEDICINE

## 2024-06-06 PROCEDURE — 1159F MED LIST DOCD IN RCRD: CPT | Performed by: FAMILY MEDICINE

## 2024-06-06 RX ORDER — METFORMIN HYDROCHLORIDE 500 MG/1
500 TABLET ORAL
Qty: 180 TABLET | Refills: 1 | Status: SHIPPED | OUTPATIENT
Start: 2024-06-06

## 2024-06-06 ASSESSMENT — ACTIVITIES OF DAILY LIVING (ADL)
BATHING: INDEPENDENT
DOING_HOUSEWORK: INDEPENDENT
TAKING_MEDICATION: INDEPENDENT
MANAGING_FINANCES: INDEPENDENT
GROCERY_SHOPPING: INDEPENDENT
DRESSING: INDEPENDENT

## 2024-06-06 ASSESSMENT — PATIENT HEALTH QUESTIONNAIRE - PHQ9
2. FEELING DOWN, DEPRESSED OR HOPELESS: NOT AT ALL
1. LITTLE INTEREST OR PLEASURE IN DOING THINGS: NOT AT ALL
SUM OF ALL RESPONSES TO PHQ9 QUESTIONS 1 AND 2: 0

## 2024-06-06 NOTE — PROGRESS NOTES
Juan Bhat is here for his annual wellness visit.  He states that he is feeling overall well at the present time.  He has no new complaints.  About a month ago he did undergo image guided biopsy and cryoablation of his left renal mass by Dr. Acharya of interventional radiology.  This was tolerated well without complication.  Pathology showed a renal onco cytoma..  Patient will be following up with his urologist regarding this.  He also has had close follow-up with his cardiologist Dr. Celis.  He continues on his meds noted.    Patient ID: Mando Tay is a 79 y.o. male who presents for Medicare Annual Wellness Visit Subsequent:    Problem List Items Addressed This Visit    None     Past Medical History:   Diagnosis Date    Acute myocardial infarction, unspecified (Multi) 11/17/2021    Acute myocardial infarction    Cutaneous T-cell lymphoma (Multi)     Depression     Diabetes mellitus (Multi)     Epididymitis 06/02/2022    Epididymitis, right    Essential (primary) hypertension 12/08/2022    Hypertension    Heart disease     Kidney tumor     Obesity, unspecified 04/04/2022    Class 1 obesity with body mass index (BMI) of 31.0 to 31.9 in adult    Obesity, unspecified 06/08/2022    Class 1 obesity with body mass index (BMI) of 30.0 to 30.9 in adult    Obesity, unspecified 01/10/2022    Class 1 obesity with body mass index (BMI) of 31.0 to 31.9 in adult    Obesity, unspecified 01/31/2022    Class 1 obesity with body mass index (BMI) of 31.0 to 31.9 in adult    Other abnormal findings in urine 05/09/2022    Dark urine    Other specified disorders of kidney and ureter     Bilateral renal masses    Other specified disorders of kidney and ureter 05/09/2022    Renal mass, right    Other specified disorders of the male genital organs 05/09/2022    Swelling of right half of scrotum    Other symptoms and signs involving the musculoskeletal system 11/17/2021    Leg fatigue    Personal history of other diseases of the  circulatory system 11/17/2021    History of mitral valve insufficiency    Personal history of other diseases of the respiratory system 12/09/2021    History of acute bronchitis    Personal history of other specified conditions 04/04/2022    History of shortness of breath    Personal history of other specified conditions     History of snoring    Personal history of pneumonia (recurrent) 01/31/2022    History of pneumonia    Prediabetes 06/18/2018    Borderline diabetes mellitus    Scrotal pain 05/09/2022    Acute pain in scrotum    Shortness of breath 12/09/2022    Shortness of breath on exertion    Sleep apnea     cpap    Unspecified cataract 02/22/2017    Cataract of right eye    Unspecified cataract 02/22/2017    Cataract of left eye    Unspecified osteoarthritis, unspecified site     Arthritis      Past Surgical History:   Procedure Laterality Date    CORONARY ARTERY BYPASS GRAFT  02/22/2017    Coronary Artery Surgery    HERNIA REPAIR  02/22/2017    Inguinal Hernia Repair    INSERT / REPLACE / REMOVE PACEMAKER      x3    OTHER SURGICAL HISTORY  11/17/2021    Surgery    OTHER SURGICAL HISTORY  11/17/2021    Cardioversion    OTHER SURGICAL HISTORY  12/20/2021    Mitral valve replacement    OTHER SURGICAL HISTORY  12/20/2021    Cardiac catheterization    RENAL MASS EXCISION Left 04/2024    tumor removed      Family History   Problem Relation Name Age of Onset    Other (Cardiac disorder) Mother      Cataracts Mother      Hypertension Mother      Other (Cardiac disorder) Father      Stomach cancer Father      Hypertension Father        Social History     Socioeconomic History    Marital status:      Spouse name: Not on file    Number of children: Not on file    Years of education: Not on file    Highest education level: Not on file   Occupational History    Not on file   Tobacco Use    Smoking status: Never    Smokeless tobacco: Never   Vaping Use    Vaping status: Never Used   Substance and Sexual Activity     Alcohol use: Not Currently     Comment: not for years    Drug use: Never    Sexual activity: Not Currently   Other Topics Concern    Not on file   Social History Narrative    Not on file     Social Determinants of Health     Financial Resource Strain: Not on file   Food Insecurity: Not on file   Transportation Needs: Not on file   Physical Activity: Not on file   Stress: Not on file   Social Connections: Not on file   Intimate Partner Violence: Not on file   Housing Stability: Not on file      Atorvastatin, Flecainide, Rosuvastatin, Statins-hmg-coa reductase inhibitors, Sulfa (sulfonamide antibiotics), and Procainamide   Current Outpatient Medications   Medication Sig Dispense Refill    amLODIPine (Norvasc) 10 mg tablet Take 1 tablet (10 mg) by mouth once daily. 90 tablet 1    betamethasone valerate (Valisone) 0.1 % cream Apply topically 2 times a day. 15 g 0    carvedilol (Coreg) 25 mg tablet Take 1 tablet (25 mg) by mouth 2 times a day. 180 tablet 1    cholecalciferol (Vitamin D3) 25 MCG (1000 UT) capsule Take 1 capsule (25 mcg) by mouth once daily.      cyanocobalamin (Vitamin B-12) 1,000 mcg tablet Take 100 mcg by mouth once daily.      ezetimibe (Zetia) 10 mg tablet Take 1 tablet (10 mg) by mouth once daily at bedtime. 90 tablet 1    fluticasone (Flonase) 50 mcg/actuation nasal spray Administer 1 spray into each nostril once daily. As needed      furosemide (Lasix) 20 mg tablet 2 TABLETS TOGETHER (40 MG) EVERY MON, WEDS AND FRI, 1 TABLET (20 MG) ALL OTHER DAYS OF THE WEEK 135 tablet 1    melatonin 5 mg capsule Take 1 capsule (5 mg) by mouth as needed at bedtime.      metFORMIN (Glucophage) 500 mg tablet Take 1 tablet (500 mg) by mouth 2 times daily (morning and late afternoon).      pravastatin (Pravachol) 20 mg tablet Take 1 tablet (20 mg) by mouth once daily. 90 tablet 1    sertraline (Zoloft) 100 mg tablet Take 1 tablet (100 mg) by mouth once daily. 90 tablet 1    tamsulosin (Flomax) 0.4 mg 24 hr capsule  Take 2 capsules (0.8 mg) by mouth once daily. 90 capsule 3    valsartan (Diovan) 160 mg tablet Take 1 tablet (160 mg) by mouth 2 times a day. 180 tablet 1    warfarin (Coumadin) 5 mg tablet As directed to maintain therapeutic INR.  Number of tablets equals 90 day supply.  (Taking 7.5 mg Tues and Thurs, 5 mg all other days of the week) 110 tablet 1     No current facility-administered medications for this visit.       Immunization History   Administered Date(s) Administered    Flu vaccine (IIV4), preservative free *Check age/dose* 10/08/2020    Flu vaccine, quadrivalent, high-dose, preservative free, age 65y+ (FLUZONE) 10/03/2023    Influenza, High Dose Seasonal, Preservative Free 11/30/2017, 10/16/2018, 10/17/2019    Influenza, Seasonal, Quadrivalent, Adjuvanted 11/22/2021, 11/07/2022    Influenza, Unspecified 10/01/2010, 01/01/2014, 02/01/2015, 10/01/2018, 11/01/2022    Influenza, live, intranasal, quadrivalent 11/22/2021    Influenza, seasonal, injectable 01/15/2014, 11/28/2018, 08/01/2019, 10/01/2020, 09/13/2021, 09/19/2022    Pfizer COVID-19 vaccine, Fall 2023, 12 years and older, (30mcg/0.3mL) 01/26/2024    Pfizer Gray Cap SARS-CoV-2 05/05/2022    Pfizer Purple Cap SARS-CoV-2 02/10/2021, 03/02/2021, 10/13/2021, 02/07/2022    Pneumococcal Conjugate PCV 7 1945    Pneumococcal conjugate vaccine, 13-valent (PREVNAR 13) 03/03/2017    Pneumococcal polysaccharide vaccine, 23-valent, age 2 years and older (PNEUMOVAX 23) 02/10/2014, 07/30/2019, 08/16/2021    Zoster vaccine, recombinant, adult (SHINGRIX) 06/19/2019, 08/19/2019    Zoster, Unspecified 07/01/2019, 08/31/2019        Review of Systems   Constitutional: Negative.    HENT: Negative.     Eyes: Negative.    Respiratory: Negative.     Cardiovascular: Negative.    Gastrointestinal: Negative.    Endocrine: Negative.    Genitourinary: Negative.    Musculoskeletal: Negative.    Skin: Negative.    Allergic/Immunologic: Negative.    Hematological: Negative.     Psychiatric/Behavioral: Negative.     All other systems reviewed and are negative.       Vitals:    06/06/24 1127   BP: 128/73   Pulse: 81   Temp: 36.3 °C (97.4 °F)     Vitals:    06/06/24 1127   Weight: 98.1 kg (216 lb 3.2 oz)      Physical Exam  Constitutional:       General: He is not in acute distress.     Appearance: Normal appearance.   Neck:      Vascular: No carotid bruit.   Cardiovascular:      Rate and Rhythm: Normal rate and regular rhythm.      Pulses: Normal pulses.      Heart sounds: Murmur (Regular S1-S2 with a 1/6 systolic ejection murmur at the left sternal border) heard.      No friction rub. No gallop.   Pulmonary:      Effort: Pulmonary effort is normal. No respiratory distress.      Breath sounds: Normal breath sounds. No wheezing or rales.   Abdominal:      General: Abdomen is flat. There is no distension.      Palpations: Abdomen is soft. There is no mass.      Tenderness: There is no abdominal tenderness. There is no guarding.   Musculoskeletal:      Cervical back: Neck supple.   Neurological:      General: No focal deficit present.      Mental Status: He is alert and oriented to person, place, and time. Mental status is at baseline.          ASSESSMENT/PLAN: Annual wellness visit    Diabetes mellitus type 2 with slight worsening.  A1c 6.9  continue metformin as noted.  We discussed the importance of watching his diabetic diet and getting regular exercise.  Recheck CMP A1c and urine for microalbumin in 3 to 4 months.    Hyperlipidemia.  Continue pravastatin and ezetimibe daily.  Check lipid profile in 3 to 4 months.    Hypertension stable.  Continue meds as noted    BPH with elevated PSA.  This is monitored by urology    Renal mass as noted.  Follow-up with urology as scheduled.    Restrictive lung disease.  Recommended patient follow-up with his pulmonologist for further evaluation and recommendations especially in light of his chronic shortness of breath.    History of vitamin B12  deficiency.  Check vitamin B12 level.    Follow-up in 3 to 4 months and call as needed     Scribe Attestation  By signing my name below, I, Juliann Tamayo LPN, Scribe   attest that this documentation has been prepared under the direction and in the presence of Juan West MD.

## 2024-06-09 ASSESSMENT — ENCOUNTER SYMPTOMS
EYES NEGATIVE: 1
HEMATOLOGIC/LYMPHATIC NEGATIVE: 1
ALLERGIC/IMMUNOLOGIC NEGATIVE: 1
CONSTITUTIONAL NEGATIVE: 1
PSYCHIATRIC NEGATIVE: 1
RESPIRATORY NEGATIVE: 1
GASTROINTESTINAL NEGATIVE: 1
CARDIOVASCULAR NEGATIVE: 1
ENDOCRINE NEGATIVE: 1
MUSCULOSKELETAL NEGATIVE: 1

## 2024-06-27 ENCOUNTER — ANTICOAGULATION - WARFARIN VISIT (OUTPATIENT)
Dept: CARDIOLOGY | Facility: CLINIC | Age: 79
End: 2024-06-27

## 2024-06-27 ENCOUNTER — LAB (OUTPATIENT)
Dept: LAB | Facility: LAB | Age: 79
End: 2024-06-27
Payer: MEDICARE

## 2024-06-27 DIAGNOSIS — E55.9 VITAMIN D DEFICIENCY: ICD-10-CM

## 2024-06-27 DIAGNOSIS — I10 PRIMARY HYPERTENSION: ICD-10-CM

## 2024-06-27 DIAGNOSIS — Z79.01 LONG TERM (CURRENT) USE OF ANTICOAGULANTS: ICD-10-CM

## 2024-06-27 DIAGNOSIS — E08.65 DIABETES MELLITUS DUE TO UNDERLYING CONDITION WITH HYPERGLYCEMIA, UNSPECIFIED WHETHER LONG TERM INSULIN USE (MULTI): ICD-10-CM

## 2024-06-27 DIAGNOSIS — E78.5 HYPERLIPIDEMIA, UNSPECIFIED HYPERLIPIDEMIA TYPE: ICD-10-CM

## 2024-06-27 DIAGNOSIS — E53.8 VITAMIN B 12 DEFICIENCY: ICD-10-CM

## 2024-06-27 DIAGNOSIS — E66.9 CLASS 1 OBESITY WITH BODY MASS INDEX (BMI) OF 31.0 TO 31.9 IN ADULT, UNSPECIFIED OBESITY TYPE, UNSPECIFIED WHETHER SERIOUS COMORBIDITY PRESENT: ICD-10-CM

## 2024-06-27 DIAGNOSIS — Z00.00 MEDICARE ANNUAL WELLNESS VISIT, SUBSEQUENT: ICD-10-CM

## 2024-06-27 LAB
25(OH)D3 SERPL-MCNC: 35 NG/ML (ref 30–100)
ALBUMIN SERPL BCP-MCNC: 4.2 G/DL (ref 3.4–5)
ALP SERPL-CCNC: 69 U/L (ref 33–136)
ALT SERPL W P-5'-P-CCNC: 16 U/L (ref 10–52)
ANION GAP SERPL CALC-SCNC: 10 MMOL/L (ref 10–20)
AST SERPL W P-5'-P-CCNC: 19 U/L (ref 9–39)
BILIRUB SERPL-MCNC: 0.8 MG/DL (ref 0–1.2)
BUN SERPL-MCNC: 21 MG/DL (ref 6–23)
CALCIUM SERPL-MCNC: 8.8 MG/DL (ref 8.6–10.3)
CHLORIDE SERPL-SCNC: 103 MMOL/L (ref 98–107)
CHOLEST SERPL-MCNC: 157 MG/DL (ref 0–199)
CHOLESTEROL/HDL RATIO: 3.7
CO2 SERPL-SCNC: 30 MMOL/L (ref 21–32)
CREAT SERPL-MCNC: 1.19 MG/DL (ref 0.5–1.3)
CREAT UR-MCNC: 356.6 MG/DL (ref 20–370)
EGFRCR SERPLBLD CKD-EPI 2021: 62 ML/MIN/1.73M*2
EST. AVERAGE GLUCOSE BLD GHB EST-MCNC: 151 MG/DL
GLUCOSE SERPL-MCNC: 192 MG/DL (ref 74–99)
HBA1C MFR BLD: 6.9 %
HDLC SERPL-MCNC: 42.7 MG/DL
INR PPP: 3.8 (ref 0.9–1.1)
LDLC SERPL CALC-MCNC: 86 MG/DL
MICROALBUMIN UR-MCNC: 189.4 MG/L
MICROALBUMIN/CREAT UR: 53.1 UG/MG CREAT
NON HDL CHOLESTEROL: 114 MG/DL (ref 0–149)
POTASSIUM SERPL-SCNC: 3.7 MMOL/L (ref 3.5–5.3)
PROT SERPL-MCNC: 6.6 G/DL (ref 6.4–8.2)
PROTHROMBIN TIME: 43.1 SECONDS (ref 9.8–12.8)
SODIUM SERPL-SCNC: 139 MMOL/L (ref 136–145)
TRIGL SERPL-MCNC: 144 MG/DL (ref 0–149)
VIT B12 SERPL-MCNC: 239 PG/ML (ref 211–911)
VLDL: 29 MG/DL (ref 0–40)

## 2024-06-27 PROCEDURE — 82570 ASSAY OF URINE CREATININE: CPT

## 2024-06-27 PROCEDURE — 82306 VITAMIN D 25 HYDROXY: CPT

## 2024-06-27 PROCEDURE — 36415 COLL VENOUS BLD VENIPUNCTURE: CPT

## 2024-06-27 PROCEDURE — 85610 PROTHROMBIN TIME: CPT

## 2024-06-27 PROCEDURE — 83036 HEMOGLOBIN GLYCOSYLATED A1C: CPT

## 2024-06-27 PROCEDURE — 82607 VITAMIN B-12: CPT

## 2024-06-27 PROCEDURE — 82043 UR ALBUMIN QUANTITATIVE: CPT

## 2024-06-27 PROCEDURE — 80053 COMPREHEN METABOLIC PANEL: CPT

## 2024-06-27 PROCEDURE — 80061 LIPID PANEL: CPT

## 2024-06-27 NOTE — PROGRESS NOTES
Called and spoke with Mando Tay regarding INR 3.8 with goal 2.5- 3.5.  Will change current coumadin dose to Coumadin 7.5 mg on Tue only and Coumadin 5 mg all other days.   He denies any changes to medications or diet.     Repeat INR  2 weeks, approximately Monday 7/10/2024    Mando verbalizes understanding.

## 2024-07-10 ENCOUNTER — HOSPITAL ENCOUNTER (OUTPATIENT)
Dept: RADIOLOGY | Facility: CLINIC | Age: 79
Discharge: HOME | End: 2024-07-10
Payer: MEDICARE

## 2024-07-10 ENCOUNTER — LAB (OUTPATIENT)
Dept: LAB | Facility: LAB | Age: 79
End: 2024-07-10
Payer: MEDICARE

## 2024-07-10 DIAGNOSIS — Z79.01 LONG TERM (CURRENT) USE OF ANTICOAGULANTS: ICD-10-CM

## 2024-07-10 DIAGNOSIS — N28.89 RENAL MASS, LEFT: ICD-10-CM

## 2024-07-10 LAB
CREAT SERPL-MCNC: 1.16 MG/DL (ref 0.5–1.3)
EGFRCR SERPLBLD CKD-EPI 2021: 64 ML/MIN/1.73M*2
INR PPP: 3.4 (ref 0.9–1.1)
PROTHROMBIN TIME: 39 SECONDS (ref 9.8–12.8)

## 2024-07-10 PROCEDURE — 74170 CT ABD WO CNTRST FLWD CNTRST: CPT | Performed by: RADIOLOGY

## 2024-07-10 PROCEDURE — 85610 PROTHROMBIN TIME: CPT

## 2024-07-10 PROCEDURE — 74170 CT ABD WO CNTRST FLWD CNTRST: CPT

## 2024-07-10 PROCEDURE — 36415 COLL VENOUS BLD VENIPUNCTURE: CPT

## 2024-07-10 PROCEDURE — 82565 ASSAY OF CREATININE: CPT

## 2024-07-10 PROCEDURE — 2550000001 HC RX 255 CONTRASTS: Performed by: UROLOGY

## 2024-07-11 ENCOUNTER — ANTICOAGULATION - WARFARIN VISIT (OUTPATIENT)
Dept: CARDIOLOGY | Facility: CLINIC | Age: 79
End: 2024-07-11
Payer: MEDICARE

## 2024-07-11 NOTE — PROGRESS NOTES
Called and spoke with Mando Sousa regarding INR 3.4 with goal 2.5-3.5.  He is currently taking Coumadin 7.5 mg on Tuesdays and Thursdays along with Coumadin 5 mg all other days we will continue with this current dose repeat INR in 2 weeks approximately 7/24/2024.  Mando verbalizes understanding.

## 2024-07-16 ENCOUNTER — APPOINTMENT (OUTPATIENT)
Dept: CARDIOLOGY | Facility: CLINIC | Age: 79
End: 2024-07-16
Payer: MEDICARE

## 2024-07-16 VITALS
SYSTOLIC BLOOD PRESSURE: 120 MMHG | HEIGHT: 70 IN | DIASTOLIC BLOOD PRESSURE: 72 MMHG | BODY MASS INDEX: 31.21 KG/M2 | HEART RATE: 75 BPM | WEIGHT: 218 LBS

## 2024-07-16 DIAGNOSIS — Z95.0 PRESENCE OF CARDIAC PACEMAKER: ICD-10-CM

## 2024-07-16 DIAGNOSIS — I10 PRIMARY HYPERTENSION: ICD-10-CM

## 2024-07-16 DIAGNOSIS — I50.32 CHRONIC DIASTOLIC CONGESTIVE HEART FAILURE, NYHA CLASS 2 (MULTI): ICD-10-CM

## 2024-07-16 PROCEDURE — 3078F DIAST BP <80 MM HG: CPT | Performed by: INTERNAL MEDICINE

## 2024-07-16 PROCEDURE — 1036F TOBACCO NON-USER: CPT | Performed by: INTERNAL MEDICINE

## 2024-07-16 PROCEDURE — 99214 OFFICE O/P EST MOD 30 MIN: CPT | Performed by: INTERNAL MEDICINE

## 2024-07-16 PROCEDURE — 1159F MED LIST DOCD IN RCRD: CPT | Performed by: INTERNAL MEDICINE

## 2024-07-16 PROCEDURE — 3074F SYST BP LT 130 MM HG: CPT | Performed by: INTERNAL MEDICINE

## 2024-07-16 RX ORDER — FUROSEMIDE 20 MG/1
TABLET ORAL
Start: 2024-07-16

## 2024-07-16 NOTE — PATIENT INSTRUCTIONS
NEW:  JARDIANCE 10 MG DAILY,    NON FASTING LABS TO BE DONE 1-2 WEEKS AFTER STARTING JARDIANCE    CHANGE FUROSEMIDE TO 20 MG DAILY ONCE YOU START JARDIANCE, CONTINUE YOUR CURRENT DOSE UNTIL THEN    DR LORD IS REFERRING YOU TO [facility] INSTEAD OF SENDING YOU TO [facility]    DR LORD IS REFERRING YOU TO DR RODRIGUEZ TO HELP MONITOR YOUR PACEMAKER

## 2024-07-16 NOTE — PROGRESS NOTES
Patient:  Mando Tay  YOB: 1945  MRN: [id number]       Impression/Plan:     Diagnoses and all orders for this visit:  Chronic diastolic congestive heart failure, NYHA class 2 (Multi)  -     Functional class II diastolic chf and will likely improve with jardiance  -     Begin jardiance 10 mg daily with resumption of 20 daily lasix only  -     BMP 1-2 weeks after starting  Primary hypertension  -     Well controlled  Presence of cardiac pacemaker  -     Will transfer pacer checks to [facility] (this office when avaliable)  -     Consult Dr. Fair for further monitoring      Chief Complaint/Active Symptoms:       Mando Tay is a 79 y.o. male who presents with  paroxysmal atrial flutter, previous mechanical mitral valve replacement for mitral valve prolapse and endocarditis in 1998, sick sinus syndrome with permanent pacemaker.  Coronary angiography at that time with trivial irregularities circumflex only.  He has longstanding hypertension and diabetes.  From medical standpoint also with history of restrictive lung disease, T-cell lymphoma diagnosed in 2015.  Does have moderately severe restrictive lung disease     FALL 2022 he had symptoms of shortness of breath and CHF felt related to atrial flutter. He was put on flecainide cardioverted initially felt better but then further deteriorated had episodes of pneumonia some unexplained shortness of breath with eventually was felt related to flecainide.. He felt better off the flecainide which was discontinued in April. There is a 5-10% incidence of sensation of dyspnea associated with flecainide. Then became ill again with orchiditis. Subsequent CT scan demonstrated a right renal cyst and a left renal mass. He has since seen urology who feels it very likely could be malignancy but because of its size metastatic progression relatively unlikely and and serial imaging is planned.      Seen in pulmonary medicine office 10/17/2023 at which time the moderately  severe restrictive lung disease described.  Recommended he follow-up with cardiology for control of A-fib and hypertension though there was no evidence of A-fib at that office visit but blood pressure was about 150 systolic at that time.       4/23/2024 underwent left renal mass biopsy and cryoablation no cardiac complications described     Pathology demonstrated oncocytic renal neoplasm  5/13/2024 INR 3.1 CMP normal except glucose 119   CBC/10/24 normal platelet 142.      I had seen him 5/14/2024 with well-controlled blood pressure shortness of breath related diastolic dysfunction/restrictive lung disease and mild obstructive lung disease.  SGL 2 inhibitors were not affordable.  Carvedilol continued because of underlying SVT and it was elected to increase his Lasix.  Also at that time transition to [facility] pacemaker clinic.    He denies angina palpitation, edema, lightheadedness or syncope.  He has had no symptoms of claudication or neurologic deterioration.  There have been no hospitalizations or emergency room visits since last office visit.    Dyspneic after walking with 2 garbage cans to street.  No angina. No edema. Notes some improvement with increase in diuretic. Signif improvement but still with dyspnea.  Now can get Jardiance via [facility].  He says he is feeling a lot better with the adjustment of diuretic that we made at last visit.      Component      Latest Ref Rng 6/27/2024 7/10/2024   GLUCOSE      74 - 99 mg/dL 192 (H)     SODIUM      136 - 145 mmol/L 139     POTASSIUM      3.5 - 5.3 mmol/L 3.7     CHLORIDE      98 - 107 mmol/L 103     Bicarbonate      21 - 32 mmol/L 30     Anion Gap      10 - 20 mmol/L 10     Blood Urea Nitrogen      6 - 23 mg/dL 21     Creatinine      0.50 - 1.30 mg/dL 1.19  1.16    EGFR      >60 mL/min/1.73m*2 62  64    Calcium      8.6 - 10.3 mg/dL 8.8     Albumin      3.4 - 5.0 g/dL 4.2     Alkaline Phosphatase      33 - 136 U/L 69     Total Protein      6.4  - 8.2 g/dL 6.6     AST      9 - 39 U/L 19     Bilirubin Total      0.0 - 1.2 mg/dL 0.8     ALT      10 - 52 U/L 16     CHOLESTEROL      0 - 199 mg/dL 157     HDL CHOLESTEROL      mg/dL 42.7     Cholesterol/HDL Ratio 3.7     LDL Calculated      <=99 mg/dL 86     VLDL      0 - 40 mg/dL 29     TRIGLYCERIDES      0 - 149 mg/dL 144     Non HDL Cholesterol      0 - 149 mg/dL 114     Protime      9.8 - 12.8 seconds  39.0 (H)    INR      0.9 - 1.1   3.4 (H)       Legend:  (H) High                 Review of Systems: Unremarkable except as noted above    Meds     Current Outpatient Medications   Medication Instructions    amLODIPine (NORVASC) 10 mg, oral, Daily    betamethasone valerate (Valisone) 0.1 % cream Topical, 2 times daily    carvedilol (COREG) 25 mg, oral, 2 times daily    cholecalciferol (VITAMIN D3) 25 mcg, oral, Daily    cyanocobalamin (VITAMIN B-12) 100 mcg, oral, Daily    ezetimibe (ZETIA) 10 mg, oral, Nightly    fluticasone (Flonase) 50 mcg/actuation nasal spray 1 spray, Each Nostril, Daily, As needed    furosemide (Lasix) 20 mg tablet 2 TABLETS TOGETHER (40 MG) EVERY MON, WEDS AND FRI, 1 TABLET (20 MG) ALL OTHER DAYS OF THE WEEK    melatonin 5 mg capsule 1 capsule, oral, Nightly PRN    metFORMIN (GLUCOPHAGE) 500 mg, oral, 2 times daily (morning and late afternoon)    pravastatin (PRAVACHOL) 20 mg, oral, Daily    sertraline (ZOLOFT) 100 mg, oral, Daily    tamsulosin (FLOMAX) 0.8 mg, oral, Daily    valsartan (DIOVAN) 160 mg, oral, 2 times daily    warfarin (Coumadin) 5 mg tablet As directed to maintain therapeutic INR.  Number of tablets equals 90 day supply.  (Taking 7.5 mg Tues and Thurs, 5 mg all other days of the week)        Allergies     Allergies   Allergen Reactions    Atorvastatin Myalgia    Flecainide Unknown    Rosuvastatin Myalgia    Statins-Hmg-Coa Reductase Inhibitors Unknown     Statins do not work    Sulfa (Sulfonamide Antibiotics) Unknown    Procainamide Rash         Annotated Problems      Specialty Problems          Cardiology Problems    History of mitral valve replacement     1998 [employer] mechanical mitral valve replacement         Presence of cardiac pacemaker      2/18/15 generator change to [employer] Adapta L       10/04 Generator change to Med[employer]onic [place] DR [id number]      1998 first device (pacesetter) placed due to CHB after MVR surgery.         Mitral valve disease     · 12/7/2021 echo LVEF 60%. LVH. RVSP 44 mm. 2+ AI unchanged. Normal function      mechanical mitral valve       History of mitral valve prolapse with mitral valve replacement mechanical [employer]        valve 1998 secondary to severe MR and endocarditis   History of mitral valve prolapse with mitral valve replacement mechanical [employer]      valve 1998 secondary to severe MR and endocarditis      Late 1997 MI- Felt due to emboli from mitral valve vegetation.         Primary hypertension    Atypical atrial flutter (Multi)    Anticoagulant long-term use    Hyperlipidemia      · 1997 trivial coronary irreg in cx. normal LV, severe MR w/subsequent MVR.          Paroxysmal atrial fibrillation (Multi)    Paroxysmal atrial flutter (Multi)    Sick sinus syndrome (Multi)    Chronic diastolic congestive heart failure, NYHA class 2 (Multi)    Never smoked cigarettes        Problem List     Patient Active Problem List    Diagnosis Date Noted    Medicare annual wellness visit, subsequent 06/06/2024    Never smoked cigarettes 06/06/2024    Pre-operative clearance 04/04/2024    Chronic diastolic congestive heart failure, NYHA class 2 (Multi) 10/30/2023    Medication dose changed 10/30/2023    Adrenal nodule (Multi) 10/01/2023    Renal mass, left 10/01/2023    Arthritis 10/01/2023    Atypical depressive disorder 10/01/2023    Bilateral dry eyes 10/01/2023    BPPV (benign paroxysmal positional vertigo) 10/01/2023    Combined form of age-related cataract, both eyes 10/01/2023    Cyst of testis 10/01/2023    Dysphagia 10/01/2023     Edema 10/01/2023    Fatigue 10/01/2023    Hydrocele, bilateral 10/01/2023    Hyperlipidemia 10/01/2023    Hyperopia with presbyopia 10/01/2023    MGD (meibomian gland disease) 10/01/2023    Ocular migraine 10/01/2023    Orchitis and epididymitis 10/01/2023    Paroxysmal atrial fibrillation (Multi) 10/01/2023    Retinal scar of right eye 10/01/2023    Shortness of breath 10/01/2023    Sick sinus syndrome (Multi) 10/01/2023    Superficial basal cell carcinoma of skin of left shoulder 10/01/2023    Vestibulopathy 10/01/2023    Vitamin B 12 deficiency 10/01/2023    Vitamin D deficiency 10/01/2023    Hypoxia 10/01/2023    Anticoagulant long-term use 10/01/2023    Bilateral sensorineural hearing loss 10/01/2023    Chronic nasal congestion 10/01/2023    Class 1 obesity with body mass index (BMI) of 31.0 to 31.9 in adult 10/01/2023    Degenerative cervical spinal stenosis 10/01/2023    Diabetes mellitus treated with oral medication (Multi) 10/01/2023    High risk medication use 10/01/2023    History of snoring 10/01/2023    Hypertrophy of inferior nasal turbinate 10/01/2023    Paroxysmal atrial flutter (Multi) 10/01/2023    Tinnitus 10/01/2023    Agent orange exposure 08/21/2023    Atypical atrial flutter (Multi) 08/21/2023    BPH with obstruction/lower urinary tract symptoms 08/21/2023    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    Erectile dysfunction 08/21/2023    Major depressive disorder with single episode, in full remission ([facility]) 08/21/2023    Acute bronchitis due to other specified organisms 08/21/2023    Hemangioma of skin and subcutaneous tissue 06/20/2023    Actinic keratosis 06/20/2023    Other seborrheic keratosis 06/20/2023    Melanocytic nevi of scalp and neck 06/20/2023    Melanocytic nevi of trunk 06/20/2023    Melanocytic nevi of unspecified lower limb, including hip 06/20/2023    Melanocytic nevi of unspecified part of face 06/20/2023    Melanocytic nevi of unspecified upper limb, including  "shoulder 06/20/2023    Neoplasm of uncertain behavior of skin 06/20/2023    Other hypertrophic disorders of the skin 06/20/2023    Other melanin hyperpigmentation 06/20/2023    Personal history of other malignant neoplasm of skin 06/20/2023    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    Viral wart, unspecified 06/20/2023    Lumbar stenosis with neurogenic claudication 03/17/2020    Myalgia, unspecified site 09/03/2019    Radial styloid tenosynovitis 06/13/2019    RAMSES (obstructive sleep apnea) 02/27/2018    Primary hypertension 02/27/2018    Mitral valve disease 02/27/2018    Presence of cardiac pacemaker 01/24/2018    History of mitral valve replacement 01/24/2018    Cervical spondylosis without myelopathy 02/02/2016    Chondromalacia of patella 01/19/2016    Primary localized osteoarthrosis of shoulder region 01/19/2016    Adhesive capsulitis of shoulder 01/19/2016       Objective:     Vitals:    07/16/24 1228   BP: 120/72   BP Location: Right arm   Patient Position: Sitting   Pulse: 75   Weight: 98.9 kg (218 lb)   Height: 1.778 m (5' 10\")      Wt Readings from Last 4 Encounters:   07/16/24 98.9 kg (218 lb)   06/06/24 98.1 kg (216 lb 3.2 oz)   05/14/24 99.3 kg (219 lb)   04/23/24 95.3 kg (210 lb)           LAB:     Lab Results   Component Value Date    WBC 5.5 04/10/2024    HGB 13.6 04/10/2024    HCT 41.2 04/10/2024     (L) 04/10/2024    CHOL 157 06/27/2024    TRIG 144 06/27/2024    HDL 42.7 06/27/2024    ALT 16 06/27/2024    AST 19 06/27/2024     06/27/2024    K 3.7 06/27/2024     06/27/2024    CREATININE 1.16 07/10/2024    BUN 21 06/27/2024    CO2 30 06/27/2024    TSH 1.34 04/04/2022    PSA 4.59 (H) 02/01/2023    INR 3.4 (H) 07/10/2024    HGBA1C 6.9 (H) 06/27/2024       Diagnostic Studies:     CT kidney w and wo IV contrast    Result Date: 7/11/2024  Interpreted By:  Mauro Bowman, STUDY: CT KIDNEY W AND WO IV CONTRAST;  7/10/2024 1:35 pm   INDICATION: Signs/Symptoms:renl mass s/p ablation.   " COMPARISON: CT kidney 4 March 2024   ACCESSION NUMBER(S): [id number]   ORDERING CLINICIAN: SHEA BOWLES   TECHNIQUE: CT Kidney protocol CT of the abdomen from the lung bases to the iliac crests before and in two phases after the uneventful administration of intravenous (75 mL Omnipaque 350) contrast. No oral contrast   FINDINGS: RIGHT KIDNEY AND INCLUDED URETER: Radiodense stone:  Negative Hydronephrosis:  Negative Solid mass:  Negative. No solid, enhancing or otherwise suspect mass Enhancement:  Normal, uniform, enhancement.  No infarct or CT evidence of acute pyelonephritis or obstructive uropathy. Cyst/s:  14 mm hemorrhagic cyst posteriorly between the poles is unchanged, Bosniak type 2   LEFT KIDNEY AND INCLUDED URETER: Radiodense stone:  Negative Hydronephrosis:  Negative Solid mass:  Negative. No solid, enhancing or otherwise suspect mass. No enhancement at the cryoablation site to suggest any residual or recurrent neoplasm. No metachronous new primary neoplasm Enhancement: Normal, uniform, enhancement.  No infarct or CT evidence of acute pyelonephritis or obstructive uropathy. Cyst/s:  No evidence of complication from the cryoablation such as perinephric fluid/fluid collection. Subcentimeter simple (Bosniak type 1) medially, between the poles   LIVER:  Slightly lobular but unchanged contours. Not overtly fatty or enlarged. No suspect mass, only a few unchanged subcentimeter too small to characterize but likely benign low-attenuation lesions   SPLEEN:  Normal. No enlargement, mass or evidence of splenic vein thrombosis.   PANCREAS:  Normal. No CT evidence of acute or chronic pancreatitis. No duct dilation. No mass.   GALLBLADDER:  Normal CT appearance. No dilation, calcified, or gas-containing stones.  Other types of gallstones could be occult on CT and detectable only by ultrasound.   BILE DUCTS:  Normal. No biliary duct dilation.   ADRENAL GLANDS:  Normal. No nodule or mass.   LYMPH NODES:  No  adenopathy, intraperitoneal, retroperitoneal, pelvic or otherwise   INCLUDED BOWEL:  Impressive diverticulosis of the included left colon but no associated acute inflammatory changes. None of the small bowel is dilated. No acute findings   STOMACH / DUODENUM:  Grossly normal by CT which has limited sensitivity and specificity for the stomach and duodenum.   INCLUDED RETROPERITONEUM:  Normal. No acute hemorrhage or inflammatory change. Lymph nodes in a separate dedicated section.   INCLUDED OMENTUM, MESENTERY AND PERITONEAL SPACES: Free intraperitoneal air:  Negative Free intraperitoneal fluid:  Negative Abscess:  Negative Other:  n/a   VASCULATURE:  Aortic and iliac atherosclerotic calcifications without aneurysm or other acute finding. No high grade stenosis of the major abdominal aortic branch vessels. Portal venous system patent.   INCLUDED ABDOMINAL WALL: Hernia:  Negative Other:  No acute or contributory abnormality   LOWER CHEST:  No acute findings including no nodules suspect for metastatic disease or other malignancy. Tiny loculated anterior pericardial effusion is unchanged   BONES:  No acute findings including no aggressive osseous lesion suspect for metastatic disease or other malignancy       No residual/recurrent neoplasm at the left renal cryoablation site   No metastatic disease in the abdomen   No new left renal (or right renal, or any other abdominal) lesion suspect for metachronous new primary neoplasm   No evidence of complication from the cryoablation   MACRO: None   Signed by: Mauro Bowman 7/11/2024 12:54 PM Dictation workstation:   [id number]        Radiology:     No orders to display       Physical Exam     General Appearance: alert and oriented to person, place and time, in no acute distress  Cardiovascular: normal rate, regular rhythm, normal S1 and S2, no murmurs, rubs, clicks, or gallops,  no JVD  Pulmonary/Chest: clear to auscultation bilaterally- no wheezes, rales or rhonchi, normal air  movement, no respiratory distress  Abdomen: soft, non-tender, non-distended, normal bowel sounds, no masses   Extremities: no cyanosis, clubbing or edema  Skin: warm and dry, no rash or erythema  Eyes: EOMI  Neck: supple and non-tender without mass, no thyromegaly   Neurological: alert, oriented, normal speech, no focal findings or movement disorder noted  Vascular:

## 2024-07-17 ENCOUNTER — APPOINTMENT (OUTPATIENT)
Dept: PRIMARY CARE | Facility: CLINIC | Age: 79
End: 2024-07-17
Payer: MEDICARE

## 2024-07-17 VITALS
WEIGHT: 217.8 LBS | HEIGHT: 70 IN | DIASTOLIC BLOOD PRESSURE: 73 MMHG | SYSTOLIC BLOOD PRESSURE: 148 MMHG | HEART RATE: 65 BPM | BODY MASS INDEX: 31.18 KG/M2 | TEMPERATURE: 97.5 F

## 2024-07-17 DIAGNOSIS — E53.8 VITAMIN B 12 DEFICIENCY: ICD-10-CM

## 2024-07-17 DIAGNOSIS — E08.65 DIABETES MELLITUS DUE TO UNDERLYING CONDITION WITH HYPERGLYCEMIA, UNSPECIFIED WHETHER LONG TERM INSULIN USE (MULTI): ICD-10-CM

## 2024-07-17 DIAGNOSIS — E66.9 CLASS 1 OBESITY WITH BODY MASS INDEX (BMI) OF 31.0 TO 31.9 IN ADULT, UNSPECIFIED OBESITY TYPE, UNSPECIFIED WHETHER SERIOUS COMORBIDITY PRESENT: ICD-10-CM

## 2024-07-17 PROCEDURE — 1036F TOBACCO NON-USER: CPT | Performed by: FAMILY MEDICINE

## 2024-07-17 PROCEDURE — 3078F DIAST BP <80 MM HG: CPT | Performed by: FAMILY MEDICINE

## 2024-07-17 PROCEDURE — 1160F RVW MEDS BY RX/DR IN RCRD: CPT | Performed by: FAMILY MEDICINE

## 2024-07-17 PROCEDURE — 1159F MED LIST DOCD IN RCRD: CPT | Performed by: FAMILY MEDICINE

## 2024-07-17 PROCEDURE — 3077F SYST BP >= 140 MM HG: CPT | Performed by: FAMILY MEDICINE

## 2024-07-17 PROCEDURE — 99213 OFFICE O/P EST LOW 20 MIN: CPT | Performed by: FAMILY MEDICINE

## 2024-07-17 RX ORDER — LANOLIN ALCOHOL/MO/W.PET/CERES
2000 CREAM (GRAM) TOPICAL DAILY
Start: 2024-07-17

## 2024-07-17 RX ORDER — CALCIUM CARBONATE/VITAMIN D3 250-3.125
1 TABLET ORAL
COMMUNITY

## 2024-07-17 NOTE — PROGRESS NOTES
Juan Bhat is here today for a follow-up on his labs.  He states that he is overall been feeling well and has no complaints at the present time.  His A1c is 6.9.  He continues on his medications as noted.  Yesterday he saw Dr. Celis for cardiology follow-up.  It was recommended that he begin Jardiance 10 mg daily.  [facility] will [facility] at a much more reasonable cost.  He does not want to increase the metformin dosing at the present time.  He does get occasional diarrhea with this.  He continues on his other meds as noted.    Patient ID: Mando Tay is a 79 y.o. male who presents for Follow-up and Results (Blood work):    Problem List Items Addressed This Visit       Primary hypertension    Hyperlipidemia    Class 1 obesity with body mass index (BMI) of 31.0 to 31.9 in adult     Other Visit Diagnoses       Diabetes mellitus due to underlying condition with hyperglycemia, unspecified whether long term insulin use (Multi)               Past Medical History:   Diagnosis Date    Acute myocardial infarction, unspecified (Multi) 11/17/2021    Acute myocardial infarction    Cutaneous T-cell lymphoma (Multi)     Depression     Diabetes mellitus (Multi)     Epididymitis 06/02/2022    Epididymitis, right    Essential (primary) hypertension 12/08/2022    Hypertension    Heart disease     Kidney tumor     Obesity, unspecified 04/04/2022    Class 1 obesity with body mass index (BMI) of 31.0 to 31.9 in adult    Obesity, unspecified 06/08/2022    Class 1 obesity with body mass index (BMI) of 30.0 to 30.9 in adult    Obesity, unspecified 01/10/2022    Class 1 obesity with body mass index (BMI) of 31.0 to 31.9 in adult    Obesity, unspecified 01/31/2022    Class 1 obesity with body mass index (BMI) of 31.0 to 31.9 in adult    Other abnormal findings in urine 05/09/2022    Dark urine    Other specified disorders of kidney and ureter     Bilateral renal masses    Other specified disorders of  kidney and ureter 05/09/2022    Renal mass, right    Other specified disorders of the male genital organs 05/09/2022    Swelling of right half of scrotum    Other symptoms and signs involving the musculoskeletal system 11/17/2021    Leg fatigue    Personal history of other diseases of the circulatory system 11/17/2021    History of mitral valve insufficiency    Personal history of other diseases of the respiratory system 12/09/2021    History of acute bronchitis    Personal history of other specified conditions 04/04/2022    History of shortness of breath    Personal history of other specified conditions     History of snoring    Personal history of pneumonia (recurrent) 01/31/2022    History of pneumonia    Prediabetes 06/18/2018    Borderline diabetes mellitus    Scrotal pain 05/09/2022    Acute pain in scrotum    Shortness of breath 12/09/2022    Shortness of breath on exertion    Sleep apnea     cpap    Unspecified cataract 02/22/2017    Cataract of right eye    Unspecified cataract 02/22/2017    Cataract of left eye    Unspecified osteoarthritis, unspecified site     Arthritis      Past Surgical History:   Procedure Laterality Date    CORONARY ARTERY BYPASS GRAFT  02/22/2017    Coronary Artery Surgery    HERNIA REPAIR  02/22/2017    Inguinal Hernia Repair    INSERT / REPLACE / REMOVE PACEMAKER      x3    OTHER SURGICAL HISTORY  11/17/2021    Surgery    OTHER SURGICAL HISTORY  11/17/2021    Cardioversion    OTHER SURGICAL HISTORY  12/20/2021    Mitral valve replacement    OTHER SURGICAL HISTORY  12/20/2021    Cardiac catheterization    RENAL MASS EXCISION Left 04/2024    tumor removed      Family History   Problem Relation Name Age of Onset    Other (Cardiac disorder) Mother      Cataracts Mother      Hypertension Mother      Other (Cardiac disorder) Father      Stomach cancer Father      Hypertension Father        Social History     Socioeconomic History    Marital status:      Spouse name: Not on file     Number of children: Not on file    Years of education: Not on file    Highest education level: Not on file   Occupational History    Not on file   Tobacco Use    Smoking status: Never    Smokeless tobacco: Never   Vaping Use    Vaping status: Never Used   Substance and Sexual Activity    Alcohol use: Not Currently     Comment: not for years    Drug use: Never    Sexual activity: Not Currently   Other Topics Concern    Not on file   Social History Narrative    Not on file     Social Determinants of Health     Financial Resource Strain: Not on file   Food Insecurity: Not on file   Transportation Needs: Not on file   Physical Activity: Not on file   Stress: Not on file   Social Connections: Not on file   Intimate Partner Violence: Not on file   Housing Stability: Not on file      Atorvastatin, Flecainide, Rosuvastatin, Statins-hmg-coa reductase inhibitors, Sulfa (sulfonamide antibiotics), and Procainamide   Current Outpatient Medications   Medication Sig Dispense Refill    amLODIPine (Norvasc) 10 mg tablet Take 1 tablet (10 mg) by mouth once daily. 90 tablet 1    carvedilol (Coreg) 25 mg tablet Take 1 tablet (25 mg) by mouth 2 times a day. 180 tablet 1    cholecalciferol, vitamin D3, (VITAMIN D3 ORAL) Take 10,000 Int'l Units/day by mouth once daily.      cyanocobalamin (Vitamin B-12) 1,000 mcg tablet Take 100 mcg by mouth once daily.      ezetimibe (Zetia) 10 mg tablet Take 1 tablet (10 mg) by mouth once daily at bedtime. 90 tablet 1    furosemide (Lasix) 20 mg tablet 2 TABLETS TOGETHER (40 MG) EVERY MON, WEDS AND FRI, 1 TABLET (20 MG) ALL OTHER DAYS OF THE WEEK- ONCE YOU START JARDIANCE THEN CHANGE TO 20 MG DAILY      metFORMIN (Glucophage) 500 mg tablet Take 1 tablet (500 mg) by mouth 2 times daily (morning and late afternoon). 180 tablet 1    pravastatin (Pravachol) 20 mg tablet Take 1 tablet (20 mg) by mouth once daily. 90 tablet 1    sertraline (Zoloft) 100 mg tablet Take 1 tablet (100 mg) by mouth once daily.  90 tablet 1    tamsulosin (Flomax) 0.4 mg 24 hr capsule Take 2 capsules (0.8 mg) by mouth once daily. 90 capsule 3    valsartan (Diovan) 160 mg tablet Take 1 tablet (160 mg) by mouth 2 times a day. 180 tablet 1    warfarin (Coumadin) 5 mg tablet As directed to maintain therapeutic INR.  Number of tablets equals 90 day supply.  (Taking 7.5 mg Tues and Thurs, 5 mg all other days of the week) 110 tablet 1    betamethasone valerate (Valisone) 0.1 % cream Apply topically 2 times a day. (Patient not taking: Reported on 7/17/2024) 15 g 0    calcium carbonate-vitamin D3 (Oyster Shell) 250 mg-3.125 mcg (125 unit) tablet Take 1 tablet by mouth 2 times daily (morning and late afternoon).      cholecalciferol (Vitamin D3) 25 MCG (1000 UT) capsule Take 1 capsule (25 mcg) by mouth once daily.      empagliflozin (Jardiance) 10 mg Take 1 tablet (10 mg) by mouth once daily. (Patient not taking: Reported on 7/17/2024) 90 tablet 1    melatonin 5 mg capsule Take 1 capsule (5 mg) by mouth as needed at bedtime.       No current facility-administered medications for this visit.       Immunization History   Administered Date(s) Administered    Flu vaccine (IIV4), preservative free *Check age/dose* 10/08/2020    Flu vaccine, quadrivalent, high-dose, preservative free, age 65y+ (FLUZONE) 10/03/2023    Influenza, High Dose Seasonal, Preservative Free 11/30/2017, 10/16/2018, 10/17/2019    Influenza, Seasonal, Quadrivalent, Adjuvanted 11/22/2021, 11/07/2022    Influenza, Unspecified 10/01/2010, 01/01/2014, 02/01/2015, 10/01/2018, 11/01/2022    Influenza, live, intranasal, quadrivalent 11/22/2021    Influenza, seasonal, injectable 01/15/2014, 11/28/2018, 08/01/2019, 10/01/2020, 09/13/2021, 09/19/2022    Pfizer COVID-19 vaccine, Fall 2023, 12 years and older, (30mcg/0.3mL) 01/26/2024    Pfizer Gray Cap SARS-CoV-2 05/05/2022    Pfizer Purple Cap SARS-CoV-2 02/10/2021, 03/02/2021, 10/13/2021, 02/07/2022    Pneumococcal Conjugate PCV 7 1945     Pneumococcal conjugate vaccine, 13-valent (PREVNAR 13) 03/03/2017    Pneumococcal polysaccharide vaccine, 23-valent, age 2 years and older (PNEUMOVAX 23) 02/10/2014, 07/30/2019, 08/16/2021    Tdap vaccine, age 7 year and older (BOOSTRIX, ADACEL) 07/15/2024    Zoster vaccine, recombinant, adult (SHINGRIX) 06/19/2019, 08/19/2019    Zoster, Unspecified 07/01/2019, 08/31/2019        Review of Systems   Constitutional: Negative.    HENT: Negative.     Eyes: Negative.    Respiratory: Negative.     Cardiovascular: Negative.    Gastrointestinal: Negative.    Endocrine: Negative.    Genitourinary: Negative.    Musculoskeletal: Negative.    Skin: Negative.    Allergic/Immunologic: Negative.    Hematological: Negative.    Psychiatric/Behavioral: Negative.     All other systems reviewed and are negative.       Vitals:    07/17/24 1112   BP: 148/73   Pulse: 65   Temp: 36.4 °C (97.5 °F)     Vitals:    07/17/24 1112   Weight: 98.8 kg (217 lb 12.8 oz)      Physical Exam  Constitutional:       General: He is not in acute distress.     Appearance: Normal appearance.   Neurological:      Mental Status: He is alert.          ASSESSMENT/PLAN: Diabetes mellitus type II with fair control.  Continue metformin as noted.  [facility].  We discussed potential side effects.  Eye exams are up-to-date.  Check A1c and CMP in 3 months.    Borderline vitamin B12 deficiency.  Vitamin B12 239.  Patient currently taking vitamin B12 1000 units daily.  Increase vitamin B-12 to 2000 units daily.  Recheck vitamin B12 level in 3 months    Continue other meds noted  Follow-up in 4 months and call as needed         Scribe Attestation  By signing my name below, I, Juliann Tamayo LPN, Scribe   attest that this documentation has been prepared under the direction and in the presence of Juan West MD.

## 2024-07-18 ASSESSMENT — ENCOUNTER SYMPTOMS
CONSTITUTIONAL NEGATIVE: 1
ENDOCRINE NEGATIVE: 1
MUSCULOSKELETAL NEGATIVE: 1
ALLERGIC/IMMUNOLOGIC NEGATIVE: 1
RESPIRATORY NEGATIVE: 1
GASTROINTESTINAL NEGATIVE: 1
CARDIOVASCULAR NEGATIVE: 1
EYES NEGATIVE: 1
HEMATOLOGIC/LYMPHATIC NEGATIVE: 1
PSYCHIATRIC NEGATIVE: 1

## 2024-07-24 ENCOUNTER — LAB (OUTPATIENT)
Dept: LAB | Facility: LAB | Age: 79
End: 2024-07-24
Payer: MEDICARE

## 2024-07-24 DIAGNOSIS — Z79.01 LONG TERM (CURRENT) USE OF ANTICOAGULANTS: ICD-10-CM

## 2024-07-24 LAB
INR PPP: 3.9 (ref 0.9–1.1)
PROTHROMBIN TIME: 45.1 SECONDS (ref 9.8–12.8)

## 2024-07-24 PROCEDURE — 36415 COLL VENOUS BLD VENIPUNCTURE: CPT

## 2024-07-24 PROCEDURE — 85610 PROTHROMBIN TIME: CPT

## 2024-07-25 ENCOUNTER — APPOINTMENT (OUTPATIENT)
Dept: UROLOGY | Facility: CLINIC | Age: 79
End: 2024-07-25
Payer: MEDICARE

## 2024-07-25 ENCOUNTER — ANTICOAGULATION - WARFARIN VISIT (OUTPATIENT)
Dept: CARDIOLOGY | Facility: CLINIC | Age: 79
End: 2024-07-25

## 2024-07-25 ENCOUNTER — TELEPHONE (OUTPATIENT)
Dept: CARDIOLOGY | Facility: CLINIC | Age: 79
End: 2024-07-25

## 2024-07-25 VITALS
WEIGHT: 216 LBS | DIASTOLIC BLOOD PRESSURE: 73 MMHG | SYSTOLIC BLOOD PRESSURE: 145 MMHG | BODY MASS INDEX: 30.92 KG/M2 | HEIGHT: 70 IN | HEART RATE: 84 BPM | TEMPERATURE: 98.7 F

## 2024-07-25 DIAGNOSIS — N40.1 BPH WITH OBSTRUCTION/LOWER URINARY TRACT SYMPTOMS: Primary | ICD-10-CM

## 2024-07-25 DIAGNOSIS — N13.8 BPH WITH OBSTRUCTION/LOWER URINARY TRACT SYMPTOMS: Primary | ICD-10-CM

## 2024-07-25 DIAGNOSIS — N28.89 RENAL MASS, LEFT: ICD-10-CM

## 2024-07-25 PROCEDURE — 99214 OFFICE O/P EST MOD 30 MIN: CPT | Performed by: UROLOGY

## 2024-07-25 PROCEDURE — 1159F MED LIST DOCD IN RCRD: CPT | Performed by: UROLOGY

## 2024-07-25 PROCEDURE — G2211 COMPLEX E/M VISIT ADD ON: HCPCS | Performed by: UROLOGY

## 2024-07-25 PROCEDURE — 3077F SYST BP >= 140 MM HG: CPT | Performed by: UROLOGY

## 2024-07-25 PROCEDURE — 3078F DIAST BP <80 MM HG: CPT | Performed by: UROLOGY

## 2024-07-25 RX ORDER — BETAMETHASONE VALERATE 1 MG/G
CREAM TOPICAL 2 TIMES DAILY
COMMUNITY

## 2024-07-25 NOTE — PROGRESS NOTES
Subjective   Patient ID: Mando Tay is a 79 y.o. male who presents for CT scan results (S/P left cryoablation/). Last seen 3/1/24 when We reviewed his recent CT results. The renal lesion has grown. We discussed treatment options. The lesion is currently 2.5 cm. I recommend we move to ablation of the lesion before it is too big for this treatment, especially since this patient is not a good surgical candidate. Provide a referral to interventional radiology for consult on renal lesion ablation.   I think we can continue to watch the PSA. Patient will double his tamsulosin to 0.8 mg nightly. Refill his prescription. Order PSA for 6 months. Patient will follow-up in 6 months with results.   Patient is s/p ablation of left renal mass on 4/23/24.    HPI  Patient denies flank pain and hematuria. Patient is taking tamsulosin and Lasix. He notes a couple of times per month it is difficult to void first thing in the morning. He denies other urinary issues at this time.     CT Kidney w/wo contrast  IMPRESSION:  No residual/recurrent neoplasm at the left renal cryoablation site  No metastatic disease in the abdomen  No new left renal (or right renal, or any other abdominal) lesion  suspect for metachronous new primary neoplasm  No evidence of complication from the cryoablation    PSA Results  Component  Ref Range & Units 5 mo ago 3 yr ago 4 yr ago   Prostate Specific Antigen,Screen  <=4.00 ng/mL 6.12 High  3.00 R, CM 2.20 R, CM     Review of Systems  A 12 system review was completed and is negative with the exception of those signs and symptoms noted in the history of present illness.    Objective   Physical Exam  General: in NAD, appears stated age  Head: normocephalic, atraumatic  Respiratory: normal effort, no use of accessory muscles  Cardiovascular: no edema noted  Skin: normal turgor, no rashes  Neurologic: grossly intact, oriented to person/place/time  Psychiatric: mode and affect appropriate     Assessment/Plan    Problem List Items Addressed This Visit             ICD-10-CM    Renal mass, left N28.89    Relevant Orders    CBC    Comprehensive Metabolic Panel    CT kidney w and wo IV contrast     We discussed the CT results which look good. Order CMP, CT Renal Mass, and PSA in 9 months and follow-up with results.     Follow-up in 9mo for continued management of renal mass and BPH.     Scribe Attestation  By signing my name below, I, Lucinda Montano   attest that this documentation has been prepared under the direction and in the presence of Rafael Harp MD.

## 2024-07-25 NOTE — TELEPHONE ENCOUNTER
Call to patient. Advised to hold Coumadin dose today, then beginning 7/26/24: 7.5mg TUES only, 5mg all other days.  Patient read back instructions and states understanding. Advised that I would get clarification on when he is due to repeat INR. Message to be sent to MARIANO Jordan.

## 2024-07-25 NOTE — PROGRESS NOTES
Coumadin instructions forwarded to nursing to call Mando with INR results and instructions in my absence..

## 2024-07-29 ENCOUNTER — LAB (OUTPATIENT)
Dept: LAB | Facility: LAB | Age: 79
End: 2024-07-29
Payer: MEDICARE

## 2024-07-29 DIAGNOSIS — Z79.01 LONG TERM (CURRENT) USE OF ANTICOAGULANTS: ICD-10-CM

## 2024-07-29 LAB
INR PPP: 1.7 (ref 0.9–1.1)
PROTHROMBIN TIME: 19.2 SECONDS (ref 9.8–12.8)

## 2024-07-29 PROCEDURE — 36415 COLL VENOUS BLD VENIPUNCTURE: CPT

## 2024-07-29 PROCEDURE — 85610 PROTHROMBIN TIME: CPT

## 2024-07-30 ENCOUNTER — TELEPHONE (OUTPATIENT)
Dept: CARDIOLOGY | Facility: CLINIC | Age: 79
End: 2024-07-30
Payer: MEDICARE

## 2024-07-30 NOTE — TELEPHONE ENCOUNTER
Note routed to Dr. Ck Celis F.A.C.C. and note placed on his desk-Per written orders Dr. Ck Celis F.A.C.C. patient to take Warfarin using 5 mg tablets-7.5 mg on Mon and Thurs, 5 mg all other days of the week with in in 2 weeks 8/13/24.  Patient given instructions.  He was in agreement with plan and verbalized understanding.  Note routed to ROBERT Petit, CNP for FYI.  Tiera Francis, CMA

## 2024-07-30 NOTE — TELEPHONE ENCOUNTER
Patient returned call to office and states he held Warfarin 7/25/24 as instructed.  Patient states he never made it to a Tuesday to take 7.5 mg before his next INR and has been taking 5 mg every day.  Note to Dr. Ck Celis, [email or web address] for review.  Patient instructs if office can't reach him directly his instructions can be left on his voice mail.  Tiera Francis, CMA

## 2024-07-30 NOTE — TELEPHONE ENCOUNTER
Called patient who was not available.  Left voice mail instructing patient to call the office regarding his INR.  Note to be routed to ROBERT Petit CNP once patient instructed for follow up.  Tiera Francis CMA

## 2024-07-30 NOTE — TELEPHONE ENCOUNTER
----- Message from Ck Celis sent at 7/30/2024 11:59 AM EDT -----  Increase Coumadin to 7.5 Monday Wednesday Friday 5 mg other days repeat in 1 to 2 weeks.  This is assuming his current dose of 7.5 twice a week and 5 mg other days.  Please check  ----- Message -----  From: Lab, Background User  Sent: 7/29/2024   4:50 PM EDT  To:  [email or web address] Acoag

## 2024-08-02 ENCOUNTER — APPOINTMENT (OUTPATIENT)
Dept: PULMONOLOGY | Facility: CLINIC | Age: 79
End: 2024-08-02
Payer: MEDICARE

## 2024-08-06 ENCOUNTER — ANTICOAGULATION - WARFARIN VISIT (OUTPATIENT)
Dept: CARDIOLOGY | Facility: CLINIC | Age: 79
End: 2024-08-06
Payer: MEDICARE

## 2024-08-14 ENCOUNTER — LAB (OUTPATIENT)
Dept: LAB | Facility: LAB | Age: 79
End: 2024-08-14
Payer: MEDICARE

## 2024-08-14 DIAGNOSIS — Z79.01 LONG TERM (CURRENT) USE OF ANTICOAGULANTS: ICD-10-CM

## 2024-08-14 DIAGNOSIS — Z95.0 PRESENCE OF CARDIAC PACEMAKER: ICD-10-CM

## 2024-08-14 DIAGNOSIS — I10 PRIMARY HYPERTENSION: ICD-10-CM

## 2024-08-14 DIAGNOSIS — I50.32 CHRONIC DIASTOLIC CONGESTIVE HEART FAILURE, NYHA CLASS 2 (MULTI): ICD-10-CM

## 2024-08-14 LAB
ANION GAP SERPL CALC-SCNC: 10 MMOL/L (ref 10–20)
BUN SERPL-MCNC: 19 MG/DL (ref 6–23)
CALCIUM SERPL-MCNC: 9.2 MG/DL (ref 8.6–10.3)
CHLORIDE SERPL-SCNC: 106 MMOL/L (ref 98–107)
CO2 SERPL-SCNC: 28 MMOL/L (ref 21–32)
CREAT SERPL-MCNC: 1.1 MG/DL (ref 0.5–1.3)
EGFRCR SERPLBLD CKD-EPI 2021: 68 ML/MIN/1.73M*2
GLUCOSE SERPL-MCNC: 92 MG/DL (ref 74–99)
INR PPP: 3.2 (ref 0.9–1.1)
POTASSIUM SERPL-SCNC: 4.6 MMOL/L (ref 3.5–5.3)
PROTHROMBIN TIME: 36.2 SECONDS (ref 9.8–12.8)
SODIUM SERPL-SCNC: 139 MMOL/L (ref 136–145)

## 2024-08-14 PROCEDURE — 85610 PROTHROMBIN TIME: CPT

## 2024-08-14 PROCEDURE — 80048 BASIC METABOLIC PNL TOTAL CA: CPT

## 2024-08-14 PROCEDURE — 36415 COLL VENOUS BLD VENIPUNCTURE: CPT

## 2024-08-15 ENCOUNTER — ANTICOAGULATION - WARFARIN VISIT (OUTPATIENT)
Dept: CARDIOLOGY | Facility: CLINIC | Age: 79
End: 2024-08-15
Payer: MEDICARE

## 2024-08-15 NOTE — PROGRESS NOTES
Called and spoke with Mando Tay regarding INR of 3.2 with goal 2.5-3.5.  Will continue current Coumadin dosing and obtain INR on 4 weeks approximately 9/12/2024.  Mando verbalizes understanding.

## 2024-08-19 ENCOUNTER — HOSPITAL ENCOUNTER (OUTPATIENT)
Dept: CARDIOLOGY | Age: 79
Discharge: HOME | End: 2024-08-19
Payer: MEDICARE

## 2024-08-19 PROCEDURE — 93296 REM INTERROG EVL PM/IDS: CPT

## 2024-08-19 PROCEDURE — 93294 REM INTERROG EVL PM/LDLS PM: CPT | Performed by: INTERNAL MEDICINE

## 2024-08-29 ENCOUNTER — LAB (OUTPATIENT)
Dept: LAB | Facility: LAB | Age: 79
End: 2024-08-29
Payer: MEDICARE

## 2024-08-29 ENCOUNTER — APPOINTMENT (OUTPATIENT)
Dept: CARDIOLOGY | Facility: CLINIC | Age: 79
End: 2024-08-29
Payer: MEDICARE

## 2024-08-29 VITALS — SYSTOLIC BLOOD PRESSURE: 152 MMHG | DIASTOLIC BLOOD PRESSURE: 80 MMHG | HEART RATE: 84 BPM

## 2024-08-29 DIAGNOSIS — N40.1 BPH WITH OBSTRUCTION/LOWER URINARY TRACT SYMPTOMS: ICD-10-CM

## 2024-08-29 DIAGNOSIS — Z79.01 LONG TERM (CURRENT) USE OF ANTICOAGULANTS: ICD-10-CM

## 2024-08-29 DIAGNOSIS — N13.8 BPH WITH OBSTRUCTION/LOWER URINARY TRACT SYMPTOMS: ICD-10-CM

## 2024-08-29 DIAGNOSIS — Z95.0 PRESENCE OF CARDIAC PACEMAKER: ICD-10-CM

## 2024-08-29 LAB
INR PPP: 3.3 (ref 0.9–1.1)
PROTHROMBIN TIME: 37.7 SECONDS (ref 9.8–12.8)
PSA SERPL-MCNC: 8.17 NG/ML

## 2024-08-29 PROCEDURE — 36415 COLL VENOUS BLD VENIPUNCTURE: CPT

## 2024-08-29 PROCEDURE — 84153 ASSAY OF PSA TOTAL: CPT

## 2024-08-29 PROCEDURE — 85610 PROTHROMBIN TIME: CPT

## 2024-08-29 NOTE — H&P (VIEW-ONLY)
CARDIOLOGY OFFICE VISIT      CHIEF COMPLAINT  Chief Complaint   Patient presents with    Follow-up     pacemaker       HISTORY OF PRESENT ILLNESS  HPI  79-year-old male with a past medical history of paroxysmal atrial flutter with previous mechanical mitral valve replacement for mitral valve prolapse and endocarditis in 1998, sinus node dysfunction status post permanent pacemaker implantation.  He coronary angiogram at that time showed trivial irregularities in the circumflex only.  Has a history of hypertension diabetes mellitus.  He also has restrictive lung disease T-cell lymphoma diagnosed in 2015.    Back in 2022 he had episodes of shortness of breath that he felt that it was related with atrial flutter.  He was placed on flecainide but he got episodes of shortness of breath that this medication was discontinued.  Symptoms improved.    Patient had the diagnosis of restrictive lung disease in 2023 and followed by pulmonary service.      4/23/2024 underwent left renal mass biopsy and cryoablation no cardiac complications described     Pathology demonstrated oncocytic renal neoplasm  5/13/2024 INR 3.1 CMP normal except glucose 119   CBC/10/24 normal platelet 142.       Patient states that lately he has been doing well.  Denies any symptoms like chest pain or shortness of breath or palpitations.  Patient had a device interrogation in August 19, 2024 by the device clinic.  Device interrogation showed that the patient has been in atrial fibrillation at least since May 2024.  Battery longevity 3.58 years.  [place] of atrial fibrillation 75% of the time.    Echocardiogram May 2024     CONCLUSIONS:      1. Left ventricular systolic function is normal with a 65% estimated ejection fraction.   2. Spectral Doppler shows an impaired relaxation pattern of left ventricular diastolic filling.   3. There is moderate concentric left ventricular hypertrophy.   4. Mild left atrial enlargement.   5. Moderate aortic valve  regurgitation.   6. Normal functioning [facility] MVR.   7. Right-sided pacemaker lead noted.   8. No comparison study available.    Past Medical History  Past Medical History:   Diagnosis Date    Acute myocardial infarction, unspecified (Multi) 11/17/2021    Acute myocardial infarction    Cutaneous T-cell lymphoma (Multi)     Depression     Diabetes mellitus (Multi)     Epididymitis 06/02/2022    Epididymitis, right    Essential (primary) hypertension 12/08/2022    Hypertension    Heart disease     Kidney tumor     Obesity, unspecified 04/04/2022    Class 1 obesity with body mass index (BMI) of 31.0 to 31.9 in adult    Obesity, unspecified 06/08/2022    Class 1 obesity with body mass index (BMI) of 30.0 to 30.9 in adult    Obesity, unspecified 01/10/2022    Class 1 obesity with body mass index (BMI) of 31.0 to 31.9 in adult    Obesity, unspecified 01/31/2022    Class 1 obesity with body mass index (BMI) of 31.0 to 31.9 in adult    Other abnormal findings in urine 05/09/2022    Dark urine    Other specified disorders of kidney and ureter     Bilateral renal masses    Other specified disorders of kidney and ureter 05/09/2022    Renal mass, right    Other specified disorders of the male genital organs 05/09/2022    Swelling of right half of scrotum    Other symptoms and signs involving the musculoskeletal system 11/17/2021    Leg fatigue    Personal history of other diseases of the circulatory system 11/17/2021    History of mitral valve insufficiency    Personal history of other diseases of the respiratory system 12/09/2021    History of acute bronchitis    Personal history of other specified conditions 04/04/2022    History of shortness of breath    Personal history of other specified conditions     History of snoring    Personal history of pneumonia (recurrent) 01/31/2022    History of pneumonia    Prediabetes 06/18/2018    Borderline diabetes mellitus    Scrotal pain 05/09/2022    Acute pain in scrotum    Shortness  of breath 12/09/2022    Shortness of breath on exertion    Sleep apnea     cpap    Unspecified cataract 02/22/2017    Cataract of right eye    Unspecified cataract 02/22/2017    Cataract of left eye    Unspecified osteoarthritis, unspecified site     Arthritis       Social History  Social History     Tobacco Use    Smoking status: Never    Smokeless tobacco: Never   Vaping Use    Vaping status: Never Used   Substance Use Topics    Alcohol use: Not Currently     Comment: not for years    Drug use: Never       Family History     Family History   Problem Relation Name Age of Onset    Other (Cardiac disorder) Mother      Cataracts Mother      Hypertension Mother      Other (Cardiac disorder) Father      Stomach cancer Father      Hypertension Father          Allergies:  Allergies   Allergen Reactions    Atorvastatin Myalgia    Flecainide Unknown    Rosuvastatin Myalgia    Statins-Hmg-Coa Reductase Inhibitors Unknown     Statins do not work    Sulfa (Sulfonamide Antibiotics) Unknown    Procainamide Rash        Outpatient Medications:  Current Outpatient Medications   Medication Instructions    amLODIPine (NORVASC) 10 mg, oral, Daily    betamethasone valerate (Valisone) 0.1 % cream Topical, 2 times daily    calcium carbonate-vitamin D3 (Oyster Shell) 250 mg-3.125 mcg (125 unit) tablet 1 tablet, oral, 2 times daily (morning and late afternoon)    carvedilol (COREG) 25 mg, oral, 2 times daily    cholecalciferol, vitamin D3, (VITAMIN D3 ORAL) 10,000 Int'l Units/day, oral, Daily    cyanocobalamin (VITAMIN B-12) 2,000 mcg, oral, Daily    empagliflozin (JARDIANCE) 10 mg, oral, Daily    ezetimibe (ZETIA) 10 mg, oral, Nightly    furosemide (Lasix) 20 mg tablet 2 TABLETS TOGETHER (40 MG) EVERY MON, WEDS AND FRI, 1 TABLET (20 MG) ALL OTHER DAYS OF THE WEEK- ONCE YOU START JARDIANCE THEN CHANGE TO 20 MG DAILY    metFORMIN (GLUCOPHAGE) 500 mg, oral, 2 times daily (morning and late afternoon)    pravastatin (PRAVACHOL) 20 mg, oral,  Daily    sertraline (ZOLOFT) 100 mg, oral, Daily    tamsulosin (FLOMAX) 0.8 mg, oral, Daily    valsartan (DIOVAN) 160 mg, oral, 2 times daily    warfarin (Coumadin) 5 mg tablet As directed to maintain therapeutic INR.  Number of tablets equals 90 day supply.  (Taking 7.5 mg Tues and Thurs, 5 mg all other days of the week)          REVIEW OF SYSTEMS  Review of Systems   All other systems reviewed and are negative.        VITALS  Vitals:    08/29/24 1202   BP: 152/80   Pulse: 84       PHYSICAL EXAM  Constitutional:       Appearance: Healthy appearance. Not in distress.   Neck:      Vascular: No JVR. JVD normal.   Pulmonary:      Effort: Pulmonary effort is normal.      Breath sounds: Normal breath sounds. No wheezing. No rhonchi. No rales.   Chest:      Chest wall: Not tender to palpatation.   Cardiovascular:      PMI at left midclavicular line. Normal rate. Regular rhythm. Normal S1. Normal S2.       Murmurs: There is no murmur.      No gallop.  No click. No rub.      Comments: Device in the left prepectoral healing well.  No signs of hematoma or infection.     Pulses:     Intact distal pulses.   Edema:     Peripheral edema absent.   Abdominal:      General: Bowel sounds are normal.      Palpations: Abdomen is soft.      Tenderness: There is no abdominal tenderness.   Musculoskeletal: Normal range of motion.         General: No tenderness. Skin:     General: Skin is warm and dry.   Neurological:      General: No focal deficit present.      Mental Status: Alert and oriented to person, place and time.           ASSESSMENT AND PLAN  Clinical impression    1.  Sinus node dysfunction  2.  Status post pacemaker implantation  3.  Atrial fibrillation, recurrence  4.  History of high risk medication (flecainide), this medication was discontinued due to shortness of breath  5.  Normal left ventricular function per echocardiogram 2024  6.  Mitral valve disease status post mitral repair with a history of endocarditis  7.   Long-term anticoagulation therapy with warfarin  8.  Hypertension  9.  Diabetes mellitus    Plan recommendations    I had a lengthy discussion with patient about the findings of the device interrogation.  Pacemaker is functioning well but patient shows that he is in atrial fibrillation-atrial flutter at least since May - June 2024.  He is asymptomatic.  We discussed the option of restoration to sinus rhythm versus rate control strategy.  Patient agrees with restoration to sinus rhythm.  Looking at his records his last INR a month ago was 1.7.  INR performed August 29, 2024 3.3.  Patient will be scheduled for MARTINA guided cardioversion in the next few days.  Procedure, risk, benefits and possible complications were explained to patient.  All questions were answered.  Patient agrees with plan.  Informed consent was signed.    Follow my office in 4 to 6 weeks or sooner if needed.    If he has recurrence of atrial fibrillation we can discuss the option of antiarrhythmic therapy.  Baseline EKG has a QT interval close to 500 ms.  May not be able to use dofetilide or sotalol.  The only option could be amiodarone but he has restrictive lung disease.  We may have to discuss the option of rate control study if we cannot use antiarrhythmic therapy.    Follow with device clinic as scheduled.    Risk factor modification and lifestyle modification discussed with patient. Diet , exercise and hydration discussed with patient.    I have personally review with patient during this office visit, laboratory data, echocardiogram results, stress test results, Holter-event monitor results prior and after the last electrophysiology visit. All questions has been answered.    Please excuse any errors in grammar or translation related to this dictation.  Voice recognition software was utilized to prepare this document.

## 2024-08-29 NOTE — H&P (VIEW-ONLY)
CARDIOLOGY OFFICE VISIT      CHIEF COMPLAINT  Chief Complaint   Patient presents with    Follow-up     pacemaker       HISTORY OF PRESENT ILLNESS  HPI  79-year-old male with a past medical history of paroxysmal atrial flutter with previous mechanical mitral valve replacement for mitral valve prolapse and endocarditis in 1998, sinus node dysfunction status post permanent pacemaker implantation.  He coronary angiogram at that time showed trivial irregularities in the circumflex only.  Has a history of hypertension diabetes mellitus.  He also has restrictive lung disease T-cell lymphoma diagnosed in 2015.    Back in 2022 he had episodes of shortness of breath that he felt that it was related with atrial flutter.  He was placed on flecainide but he got episodes of shortness of breath that this medication was discontinued.  Symptoms improved.    Patient had the diagnosis of restrictive lung disease in 2023 and followed by pulmonary service.      4/23/2024 underwent left renal mass biopsy and cryoablation no cardiac complications described     Pathology demonstrated oncocytic renal neoplasm  5/13/2024 INR 3.1 CMP normal except glucose 119   CBC/10/24 normal platelet 142.       Patient states that lately he has been doing well.  Denies any symptoms like chest pain or shortness of breath or palpitations.  Patient had a device interrogation in August 19, 2024 by the device clinic.  Device interrogation showed that the patient has been in atrial fibrillation at least since May 2024.  Battery longevity 3.58 years.  [place] of atrial fibrillation 75% of the time.    Echocardiogram May 2024     CONCLUSIONS:      1. Left ventricular systolic function is normal with a 65% estimated ejection fraction.   2. Spectral Doppler shows an impaired relaxation pattern of left ventricular diastolic filling.   3. There is moderate concentric left ventricular hypertrophy.   4. Mild left atrial enlargement.   5. Moderate aortic valve  regurgitation.   6. Normal functioning [facility] MVR.   7. Right-sided pacemaker lead noted.   8. No comparison study available.    Past Medical History  Past Medical History:   Diagnosis Date    Acute myocardial infarction, unspecified (Multi) 11/17/2021    Acute myocardial infarction    Cutaneous T-cell lymphoma (Multi)     Depression     Diabetes mellitus (Multi)     Epididymitis 06/02/2022    Epididymitis, right    Essential (primary) hypertension 12/08/2022    Hypertension    Heart disease     Kidney tumor     Obesity, unspecified 04/04/2022    Class 1 obesity with body mass index (BMI) of 31.0 to 31.9 in adult    Obesity, unspecified 06/08/2022    Class 1 obesity with body mass index (BMI) of 30.0 to 30.9 in adult    Obesity, unspecified 01/10/2022    Class 1 obesity with body mass index (BMI) of 31.0 to 31.9 in adult    Obesity, unspecified 01/31/2022    Class 1 obesity with body mass index (BMI) of 31.0 to 31.9 in adult    Other abnormal findings in urine 05/09/2022    Dark urine    Other specified disorders of kidney and ureter     Bilateral renal masses    Other specified disorders of kidney and ureter 05/09/2022    Renal mass, right    Other specified disorders of the male genital organs 05/09/2022    Swelling of right half of scrotum    Other symptoms and signs involving the musculoskeletal system 11/17/2021    Leg fatigue    Personal history of other diseases of the circulatory system 11/17/2021    History of mitral valve insufficiency    Personal history of other diseases of the respiratory system 12/09/2021    History of acute bronchitis    Personal history of other specified conditions 04/04/2022    History of shortness of breath    Personal history of other specified conditions     History of snoring    Personal history of pneumonia (recurrent) 01/31/2022    History of pneumonia    Prediabetes 06/18/2018    Borderline diabetes mellitus    Scrotal pain 05/09/2022    Acute pain in scrotum    Shortness  of breath 12/09/2022    Shortness of breath on exertion    Sleep apnea     cpap    Unspecified cataract 02/22/2017    Cataract of right eye    Unspecified cataract 02/22/2017    Cataract of left eye    Unspecified osteoarthritis, unspecified site     Arthritis       Social History  Social History     Tobacco Use    Smoking status: Never    Smokeless tobacco: Never   Vaping Use    Vaping status: Never Used   Substance Use Topics    Alcohol use: Not Currently     Comment: not for years    Drug use: Never       Family History     Family History   Problem Relation Name Age of Onset    Other (Cardiac disorder) Mother      Cataracts Mother      Hypertension Mother      Other (Cardiac disorder) Father      Stomach cancer Father      Hypertension Father          Allergies:  Allergies   Allergen Reactions    Atorvastatin Myalgia    Flecainide Unknown    Rosuvastatin Myalgia    Statins-Hmg-Coa Reductase Inhibitors Unknown     Statins do not work    Sulfa (Sulfonamide Antibiotics) Unknown    Procainamide Rash        Outpatient Medications:  Current Outpatient Medications   Medication Instructions    amLODIPine (NORVASC) 10 mg, oral, Daily    betamethasone valerate (Valisone) 0.1 % cream Topical, 2 times daily    calcium carbonate-vitamin D3 (Oyster Shell) 250 mg-3.125 mcg (125 unit) tablet 1 tablet, oral, 2 times daily (morning and late afternoon)    carvedilol (COREG) 25 mg, oral, 2 times daily    cholecalciferol, vitamin D3, (VITAMIN D3 ORAL) 10,000 Int'l Units/day, oral, Daily    cyanocobalamin (VITAMIN B-12) 2,000 mcg, oral, Daily    empagliflozin (JARDIANCE) 10 mg, oral, Daily    ezetimibe (ZETIA) 10 mg, oral, Nightly    furosemide (Lasix) 20 mg tablet 2 TABLETS TOGETHER (40 MG) EVERY MON, WEDS AND FRI, 1 TABLET (20 MG) ALL OTHER DAYS OF THE WEEK- ONCE YOU START JARDIANCE THEN CHANGE TO 20 MG DAILY    metFORMIN (GLUCOPHAGE) 500 mg, oral, 2 times daily (morning and late afternoon)    pravastatin (PRAVACHOL) 20 mg, oral,  Daily    sertraline (ZOLOFT) 100 mg, oral, Daily    tamsulosin (FLOMAX) 0.8 mg, oral, Daily    valsartan (DIOVAN) 160 mg, oral, 2 times daily    warfarin (Coumadin) 5 mg tablet As directed to maintain therapeutic INR.  Number of tablets equals 90 day supply.  (Taking 7.5 mg Tues and Thurs, 5 mg all other days of the week)          REVIEW OF SYSTEMS  Review of Systems   All other systems reviewed and are negative.        VITALS  Vitals:    08/29/24 1202   BP: 152/80   Pulse: 84       PHYSICAL EXAM  Constitutional:       Appearance: Healthy appearance. Not in distress.   Neck:      Vascular: No JVR. JVD normal.   Pulmonary:      Effort: Pulmonary effort is normal.      Breath sounds: Normal breath sounds. No wheezing. No rhonchi. No rales.   Chest:      Chest wall: Not tender to palpatation.   Cardiovascular:      PMI at left midclavicular line. Normal rate. Regular rhythm. Normal S1. Normal S2.       Murmurs: There is no murmur.      No gallop.  No click. No rub.      Comments: Device in the left prepectoral healing well.  No signs of hematoma or infection.     Pulses:     Intact distal pulses.   Edema:     Peripheral edema absent.   Abdominal:      General: Bowel sounds are normal.      Palpations: Abdomen is soft.      Tenderness: There is no abdominal tenderness.   Musculoskeletal: Normal range of motion.         General: No tenderness. Skin:     General: Skin is warm and dry.   Neurological:      General: No focal deficit present.      Mental Status: Alert and oriented to person, place and time.           ASSESSMENT AND PLAN  Clinical impression    1.  Sinus node dysfunction  2.  Status post pacemaker implantation  3.  Atrial fibrillation, recurrence  4.  History of high risk medication (flecainide), this medication was discontinued due to shortness of breath  5.  Normal left ventricular function per echocardiogram 2024  6.  Mitral valve disease status post mitral repair with a history of endocarditis  7.   Long-term anticoagulation therapy with warfarin  8.  Hypertension  9.  Diabetes mellitus    Plan recommendations    I had a lengthy discussion with patient about the findings of the device interrogation.  Pacemaker is functioning well but patient shows that he is in atrial fibrillation-atrial flutter at least since May - June 2024.  He is asymptomatic.  We discussed the option of restoration to sinus rhythm versus rate control strategy.  Patient agrees with restoration to sinus rhythm.  Looking at his records his last INR a month ago was 1.7.  INR performed August 29, 2024 3.3.  Patient will be scheduled for MARTINA guided cardioversion in the next few days.  Procedure, risk, benefits and possible complications were explained to patient.  All questions were answered.  Patient agrees with plan.  Informed consent was signed.    Follow my office in 4 to 6 weeks or sooner if needed.    If he has recurrence of atrial fibrillation we can discuss the option of antiarrhythmic therapy.  Baseline EKG has a QT interval close to 500 ms.  May not be able to use dofetilide or sotalol.  The only option could be amiodarone but he has restrictive lung disease.  We may have to discuss the option of rate control study if we cannot use antiarrhythmic therapy.    Follow with device clinic as scheduled.    Risk factor modification and lifestyle modification discussed with patient. Diet , exercise and hydration discussed with patient.    I have personally review with patient during this office visit, laboratory data, echocardiogram results, stress test results, Holter-event monitor results prior and after the last electrophysiology visit. All questions has been answered.    Please excuse any errors in grammar or translation related to this dictation.  Voice recognition software was utilized to prepare this document.

## 2024-08-29 NOTE — PROGRESS NOTES
CARDIOLOGY OFFICE VISIT      CHIEF COMPLAINT  Chief Complaint   Patient presents with    Follow-up     pacemaker       HISTORY OF PRESENT ILLNESS  HPI  79-year-old male with a past medical history of paroxysmal atrial flutter with previous mechanical mitral valve replacement for mitral valve prolapse and endocarditis in 1998, sinus node dysfunction status post permanent pacemaker implantation.  He coronary angiogram at that time showed trivial irregularities in the circumflex only.  Has a history of hypertension diabetes mellitus.  He also has restrictive lung disease T-cell lymphoma diagnosed in 2015.    Back in 2022 he had episodes of shortness of breath that he felt that it was related with atrial flutter.  He was placed on flecainide but he got episodes of shortness of breath that this medication was discontinued.  Symptoms improved.    Patient had the diagnosis of restrictive lung disease in 2023 and followed by pulmonary service.      4/23/2024 underwent left renal mass biopsy and cryoablation no cardiac complications described     Pathology demonstrated oncocytic renal neoplasm  5/13/2024 INR 3.1 CMP normal except glucose 119   CBC/10/24 normal platelet 142.       Patient states that lately he has been doing well.  Denies any symptoms like chest pain or shortness of breath or palpitations.  Patient had a device interrogation in August 19, 2024 by the device clinic.  Device interrogation showed that the patient has been in atrial fibrillation at least since May 2024.  Battery longevity 3.58 years.  [place] of atrial fibrillation 75% of the time.    Echocardiogram May 2024     CONCLUSIONS:      1. Left ventricular systolic function is normal with a 65% estimated ejection fraction.   2. Spectral Doppler shows an impaired relaxation pattern of left ventricular diastolic filling.   3. There is moderate concentric left ventricular hypertrophy.   4. Mild left atrial enlargement.   5. Moderate aortic valve  regurgitation.   6. Normal functioning [facility] MVR.   7. Right-sided pacemaker lead noted.   8. No comparison study available.    Past Medical History  Past Medical History:   Diagnosis Date    Acute myocardial infarction, unspecified (Multi) 11/17/2021    Acute myocardial infarction    Cutaneous T-cell lymphoma (Multi)     Depression     Diabetes mellitus (Multi)     Epididymitis 06/02/2022    Epididymitis, right    Essential (primary) hypertension 12/08/2022    Hypertension    Heart disease     Kidney tumor     Obesity, unspecified 04/04/2022    Class 1 obesity with body mass index (BMI) of 31.0 to 31.9 in adult    Obesity, unspecified 06/08/2022    Class 1 obesity with body mass index (BMI) of 30.0 to 30.9 in adult    Obesity, unspecified 01/10/2022    Class 1 obesity with body mass index (BMI) of 31.0 to 31.9 in adult    Obesity, unspecified 01/31/2022    Class 1 obesity with body mass index (BMI) of 31.0 to 31.9 in adult    Other abnormal findings in urine 05/09/2022    Dark urine    Other specified disorders of kidney and ureter     Bilateral renal masses    Other specified disorders of kidney and ureter 05/09/2022    Renal mass, right    Other specified disorders of the male genital organs 05/09/2022    Swelling of right half of scrotum    Other symptoms and signs involving the musculoskeletal system 11/17/2021    Leg fatigue    Personal history of other diseases of the circulatory system 11/17/2021    History of mitral valve insufficiency    Personal history of other diseases of the respiratory system 12/09/2021    History of acute bronchitis    Personal history of other specified conditions 04/04/2022    History of shortness of breath    Personal history of other specified conditions     History of snoring    Personal history of pneumonia (recurrent) 01/31/2022    History of pneumonia    Prediabetes 06/18/2018    Borderline diabetes mellitus    Scrotal pain 05/09/2022    Acute pain in scrotum    Shortness  of breath 12/09/2022    Shortness of breath on exertion    Sleep apnea     cpap    Unspecified cataract 02/22/2017    Cataract of right eye    Unspecified cataract 02/22/2017    Cataract of left eye    Unspecified osteoarthritis, unspecified site     Arthritis       Social History  Social History     Tobacco Use    Smoking status: Never    Smokeless tobacco: Never   Vaping Use    Vaping status: Never Used   Substance Use Topics    Alcohol use: Not Currently     Comment: not for years    Drug use: Never       Family History     Family History   Problem Relation Name Age of Onset    Other (Cardiac disorder) Mother      Cataracts Mother      Hypertension Mother      Other (Cardiac disorder) Father      Stomach cancer Father      Hypertension Father          Allergies:  Allergies   Allergen Reactions    Atorvastatin Myalgia    Flecainide Unknown    Rosuvastatin Myalgia    Statins-Hmg-Coa Reductase Inhibitors Unknown     Statins do not work    Sulfa (Sulfonamide Antibiotics) Unknown    Procainamide Rash        Outpatient Medications:  Current Outpatient Medications   Medication Instructions    amLODIPine (NORVASC) 10 mg, oral, Daily    betamethasone valerate (Valisone) 0.1 % cream Topical, 2 times daily    calcium carbonate-vitamin D3 (Oyster Shell) 250 mg-3.125 mcg (125 unit) tablet 1 tablet, oral, 2 times daily (morning and late afternoon)    carvedilol (COREG) 25 mg, oral, 2 times daily    cholecalciferol, vitamin D3, (VITAMIN D3 ORAL) 10,000 Int'l Units/day, oral, Daily    cyanocobalamin (VITAMIN B-12) 2,000 mcg, oral, Daily    empagliflozin (JARDIANCE) 10 mg, oral, Daily    ezetimibe (ZETIA) 10 mg, oral, Nightly    furosemide (Lasix) 20 mg tablet 2 TABLETS TOGETHER (40 MG) EVERY MON, WEDS AND FRI, 1 TABLET (20 MG) ALL OTHER DAYS OF THE WEEK- ONCE YOU START JARDIANCE THEN CHANGE TO 20 MG DAILY    metFORMIN (GLUCOPHAGE) 500 mg, oral, 2 times daily (morning and late afternoon)    pravastatin (PRAVACHOL) 20 mg, oral,  Daily    sertraline (ZOLOFT) 100 mg, oral, Daily    tamsulosin (FLOMAX) 0.8 mg, oral, Daily    valsartan (DIOVAN) 160 mg, oral, 2 times daily    warfarin (Coumadin) 5 mg tablet As directed to maintain therapeutic INR.  Number of tablets equals 90 day supply.  (Taking 7.5 mg Tues and Thurs, 5 mg all other days of the week)          REVIEW OF SYSTEMS  Review of Systems   All other systems reviewed and are negative.        VITALS  Vitals:    08/29/24 1202   BP: 152/80   Pulse: 84       PHYSICAL EXAM  Constitutional:       Appearance: Healthy appearance. Not in distress.   Neck:      Vascular: No JVR. JVD normal.   Pulmonary:      Effort: Pulmonary effort is normal.      Breath sounds: Normal breath sounds. No wheezing. No rhonchi. No rales.   Chest:      Chest wall: Not tender to palpatation.   Cardiovascular:      PMI at left midclavicular line. Normal rate. Regular rhythm. Normal S1. Normal S2.       Murmurs: There is no murmur.      No gallop.  No click. No rub.      Comments: Device in the left prepectoral healing well.  No signs of hematoma or infection.     Pulses:     Intact distal pulses.   Edema:     Peripheral edema absent.   Abdominal:      General: Bowel sounds are normal.      Palpations: Abdomen is soft.      Tenderness: There is no abdominal tenderness.   Musculoskeletal: Normal range of motion.         General: No tenderness. Skin:     General: Skin is warm and dry.   Neurological:      General: No focal deficit present.      Mental Status: Alert and oriented to person, place and time.           ASSESSMENT AND PLAN  Clinical impression    1.  Sinus node dysfunction  2.  Status post pacemaker implantation  3.  Atrial fibrillation, recurrence  4.  History of high risk medication (flecainide), this medication was discontinued due to shortness of breath  5.  Normal left ventricular function per echocardiogram 2024  6.  Mitral valve disease status post mitral repair with a history of endocarditis  7.   Long-term anticoagulation therapy with warfarin  8.  Hypertension  9.  Diabetes mellitus    Plan recommendations    I had a lengthy discussion with patient about the findings of the device interrogation.  Pacemaker is functioning well but patient shows that he is in atrial fibrillation-atrial flutter at least since May - June 2024.  He is asymptomatic.  We discussed the option of restoration to sinus rhythm versus rate control strategy.  Patient agrees with restoration to sinus rhythm.  Looking at his records his last INR a month ago was 1.7.  INR performed August 29, 2024 3.3.  Patient will be scheduled for MARTINA guided cardioversion in the next few days.  Procedure, risk, benefits and possible complications were explained to patient.  All questions were answered.  Patient agrees with plan.  Informed consent was signed.    Follow my office in 4 to 6 weeks or sooner if needed.    If he has recurrence of atrial fibrillation we can discuss the option of antiarrhythmic therapy.  Baseline EKG has a QT interval close to 500 ms.  May not be able to use dofetilide or sotalol.  The only option could be amiodarone but he has restrictive lung disease.  We may have to discuss the option of rate control study if we cannot use antiarrhythmic therapy.    Follow with device clinic as scheduled.    Risk factor modification and lifestyle modification discussed with patient. Diet , exercise and hydration discussed with patient.    I have personally review with patient during this office visit, laboratory data, echocardiogram results, stress test results, Holter-event monitor results prior and after the last electrophysiology visit. All questions has been answered.    Please excuse any errors in grammar or translation related to this dictation.  Voice recognition software was utilized to prepare this document.

## 2024-08-30 ENCOUNTER — ANTICOAGULATION - WARFARIN VISIT (OUTPATIENT)
Dept: CARDIOLOGY | Facility: CLINIC | Age: 79
End: 2024-08-30
Payer: MEDICARE

## 2024-08-30 ENCOUNTER — TELEPHONE (OUTPATIENT)
Dept: CARDIOLOGY | Facility: CLINIC | Age: 79
End: 2024-08-30
Payer: MEDICARE

## 2024-08-30 DIAGNOSIS — I10 PRIMARY HYPERTENSION: ICD-10-CM

## 2024-08-30 DIAGNOSIS — Z95.0 PRESENCE OF CARDIAC PACEMAKER: ICD-10-CM

## 2024-08-30 NOTE — TELEPHONE ENCOUNTER
Repeat BMP order placed to be completed before next office visit. Patient was notified.    [facility]

## 2024-08-30 NOTE — TELEPHONE ENCOUNTER
Received call from patient stating he went in for lab work requested by Dr. Ck Celis MD, [facility] (office visit 7/16/24). Per Dr. Ck Celis MD, [facility] he wanted a BMP collected after starting Jardiance. Pt stated he started his Jardiance about 2 weeks ago.     Routed to Dr. Ck Celis MD, [facility] for review. Would you like a repeat BMP to be completed for pt before next appointment?

## 2024-08-30 NOTE — PROGRESS NOTES
Called Mando Tay regarding INR 3.3 with goal 2.5-3.5.  will continue current coumadin dosing and repeat INR 4 weeks approximately 9/25/2024.    No answer, left message to call office with recommendations.

## 2024-09-03 ENCOUNTER — PATIENT MESSAGE (OUTPATIENT)
Dept: CARDIOLOGY | Facility: CLINIC | Age: 79
End: 2024-09-03
Payer: MEDICARE

## 2024-09-04 ENCOUNTER — APPOINTMENT (OUTPATIENT)
Dept: CARDIOLOGY | Facility: CLINIC | Age: 79
End: 2024-09-04
Payer: MEDICARE

## 2024-09-09 ENCOUNTER — TELEPHONE (OUTPATIENT)
Dept: CARDIOLOGY | Facility: CLINIC | Age: 79
End: 2024-09-09
Payer: MEDICARE

## 2024-09-09 ENCOUNTER — TELEPHONE (OUTPATIENT)
Dept: CARDIOLOGY | Facility: HOSPITAL | Age: 79
End: 2024-09-09
Payer: MEDICARE

## 2024-09-09 RX ORDER — SODIUM CHLORIDE 9 MG/ML
20 INJECTION, SOLUTION INTRAVENOUS CONTINUOUS
OUTPATIENT
Start: 2024-09-09

## 2024-09-09 NOTE — TELEPHONE ENCOUNTER
See below from [facility] :    Pt was scheduled for MARTINA and DCC , he was told not to take Jardiance x3 days.  Was schedule by [facility] office .  Please call patient and reschedule holding Jardiace  Solomon Moore      Called patient to inform him scheduling is working on a new procedure date.  He was instructed to hold Jardiance 3 days prior to his procedure.  He was instructed DO NOT miss any doses of Warfarin.  He was instructed to fast 12 hours prior to his procedure- NPO except he could take his a.m. meds with small sip of water (patient instructed to wait until after procedure to take his Furosemide).  All instructions given to patient and his wife by phone.  Both verbalized understanding.  Tiera Francis, CMA

## 2024-09-09 NOTE — TELEPHONE ENCOUNTER
Spoke with patient today regarding pre procedure orders.  Patient had 2 concerns.      First- patient has started taking Jardiance as of 8/16/24.  [facility]  Patient states he has not had labs checked to date after starting it.  He states when the medication was ordered in July,  Dr. Ck Celis [facility] wanted him to have labs 2 weeks after.  Patient states he has been on Jardiance for almost a month.      Second- patient states he is confused as to why he needs 2 different cardiologists.  He states that Dr. Ck Celis [facility].A[facility]C.C[facility] used to do all his procedures.  Note to Dr. Ck Celis [facility]C.[facility] for review.  Tiera Francis, CMA

## 2024-09-10 ENCOUNTER — TELEPHONE (OUTPATIENT)
Dept: CARDIOLOGY | Facility: CLINIC | Age: 79
End: 2024-09-10
Payer: MEDICARE

## 2024-09-10 ENCOUNTER — APPOINTMENT (OUTPATIENT)
Dept: CARDIOLOGY | Facility: HOSPITAL | Age: 79
End: 2024-09-10
Payer: MEDICARE

## 2024-09-10 NOTE — TELEPHONE ENCOUNTER
I spoke to patient by telephone.  Gave him date of 9/27/24  for his MARTINA / DCC.  Explained to patient that he will get a call with time the day before.

## 2024-09-12 ENCOUNTER — OFFICE VISIT (OUTPATIENT)
Dept: UROLOGY | Facility: CLINIC | Age: 79
End: 2024-09-12
Payer: MEDICARE

## 2024-09-12 ENCOUNTER — APPOINTMENT (OUTPATIENT)
Dept: UROLOGY | Facility: CLINIC | Age: 79
End: 2024-09-12
Payer: MEDICARE

## 2024-09-12 VITALS
SYSTOLIC BLOOD PRESSURE: 151 MMHG | HEART RATE: 74 BPM | DIASTOLIC BLOOD PRESSURE: 79 MMHG | WEIGHT: 213.6 LBS | TEMPERATURE: 97.9 F | BODY MASS INDEX: 30.65 KG/M2

## 2024-09-12 DIAGNOSIS — R97.20 ELEVATED PSA: ICD-10-CM

## 2024-09-12 PROCEDURE — 1159F MED LIST DOCD IN RCRD: CPT | Performed by: UROLOGY

## 2024-09-12 PROCEDURE — 3078F DIAST BP <80 MM HG: CPT | Performed by: UROLOGY

## 2024-09-12 PROCEDURE — G2211 COMPLEX E/M VISIT ADD ON: HCPCS | Performed by: UROLOGY

## 2024-09-12 PROCEDURE — 99214 OFFICE O/P EST MOD 30 MIN: CPT | Performed by: UROLOGY

## 2024-09-12 PROCEDURE — 3077F SYST BP >= 140 MM HG: CPT | Performed by: UROLOGY

## 2024-09-12 NOTE — PROGRESS NOTES
Subjective   Patient ID: Mando Tay is a 79 y.o. male who presents for Elevated PSA (Last PSA: 8.17 on 8/29). Last seen 7/25/24 when We discussed the CT results which look good. Order CMP, CT Renal Mass, and PSA in 9 months and follow-up with results.      HPI  Patient denies new urinary symptoms. His flow is fine although is a little slow in the morning. Patient denies hematuria and dysuria. Patient notes he is not having trouble with urgency, control, or frequency. PSA is elevated.     The patient cannot have MRI because of a pacemaker. Patient has an appointment on September 27 for an Afib evaluation and heart ablation by Dr. Fair. His primary cardiologist is Dr. Ck Celis. Patient is on warfarin 5 mg 5 days week and 7 mg on Mondays and Fridays. His next appointment with Dr. Celis is in early October.      PSA Results  Component  Ref Range & Units 2 wk ago 1 yr ago 2 yr ago   Prostate Specific AG  <=4.00 ng/mL 8.17 High  4.59 High  R, CM 2.60 R, CM     Review of Systems  A 12 system review was completed and is negative with the exception of those signs and symptoms noted in the history of present illness.    Objective   Physical Exam  General: in NAD, appears stated age  Head: normocephalic, atraumatic  Respiratory: normal effort, no use of accessory muscles  Cardiovascular: no edema noted  Skin: normal turgor, no rashes  Neurologic: grossly intact, oriented to person/place/time  Psychiatric: mode and affect appropriate     Assessment/Plan   Problem List Items Addressed This Visit    None  Visit Diagnoses         Codes    Elevated PSA     R97.20    Relevant Orders    Follow Up In Urology          Patient's PSA is elevated. He is not able to have an MRI because of a pacemaker. In consideration of his cardiac treatments we will schedule IO prostate biopsy for the end of October. I will message Dr. Celis to discuss the patient being off of Coumadin for the procedure. Schedule IO prostate biopsy for the end of  October. The patient will discontinue warfarin 5 days and start Lovenox.     Follow-up IO prostate biopsy at the end of October for continued management of elevated PSA.    Scribe Attestation  By signing my name below, I, Lucinda Montano   attest that this documentation has been prepared under the direction and in the presence of Rafael Harp MD.

## 2024-09-17 ENCOUNTER — TRANSCRIBE ORDERS (OUTPATIENT)
Dept: CARDIOLOGY | Facility: CLINIC | Age: 79
End: 2024-09-17
Payer: MEDICARE

## 2024-09-17 DIAGNOSIS — Z95.0 PRESENCE OF CARDIAC PACEMAKER: ICD-10-CM

## 2024-09-18 ENCOUNTER — HOSPITAL ENCOUNTER (OUTPATIENT)
Dept: RADIOLOGY | Facility: HOSPITAL | Age: 79
Discharge: HOME | End: 2024-09-18
Payer: MEDICARE

## 2024-09-18 DIAGNOSIS — J98.4 OTHER DISORDERS OF LUNG: ICD-10-CM

## 2024-09-18 PROCEDURE — 76000 FLUOROSCOPY <1 HR PHYS/QHP: CPT

## 2024-09-25 ENCOUNTER — LAB (OUTPATIENT)
Dept: LAB | Facility: LAB | Age: 79
End: 2024-09-25
Payer: MEDICARE

## 2024-09-25 ENCOUNTER — HOSPITAL ENCOUNTER (OUTPATIENT)
Dept: RADIOLOGY | Facility: CLINIC | Age: 79
Discharge: HOME | End: 2024-09-25
Payer: MEDICARE

## 2024-09-25 DIAGNOSIS — R06.02 SHORTNESS OF BREATH: ICD-10-CM

## 2024-09-25 DIAGNOSIS — Z95.0 PRESENCE OF CARDIAC PACEMAKER: ICD-10-CM

## 2024-09-25 DIAGNOSIS — I10 PRIMARY HYPERTENSION: ICD-10-CM

## 2024-09-25 DIAGNOSIS — Z79.01 ANTICOAGULANT LONG-TERM USE: Primary | ICD-10-CM

## 2024-09-25 DIAGNOSIS — Z79.01 ANTICOAGULANT LONG-TERM USE: ICD-10-CM

## 2024-09-25 LAB
ANION GAP SERPL CALC-SCNC: 9 MMOL/L (ref 10–20)
BUN SERPL-MCNC: 26 MG/DL (ref 6–23)
CALCIUM SERPL-MCNC: 9.3 MG/DL (ref 8.6–10.3)
CHLORIDE SERPL-SCNC: 103 MMOL/L (ref 98–107)
CO2 SERPL-SCNC: 32 MMOL/L (ref 21–32)
CREAT SERPL-MCNC: 1.3 MG/DL (ref 0.5–1.3)
EGFRCR SERPLBLD CKD-EPI 2021: 56 ML/MIN/1.73M*2
GLUCOSE SERPL-MCNC: 131 MG/DL (ref 74–99)
INR PPP: 3.6 (ref 0.9–1.1)
POTASSIUM SERPL-SCNC: 4.1 MMOL/L (ref 3.5–5.3)
PROTHROMBIN TIME: 41.1 SECONDS (ref 9.8–12.8)
SODIUM SERPL-SCNC: 140 MMOL/L (ref 136–145)

## 2024-09-25 PROCEDURE — 85610 PROTHROMBIN TIME: CPT

## 2024-09-25 PROCEDURE — 80048 BASIC METABOLIC PNL TOTAL CA: CPT

## 2024-09-25 PROCEDURE — 71250 CT THORAX DX C-: CPT | Performed by: RADIOLOGY

## 2024-09-25 PROCEDURE — 71250 CT THORAX DX C-: CPT

## 2024-09-25 PROCEDURE — 36415 COLL VENOUS BLD VENIPUNCTURE: CPT

## 2024-09-26 ENCOUNTER — ANTICOAGULATION - WARFARIN VISIT (OUTPATIENT)
Dept: CARDIOLOGY | Facility: CLINIC | Age: 79
End: 2024-09-26
Payer: MEDICARE

## 2024-09-26 NOTE — PROGRESS NOTES
Called and spoke with Home Tay regarding INR 3.6 with goal of 2.5 - 3.6.  He states that he is taking his Coumadin differently than I have documented.  He is currently taking Coumadin 7.5 mg on Mon and Thurs with 5 mg all other days.  Will change current coumadin dose to 7.5 mg on Monday only with 5 mg all other days.    Repeat INR in 2 weeks approximately 10/10/2024

## 2024-09-27 ENCOUNTER — HOSPITAL ENCOUNTER (OUTPATIENT)
Dept: CARDIOLOGY | Facility: HOSPITAL | Age: 79
Discharge: HOME | End: 2024-09-27
Payer: MEDICARE

## 2024-09-27 ENCOUNTER — ANESTHESIA EVENT (OUTPATIENT)
Dept: CARDIOLOGY | Facility: HOSPITAL | Age: 79
End: 2024-09-27
Payer: MEDICARE

## 2024-09-27 ENCOUNTER — ANESTHESIA (OUTPATIENT)
Dept: CARDIOLOGY | Facility: HOSPITAL | Age: 79
End: 2024-09-27
Payer: MEDICARE

## 2024-09-27 VITALS
SYSTOLIC BLOOD PRESSURE: 138 MMHG | OXYGEN SATURATION: 92 % | RESPIRATION RATE: 18 BRPM | TEMPERATURE: 36.4 F | DIASTOLIC BLOOD PRESSURE: 72 MMHG | HEART RATE: 59 BPM

## 2024-09-27 VITALS
DIASTOLIC BLOOD PRESSURE: 67 MMHG | BODY MASS INDEX: 30.68 KG/M2 | HEART RATE: 60 BPM | RESPIRATION RATE: 18 BRPM | OXYGEN SATURATION: 96 % | SYSTOLIC BLOOD PRESSURE: 124 MMHG | TEMPERATURE: 97.5 F | HEIGHT: 70 IN | WEIGHT: 214.29 LBS

## 2024-09-27 DIAGNOSIS — I48.91 UNSPECIFIED ATRIAL FIBRILLATION (MULTI): ICD-10-CM

## 2024-09-27 DIAGNOSIS — Z95.0 PRESENCE OF CARDIAC PACEMAKER: ICD-10-CM

## 2024-09-27 LAB
ANION GAP SERPL CALC-SCNC: 13 MMOL/L (ref 10–20)
APTT PPP: 46 SECONDS (ref 27–38)
BUN SERPL-MCNC: 27 MG/DL (ref 6–23)
CALCIUM SERPL-MCNC: 9.3 MG/DL (ref 8.6–10.3)
CHLORIDE SERPL-SCNC: 103 MMOL/L (ref 98–107)
CO2 SERPL-SCNC: 28 MMOL/L (ref 21–32)
CREAT SERPL-MCNC: 1.3 MG/DL (ref 0.5–1.3)
EGFRCR SERPLBLD CKD-EPI 2021: 56 ML/MIN/1.73M*2
EJECTION FRACTION: 55 %
GLUCOSE SERPL-MCNC: 134 MG/DL (ref 74–99)
INR PPP: 3.5 (ref 0.9–1.1)
POTASSIUM SERPL-SCNC: 3.6 MMOL/L (ref 3.5–5.3)
PROTHROMBIN TIME: 39.9 SECONDS (ref 9.8–12.8)
SODIUM SERPL-SCNC: 140 MMOL/L (ref 136–145)

## 2024-09-27 PROCEDURE — 93312 ECHO TRANSESOPHAGEAL: CPT | Performed by: INTERNAL MEDICINE

## 2024-09-27 PROCEDURE — 2500000005 HC RX 250 GENERAL PHARMACY W/O HCPCS: Performed by: INTERNAL MEDICINE

## 2024-09-27 PROCEDURE — 93005 ELECTROCARDIOGRAM TRACING: CPT

## 2024-09-27 PROCEDURE — 36415 COLL VENOUS BLD VENIPUNCTURE: CPT | Performed by: NURSE PRACTITIONER

## 2024-09-27 PROCEDURE — 85730 THROMBOPLASTIN TIME PARTIAL: CPT | Performed by: NURSE PRACTITIONER

## 2024-09-27 PROCEDURE — 93280 PM DEVICE PROGR EVAL DUAL: CPT | Performed by: INTERNAL MEDICINE

## 2024-09-27 PROCEDURE — 93325 DOPPLER ECHO COLOR FLOW MAPG: CPT | Performed by: INTERNAL MEDICINE

## 2024-09-27 PROCEDURE — 93320 DOPPLER ECHO COMPLETE: CPT

## 2024-09-27 PROCEDURE — 3700000001 HC GENERAL ANESTHESIA TIME - INITIAL BASE CHARGE: Performed by: INTERNAL MEDICINE

## 2024-09-27 PROCEDURE — 2500000004 HC RX 250 GENERAL PHARMACY W/ HCPCS (ALT 636 FOR OP/ED): Performed by: NURSE PRACTITIONER

## 2024-09-27 PROCEDURE — 2500000004 HC RX 250 GENERAL PHARMACY W/ HCPCS (ALT 636 FOR OP/ED)

## 2024-09-27 PROCEDURE — 7100000002 HC RECOVERY ROOM TIME - EACH INCREMENTAL 1 MINUTE

## 2024-09-27 PROCEDURE — 93320 DOPPLER ECHO COMPLETE: CPT | Performed by: INTERNAL MEDICINE

## 2024-09-27 PROCEDURE — 82374 ASSAY BLOOD CARBON DIOXIDE: CPT | Performed by: NURSE PRACTITIONER

## 2024-09-27 PROCEDURE — 7100000001 HC RECOVERY ROOM TIME - INITIAL BASE CHARGE

## 2024-09-27 PROCEDURE — 3700000002 HC GENERAL ANESTHESIA TIME - EACH INCREMENTAL 1 MINUTE: Performed by: INTERNAL MEDICINE

## 2024-09-27 PROCEDURE — 7100000002 HC RECOVERY ROOM TIME - EACH INCREMENTAL 1 MINUTE: Performed by: INTERNAL MEDICINE

## 2024-09-27 PROCEDURE — 7100000001 HC RECOVERY ROOM TIME - INITIAL BASE CHARGE: Performed by: INTERNAL MEDICINE

## 2024-09-27 PROCEDURE — 2500000004 HC RX 250 GENERAL PHARMACY W/ HCPCS (ALT 636 FOR OP/ED): Performed by: INTERNAL MEDICINE

## 2024-09-27 RX ORDER — SODIUM CHLORIDE 9 MG/ML
20 INJECTION, SOLUTION INTRAVENOUS CONTINUOUS
Status: DISCONTINUED | OUTPATIENT
Start: 2024-09-27 | End: 2024-09-28 | Stop reason: HOSPADM

## 2024-09-27 RX ORDER — FENTANYL CITRATE 50 UG/ML
INJECTION, SOLUTION INTRAMUSCULAR; INTRAVENOUS AS NEEDED
Status: DISCONTINUED | OUTPATIENT
Start: 2024-09-27 | End: 2024-09-27 | Stop reason: HOSPADM

## 2024-09-27 RX ORDER — LIDOCAINE HYDROCHLORIDE 20 MG/ML
JELLY TOPICAL AS NEEDED
Status: DISCONTINUED | OUTPATIENT
Start: 2024-09-27 | End: 2024-09-27 | Stop reason: HOSPADM

## 2024-09-27 RX ORDER — MIDAZOLAM HYDROCHLORIDE 1 MG/ML
INJECTION, SOLUTION INTRAMUSCULAR; INTRAVENOUS AS NEEDED
Status: DISCONTINUED | OUTPATIENT
Start: 2024-09-27 | End: 2024-09-27 | Stop reason: HOSPADM

## 2024-09-27 RX ORDER — PROPOFOL 10 MG/ML
INJECTION, EMULSION INTRAVENOUS AS NEEDED
Status: DISCONTINUED | OUTPATIENT
Start: 2024-09-27 | End: 2024-09-27

## 2024-09-27 ASSESSMENT — PAIN SCALES - GENERAL
PAINLEVEL_OUTOF10: 0 - NO PAIN

## 2024-09-27 ASSESSMENT — COLUMBIA-SUICIDE SEVERITY RATING SCALE - C-SSRS
1. IN THE PAST MONTH, HAVE YOU WISHED YOU WERE DEAD OR WISHED YOU COULD GO TO SLEEP AND NOT WAKE UP?: NO
2. HAVE YOU ACTUALLY HAD ANY THOUGHTS OF KILLING YOURSELF?: NO
6. HAVE YOU EVER DONE ANYTHING, STARTED TO DO ANYTHING, OR PREPARED TO DO ANYTHING TO END YOUR LIFE?: NO

## 2024-09-27 ASSESSMENT — PAIN - FUNCTIONAL ASSESSMENT
PAIN_FUNCTIONAL_ASSESSMENT: 0-10

## 2024-09-27 NOTE — NURSING NOTE
Patient back from his MARTINA which was negative. VSS, no pain. Patient being prepped for his DCC at this time. Call light in reach, will continue to monitor.

## 2024-09-27 NOTE — NURSING NOTE
Cardioversion complete. Patient in NSR at this time. No complaints of pain, VSS. Call light in reach with family at bedside.

## 2024-09-27 NOTE — ANESTHESIA PREPROCEDURE EVALUATION
Patient: Mando Tay    Procedure Information       Date/Time: 09/27/24 1045    Scheduled providers: Devan Fair MD    Procedure: CARDIOVERSION EXTERNAL    Location: [facility]            Relevant Problems   Cardiac   (+) Atypical atrial flutter (Multi)   (+) Hyperlipidemia   (+) Mitral valve disease   (+) Paroxysmal atrial fibrillation (Multi)   (+) Paroxysmal atrial flutter (Multi)   (+) Presence of cardiac pacemaker   (+) Primary hypertension   (+) Sick sinus syndrome (Multi)      Pulmonary   (+) RAMSES (obstructive sleep apnea)      Neuro   (+) Atypical depressive disorder   (+) Major depressive disorder with single episode, in full remission (CMS-HCC)      GI   (+) Dysphagia      /Renal   (+) BPH with obstruction/lower urinary tract symptoms      Endocrine   (+) Class 1 obesity with body mass index (BMI) of 31.0 to 31.9 in adult      Hematology   (+) Anticoagulant long-term use   (+) Cutaneous T-cell lymphoma (Multi)      Musculoskeletal   (+) Cervical spondylosis without myelopathy   (+) Chondromalacia of patella   (+) Degenerative cervical spinal stenosis   (+) Lumbar stenosis with neurogenic claudication   (+) Primary localized osteoarthrosis of shoulder region      HEENT   (+) Bilateral sensorineural hearing loss      ID   (+) Acute bronchitis due to other specified organisms   (+) Viral wart, unspecified      Skin   (+) Pigmented purpuric dermatosis   (+) Superficial basal cell carcinoma of skin of left shoulder       Clinical information reviewed:     Meds               NPO Detail:  NPO/Void Status  Date of Last Liquid: 09/27/24  Time of Last Liquid: 0700  Date of Last Solid: 09/26/24  Time of Last Solid: 2330  Last Intake Type: Clear fluids  Time of Last Void: 0700         Physical Exam    Airway  Mallampati: II     Cardiovascular   Rhythm: regular  Rate: normal     Dental    Pulmonary - normal exam     Abdominal - normal exam             Anesthesia Plan    History of general  anesthesia?: yes  History of complications of general anesthesia?: no    ASA 3     MAC     intravenous induction   Postoperative administration of opioids is intended.  Anesthetic plan and risks discussed with patient.

## 2024-09-27 NOTE — NURSING NOTE
Patient and spouse verbalized understanding of discharge instructions, medications, and follow up appointments. VSS, up steady. Post EKG  complete. IV out. No complaints of pain. All belongings sent home with patient. Patient discharged via wheelchair to home.

## 2024-09-27 NOTE — ANESTHESIA POSTPROCEDURE EVALUATION
Patient: Mando Tay    Procedure Summary       Date: 09/27/24 Room / Location: [facility]    Anesthesia Start: 1123 Anesthesia Stop:     Procedure: CARDIOVERSION EXTERNAL Diagnosis: Presence of cardiac pacemaker    Scheduled Providers: Devan Fair MD Responsible Provider: Kelby Crockett DO    Anesthesia Type: MAC ASA Status: 3            Anesthesia Type: MAC      Anesthesia Post Evaluation    Patient location during evaluation: PACU  Patient participation: complete - patient participated  Level of consciousness: awake  Pain management: adequate  Multimodal analgesia pain management approach  Airway patency: patent  Cardiovascular status: acceptable  Respiratory status: acceptable and nasal cannula  Hydration status: acceptable  Postoperative Nausea and Vomiting: none        No notable events documented.

## 2024-09-29 LAB
ATRIAL RATE: 258 BPM
ATRIAL RATE: 87 BPM
PR INTERVAL: 238 MS
Q ONSET: 193 MS
Q ONSET: 194 MS
QRS COUNT: 13 BEATS
QRS COUNT: 9 BEATS
QRS DURATION: 172 MS
QRS DURATION: 180 MS
QT INTERVAL: 456 MS
QT INTERVAL: 518 MS
QTC CALCULATION(BAZETT): 518 MS
QTC CALCULATION(BAZETT): 525 MS
QTC FREDERICIA: 502 MS
QTC FREDERICIA: 518 MS
R AXIS: 102 DEGREES
R AXIS: 122 DEGREES
T AXIS: 55 DEGREES
T AXIS: 8 DEGREES
T OFFSET: 422 MS
T OFFSET: 452 MS
VENTRICULAR RATE: 60 BPM
VENTRICULAR RATE: 80 BPM

## 2024-10-01 ENCOUNTER — OFFICE VISIT (OUTPATIENT)
Dept: CARDIOLOGY | Facility: CLINIC | Age: 79
End: 2024-10-01
Payer: MEDICARE

## 2024-10-01 VITALS
BODY MASS INDEX: 30.58 KG/M2 | DIASTOLIC BLOOD PRESSURE: 64 MMHG | HEIGHT: 70 IN | SYSTOLIC BLOOD PRESSURE: 132 MMHG | HEART RATE: 71 BPM | WEIGHT: 213.6 LBS

## 2024-10-01 DIAGNOSIS — I49.1 PAC (PREMATURE ATRIAL CONTRACTION): ICD-10-CM

## 2024-10-01 DIAGNOSIS — R06.02 SHORTNESS OF BREATH: ICD-10-CM

## 2024-10-01 DIAGNOSIS — I05.9 MITRAL VALVE DISEASE: ICD-10-CM

## 2024-10-01 DIAGNOSIS — I50.32 CHRONIC DIASTOLIC CONGESTIVE HEART FAILURE, NYHA CLASS 2: ICD-10-CM

## 2024-10-01 DIAGNOSIS — I48.4 ATYPICAL ATRIAL FLUTTER (MULTI): ICD-10-CM

## 2024-10-01 DIAGNOSIS — I10 PRIMARY HYPERTENSION: ICD-10-CM

## 2024-10-01 DIAGNOSIS — I48.0 PAROXYSMAL ATRIAL FIBRILLATION (MULTI): Primary | ICD-10-CM

## 2024-10-01 DIAGNOSIS — I25.10 CORONARY ARTERY DISEASE INVOLVING NATIVE CORONARY ARTERY OF NATIVE HEART WITHOUT ANGINA PECTORIS: ICD-10-CM

## 2024-10-01 PROBLEM — I71.21 ASCENDING AORTIC ANEURYSM (CMS-HCC): Status: ACTIVE | Noted: 2024-10-01

## 2024-10-01 PROCEDURE — 3078F DIAST BP <80 MM HG: CPT | Performed by: INTERNAL MEDICINE

## 2024-10-01 PROCEDURE — 93000 ELECTROCARDIOGRAM COMPLETE: CPT | Performed by: INTERNAL MEDICINE

## 2024-10-01 PROCEDURE — 99215 OFFICE O/P EST HI 40 MIN: CPT | Performed by: INTERNAL MEDICINE

## 2024-10-01 PROCEDURE — 3075F SYST BP GE 130 - 139MM HG: CPT | Performed by: INTERNAL MEDICINE

## 2024-10-01 PROCEDURE — G2211 COMPLEX E/M VISIT ADD ON: HCPCS | Performed by: INTERNAL MEDICINE

## 2024-10-01 PROCEDURE — 1159F MED LIST DOCD IN RCRD: CPT | Performed by: INTERNAL MEDICINE

## 2024-10-01 RX ORDER — ISOSORBIDE MONONITRATE 30 MG/1
30 TABLET, EXTENDED RELEASE ORAL DAILY
Qty: 90 TABLET | Refills: 1 | Status: SHIPPED | OUTPATIENT
Start: 2024-10-01 | End: 2025-10-01

## 2024-10-01 RX ORDER — VALSARTAN 160 MG/1
160 TABLET ORAL 2 TIMES DAILY
Qty: 180 TABLET | Refills: 1 | Status: SHIPPED | OUTPATIENT
Start: 2024-10-01 | End: 2025-03-30

## 2024-10-01 RX ORDER — EZETIMIBE 10 MG/1
10 TABLET ORAL NIGHTLY
Qty: 90 TABLET | Refills: 1 | Status: SHIPPED | OUTPATIENT
Start: 2024-10-01

## 2024-10-01 RX ORDER — CARVEDILOL 25 MG/1
25 TABLET ORAL 2 TIMES DAILY
Qty: 180 TABLET | Refills: 1 | Status: SHIPPED | OUTPATIENT
Start: 2024-10-01 | End: 2025-10-01

## 2024-10-01 RX ORDER — DILTIAZEM HYDROCHLORIDE 180 MG/1
180 CAPSULE, COATED, EXTENDED RELEASE ORAL DAILY
Qty: 90 CAPSULE | Refills: 1 | Status: SHIPPED | OUTPATIENT
Start: 2024-10-01 | End: 2025-10-01

## 2024-10-01 RX ORDER — FUROSEMIDE 20 MG/1
20 TABLET ORAL DAILY
Qty: 90 TABLET | Refills: 1 | Status: SHIPPED | OUTPATIENT
Start: 2024-10-01 | End: 2025-03-30

## 2024-10-01 RX ORDER — PRAVASTATIN SODIUM 20 MG/1
20 TABLET ORAL DAILY
Qty: 90 TABLET | Refills: 1 | Status: SHIPPED | OUTPATIENT
Start: 2024-10-01 | End: 2025-03-30

## 2024-10-01 NOTE — PROGRESS NOTES
Patient:  Mando Tay  YOB: 1945  MRN: [id number]       Impression/Plan:     Diagnoses and all orders for this visit:  Paroxysmal atrial fibrillation (Multi)  Atypical atrial flutter (Multi)  PAC (premature atrial contraction)  -    Since cardioversion he notes no different whatsoever from when he was in A-fib and pacing most of the time.  In fact he is more symptomatic than he was previously asked to palpitation  -    His palpitations are related to PACs.  Interestingly when he was in A-fib most of the time he was pacing and his rhythm was actually more regular.  -    To reduce the PACs will switch his amlodipine to Cardizem in hopes of suppression.  -    In light of his lung disease I do not believe he should be put on amiodarone and probably not Multaq either.  Other agents would likely increase his QT which is already nearly 550 paced.  -    He would be reluctant to consider repeat cardioversions and that he actually says he felt better when in A-fib flutter.  He is not an optimal candidate for consideration of ablations secondary to his mechanical mitral valve although it is not a contraindication should he become more symptomatic.    Primary hypertension  -     Currently well-controlled and not contributing to his dyspnea.    Shortness of breath  Coronary artery disease involving native coronary artery of native heart without angina pectoris  Chronic diastolic congestive heart failure, NYHA class 2  -     His shortness of breath remains somewhat problematic  -     He does have diastolic dysfunction also pulmonary disease.  Given lack of change in symptoms with conversion to sinus I do not think his A-fib was the main contributor to his chronic dyspnea.  -     Although remote coronary angiography was unrevealing for coronary disease recent CT scan of the chest demonstrates diffuse coronary calcification.  He has no angina.  Will initially pursue Lexiscan perfusion study considering more invasive  investigation depending upon results.  -     Jardiance has been well-tolerated continue.  When Jardiance was added and diuretics adjusted his breathing did improve although still with some dyspnea    Mitral valve disease  -     Excellent function on MARTINA last week continue full anticoagulation for mechanical valve  -     He tells me that he will be having prostate biopsy sometime before the end of the year and would need to be on Lovenox for bridging  -     Reviewed results of MARTINA with him and wife in detail    Ascending aortic aneurysm: Quite small continue control of blood pressure and pursue annual assessment          Chief Complaint/Active Symptoms:       Mando Tay is a 79 y.o. male who presents with paroxysmal atrial flutter, previous mechanical mitral valve replacement for mitral valve prolapse and endocarditis in 1998, sick sinus syndrome with permanent pacemaker.  Coronary angiography at that time with trivial irregularities circumflex only.  He has longstanding hypertension and diabetes.  From medical standpoint also with history of restrictive lung disease, T-cell lymphoma diagnosed in 2015.  Does have moderately severe restrictive lung disease     FALL 2022 he had symptoms of shortness of breath and CHF felt related to atrial flutter. He was put on flecainide cardioverted initially felt better but then further deteriorated had episodes of pneumonia some unexplained shortness of breath with eventually was felt related to flecainide.. He felt better off the flecainide which was discontinued in April. There is a 5-10% incidence of sensation of dyspnea associated with flecainide. Then became ill again with orchiditis. Subsequent CT scan demonstrated a right renal cyst and a left renal mass. He has since seen urology who feels it very likely could be malignancy but because of its size metastatic progression relatively unlikely and and serial imaging is planned.      Seen in pulmonary medicine office  10/17/2023 at which time the moderately severe restrictive lung disease described.  Recommended he follow-up with cardiology for control of A-fib and hypertension though there was no evidence of A-fib at that office visit but blood pressure was about 150 systolic at that time.       4/23/2024 underwent left renal mass biopsy and cryoablation no cardiac complications described     Pathology demonstrated oncocytic renal neoplasm.      I had last seen him 7/16/2024.  At that time he was feeling better with adjustment of diuretic and was then begun on Jardiance for his diabetes as well as stage II diastolic CHF.  Also referred to Dr. Fair for further monitoring of his pacemaker at that time.  He was described as being asymptomatic at office visit 8/29/2024 when seen by Dr. Fair.  Was described as device check showing 75% the time in atrial fibrillation and he was in atrial fibrillation at that time.  Heart rate well-controlled 84.  Although asymptomatic plan was to consider direct-current cardioversion.  Antiarrhythmic option was thought to be amiodarone though concerning given his history of restrictive lung disease.    On 9/27/2024 patient went direct-current cardioversion after transesophageal echo.  Study showed a small perivalvular mitral valve leak with otherwise normal valve function.  Spontaneous echo contrast in atrium without thrombus trivial AI normal LV function and a small PFO.  He underwent cardioversion on 9/27/2024 successfully to sinus rhythm.    Since his cardioversion 3 days ago he says he does not feel any different as to breathing still gets short of breath walking to the end of his driveway but is more concerned because he feels worse as to palpitation and extra beats.  He feels as though he is in atrial fibrillation or flutter again because he has frequent extra beats.  Indeed on exam he has extrasystoles and ECG shows his symptoms to be related to PACs.  He has had no chest tightness or pressure.   He has no significant lower extremity edema.  He is sleeping well he is eating well.  He just overall does not feel the best he clearly notes no improvement being in sinus rhythm which at this time is dual-chamber pacing compared to when he was pacing with underlying atrial fibrillation.    Transesophageal echo showed normally functioning valve and normal LV function.    As part of his pulmonary workup he had CT scan of the chest 9/25/2024 which was revealing for significant coronary calcification although not dated to assess for coronary disease specifically.  Ascending aorta at 42 mm as well.    ECG 10/1/2024 dual-chamber pacing.  Paced QTc 524 ms.  Frequent PACs on rhythm strip               Review of Systems: Unremarkable except as noted above    Meds     Current Outpatient Medications   Medication Instructions    betamethasone valerate (Valisone) 0.1 % cream Topical, 2 times daily    calcium carbonate-vitamin D3 (Oyster Shell) 250 mg-3.125 mcg (125 unit) tablet 1 tablet, oral, 2 times daily (morning and late afternoon)    carvedilol (COREG) 25 mg, oral, 2 times daily    cholecalciferol, vitamin D3, (VITAMIN D3 ORAL) 10,000 Int'l Units/day, oral, Daily    cyanocobalamin (VITAMIN B-12) 2,000 mcg, oral, Daily    dilTIAZem CD (CARDIZEM CD) 180 mg, oral, Daily    empagliflozin (Jardiance) 25 mg 0.5 tablets, oral, Daily    ezetimibe (ZETIA) 10 mg, oral, Nightly    furosemide (LASIX) 20 mg, oral, Daily    isosorbide mononitrate ER (IMDUR) 30 mg, oral, Daily    metFORMIN (GLUCOPHAGE) 500 mg, oral, 2 times daily (morning and late afternoon)    pravastatin (PRAVACHOL) 20 mg, oral, Daily    sertraline (ZOLOFT) 100 mg, oral, Daily    tamsulosin (FLOMAX) 0.8 mg, oral, Daily    valsartan (DIOVAN) 160 mg, oral, 2 times daily    warfarin (Coumadin) 5 mg tablet As directed to maintain therapeutic INR.  Number of tablets equals 90 day supply.  (Taking 7.5 mg Tues and Thurs, 5 mg all other days of the week)        Allergies      Allergies   Allergen Reactions    Atorvastatin Myalgia    Flecainide Unknown    Rosuvastatin Myalgia    Statins-Hmg-Coa Reductase Inhibitors Unknown     Statins do not work    Sulfa (Sulfonamide Antibiotics) Unknown    Procainamide Rash         Annotated Problems     Specialty Problems          Cardiology Problems    History of mitral valve replacement     1998 [employer] mechanical mitral valve replacement         Presence of cardiac pacemaker      2/18/15 generator change to Medtronic Adapta L       10/04 Generator change to Medtronic [place] DR [id number]      1998 first device (pacesetter) placed due to CHB after MVR surgery.         Mitral valve disease     · 12/7/2021 echo LVEF 60%. LVH. RVSP 44 mm. 2+ AI unchanged. Normal function      mechanical mitral valve       History of mitral valve prolapse with mitral valve replacement mechanical [employer]        valve 1998 secondary to severe MR and endocarditis   History of mitral valve prolapse with mitral valve replacement mechanical [employer]      valve 1998 secondary to severe MR and endocarditis      Late 1997 MI- Felt due to emboli from mitral valve vegetation.         Primary hypertension    Atypical atrial flutter (Multi)    Anticoagulant long-term use    Hyperlipidemia      · 1997 trivial coronary irreg in cx. normal LV, severe MR w/subsequent MVR.          Paroxysmal atrial fibrillation (Multi)    Paroxysmal atrial flutter (Multi)    Sick sinus syndrome (Multi)    Chronic diastolic congestive heart failure, NYHA class 2    Never smoked cigarettes    Ascending aortic aneurysm (CMS-HCC)     CT scan 9/27/2024: Ascending aorta 42 mm         Coronary artery disease involving native coronary artery of native heart without angina pectoris        Problem List     Patient Active Problem List    Diagnosis Date Noted    Coronary artery disease involving native coronary artery of native heart without angina pectoris 10/01/2024    Ascending aortic aneurysm (CMS-HCC)  10/01/2024    Medicare annual wellness visit, subsequent 06/06/2024    Never smoked cigarettes 06/06/2024    Pre-operative clearance 04/04/2024    Chronic diastolic congestive heart failure, NYHA class 2 10/30/2023    Medication dose changed 10/30/2023    Adrenal nodule 10/01/2023    Renal mass, left 10/01/2023    Arthritis 10/01/2023    Atypical depressive disorder 10/01/2023    Bilateral dry eyes 10/01/2023    BPPV (benign paroxysmal positional vertigo) 10/01/2023    Combined form of age-related cataract, both eyes 10/01/2023    Cyst of testis 10/01/2023    Dysphagia 10/01/2023    Edema 10/01/2023    Fatigue 10/01/2023    Hydrocele, bilateral 10/01/2023    Hyperlipidemia 10/01/2023    Hyperopia with presbyopia 10/01/2023    MGD (meibomian gland disease) 10/01/2023    Ocular migraine 10/01/2023    Orchitis and epididymitis 10/01/2023    Paroxysmal atrial fibrillation (Multi) 10/01/2023    Retinal scar of right eye 10/01/2023    Shortness of breath 10/01/2023    Sick sinus syndrome (Multi) 10/01/2023    Superficial basal cell carcinoma of skin of left shoulder 10/01/2023    Vestibulopathy 10/01/2023    Vitamin B 12 deficiency 10/01/2023    Vitamin D deficiency 10/01/2023    Hypoxia 10/01/2023    Anticoagulant long-term use 10/01/2023    Bilateral sensorineural hearing loss 10/01/2023    Chronic nasal congestion 10/01/2023    Class 1 obesity with body mass index (BMI) of 31.0 to 31.9 in adult 10/01/2023    Degenerative cervical spinal stenosis 10/01/2023    Diabetes mellitus treated with oral medication (Multi) 10/01/2023    High risk medication use 10/01/2023    History of snoring 10/01/2023    Hypertrophy of inferior nasal turbinate 10/01/2023    Paroxysmal atrial flutter (Multi) 10/01/2023    Tinnitus 10/01/2023    Agent orange exposure 08/21/2023    Atypical atrial flutter (Multi) 08/21/2023    BPH with obstruction/lower urinary tract symptoms 08/21/2023    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    Erectile  "dysfunction 08/21/2023    Major depressive disorder with single episode, in full remission (CMS-HCC) 08/21/2023    Acute bronchitis due to other specified organisms 08/21/2023    Hemangioma of skin and subcutaneous tissue 06/20/2023    Actinic keratosis 06/20/2023    Other seborrheic keratosis 06/20/2023    Melanocytic nevi of scalp and neck 06/20/2023    Melanocytic nevi of trunk 06/20/2023    Melanocytic nevi of unspecified lower limb, including hip 06/20/2023    Melanocytic nevi of unspecified part of face 06/20/2023    Melanocytic nevi of unspecified upper limb, including shoulder 06/20/2023    Neoplasm of uncertain behavior of skin 06/20/2023    Other hypertrophic disorders of the skin 06/20/2023    Other melanin hyperpigmentation 06/20/2023    Personal history of other malignant neoplasm of skin 06/20/2023    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    Viral wart, unspecified 06/20/2023    Lumbar stenosis with neurogenic claudication 03/17/2020    Myalgia, unspecified site 09/03/2019    Radial styloid tenosynovitis 06/13/2019    RAMSES (obstructive sleep apnea) 02/27/2018    Primary hypertension 02/27/2018    Mitral valve disease 02/27/2018    Presence of cardiac pacemaker 01/24/2018    History of mitral valve replacement 01/24/2018    Cervical spondylosis without myelopathy 02/02/2016    Chondromalacia of patella 01/19/2016    Primary localized osteoarthrosis of shoulder region 01/19/2016    Adhesive capsulitis of shoulder 01/19/2016       Objective:     Vitals:    10/01/24 0808   BP: 132/64   BP Location: Left arm   Patient Position: Sitting   Pulse: 71   Weight: 96.9 kg (213 lb 9.6 oz)   Height: 1.778 m (5' 10\")      Wt Readings from Last 4 Encounters:   10/01/24 96.9 kg (213 lb 9.6 oz)   09/27/24 97.2 kg (214 lb 4.6 oz)   09/12/24 96.9 kg (213 lb 9.6 oz)   07/25/24 98 kg (216 lb)           LAB:     Lab Results   Component Value Date    WBC 5.5 04/10/2024    HGB 13.6 04/10/2024    HCT 41.2 04/10/2024    PLT " 142 (L) 04/10/2024    CHOL 157 06/27/2024    TRIG 144 06/27/2024    HDL 42.7 06/27/2024    ALT 16 06/27/2024    AST 19 06/27/2024     09/27/2024    K 3.6 09/27/2024     09/27/2024    CREATININE 1.30 09/27/2024    BUN 27 (H) 09/27/2024    CO2 28 09/27/2024    TSH 1.34 04/04/2022    PSA 8.17 (H) 08/29/2024    INR 3.5 (H) 09/27/2024    HGBA1C 6.9 (H) 06/27/2024           Physical Exam     General Appearance: alert and oriented to person, place and time, in no acute distress  Cardiovascular: normal rate, regular rhythm, normal S1 and S2, no murmurs, rubs, clicks, or gallops,  no JVD he has occasional extrasystole that correlate to PACs seen on his rhythm strip and this is what he notices to be pounding in his chest.  Pulmonary/Chest: clear to auscultation bilaterally- no wheezes, rales or rhonchi, normal air movement, no respiratory distress  Abdomen: soft, non-tender, non-distended, normal bowel sounds, no masses   Extremities: no cyanosis, clubbing or edema  Skin: warm and dry, no rash or erythema  Eyes: EOMI  Neck: supple and non-tender without mass, no thyromegaly   Neurological: alert, oriented, normal speech, no focal findings or movement disorder noted  Vascular: Pulses 2+

## 2024-10-01 NOTE — PATIENT INSTRUCTIONS
STOP AMLODIPINE  START CARDIZEM 180MG TAKE ONE TAB DAILY    Please bring any lab results from other providers / physicians to your next appointment.     Please bring all medicines, vitamins, and herbal supplements with you when you come to the office.     Prescriptions will not be filled unless you are compliant with your follow up appointments or have a follow up appointment scheduled as per instruction of your physician. Refills should be requested at the time of your visit.      DID YOU KNOW:  We have a pharmacy here in the [facility].  They can fill all prescriptions, not just cardiac medications.  Prescriptions from other pharmacies can easily be transferred to the  pharmacy by the  pharmacist on site.   pharmacies offer FREE HOME DELIVERY on medications to anywhere in [place]. They can sync your medications. Typically prescriptions can be ready in 10 - 15 minutes. If pharmacy is unable to fill your  prescription or if cost is more than your paying now the Pharmacist can easily transfer back to your Pharmacy of choice. Pharmacy phone # [phone].   Continue same medications and treatments.     Please bring any lab results from other providers / physicians to your next appointment.     Please bring all medicines, vitamins, and herbal supplements with you when you come to the office.     Prescriptions will not be filled unless you are compliant with your follow up appointments or have a follow up appointment scheduled as per instruction of your physician. Refills should be requested at the time of your visit.      DID YOU KNOW:  We have a pharmacy here in the [facility].  They can fill all prescriptions, not just cardiac medications.  Prescriptions from other pharmacies can easily be transferred to the  pharmacy by the  pharmacist on site.   pharmacies offer FREE HOME DELIVERY on medications to anywhere in [place]. They can sync your medications. Typically prescriptions  can be ready in 10 - 15 minutes. If pharmacy is unable to fill your  prescription or if cost is more than your paying now the Pharmacist can easily transfer back to your Pharmacy of choice. Pharmacy phone # [phone].       Scribe Attestation  By signing my name below, IAbhilash LPN , Lucinda   attest that this documentation has been prepared under the direction and in the presence of Ck Celis MD.

## 2024-10-07 ENCOUNTER — ANCILLARY PROCEDURE (OUTPATIENT)
Dept: CARDIOLOGY | Facility: HOSPITAL | Age: 79
End: 2024-10-07
Payer: MEDICARE

## 2024-10-07 DIAGNOSIS — R06.02 SHORTNESS OF BREATH: Primary | ICD-10-CM

## 2024-10-07 DIAGNOSIS — R06.02 SHORTNESS OF BREATH: ICD-10-CM

## 2024-10-07 PROCEDURE — 93018 CV STRESS TEST I&R ONLY: CPT | Performed by: INTERNAL MEDICINE

## 2024-10-07 PROCEDURE — 78452 HT MUSCLE IMAGE SPECT MULT: CPT

## 2024-10-07 PROCEDURE — 93016 CV STRESS TEST SUPVJ ONLY: CPT | Performed by: INTERNAL MEDICINE

## 2024-10-07 PROCEDURE — 78452 HT MUSCLE IMAGE SPECT MULT: CPT | Performed by: INTERNAL MEDICINE

## 2024-10-07 RX ORDER — REGADENOSON 0.08 MG/ML
0.4 INJECTION, SOLUTION INTRAVENOUS ONCE
Status: COMPLETED | OUTPATIENT
Start: 2024-10-07 | End: 2024-10-07

## 2024-10-10 ENCOUNTER — LAB (OUTPATIENT)
Dept: LAB | Facility: LAB | Age: 79
End: 2024-10-10
Payer: MEDICARE

## 2024-10-10 ENCOUNTER — APPOINTMENT (OUTPATIENT)
Dept: PRIMARY CARE | Facility: CLINIC | Age: 79
End: 2024-10-10
Payer: MEDICARE

## 2024-10-10 DIAGNOSIS — E08.65 DIABETES MELLITUS DUE TO UNDERLYING CONDITION WITH HYPERGLYCEMIA, UNSPECIFIED WHETHER LONG TERM INSULIN USE: ICD-10-CM

## 2024-10-10 DIAGNOSIS — E66.811 CLASS 1 OBESITY WITH BODY MASS INDEX (BMI) OF 31.0 TO 31.9 IN ADULT, UNSPECIFIED OBESITY TYPE, UNSPECIFIED WHETHER SERIOUS COMORBIDITY PRESENT: ICD-10-CM

## 2024-10-10 DIAGNOSIS — Z79.01 ANTICOAGULANT LONG-TERM USE: ICD-10-CM

## 2024-10-10 LAB
ALBUMIN SERPL BCP-MCNC: 4.4 G/DL (ref 3.4–5)
ALP SERPL-CCNC: 76 U/L (ref 33–136)
ALT SERPL W P-5'-P-CCNC: 21 U/L (ref 10–52)
ANION GAP SERPL CALC-SCNC: 13 MMOL/L (ref 10–20)
AST SERPL W P-5'-P-CCNC: 21 U/L (ref 9–39)
BILIRUB SERPL-MCNC: 0.8 MG/DL (ref 0–1.2)
BUN SERPL-MCNC: 27 MG/DL (ref 6–23)
CALCIUM SERPL-MCNC: 9.2 MG/DL (ref 8.6–10.3)
CHLORIDE SERPL-SCNC: 104 MMOL/L (ref 98–107)
CO2 SERPL-SCNC: 29 MMOL/L (ref 21–32)
CREAT SERPL-MCNC: 1.33 MG/DL (ref 0.5–1.3)
EGFRCR SERPLBLD CKD-EPI 2021: 54 ML/MIN/1.73M*2
GLUCOSE SERPL-MCNC: 114 MG/DL (ref 74–99)
INR PPP: 3.2 (ref 0.9–1.1)
POTASSIUM SERPL-SCNC: 4.2 MMOL/L (ref 3.5–5.3)
PROT SERPL-MCNC: 6.8 G/DL (ref 6.4–8.2)
PROTHROMBIN TIME: 36.4 SECONDS (ref 9.8–12.8)
SODIUM SERPL-SCNC: 142 MMOL/L (ref 136–145)
VIT B12 SERPL-MCNC: 305 PG/ML (ref 211–911)

## 2024-10-10 PROCEDURE — 80053 COMPREHEN METABOLIC PANEL: CPT

## 2024-10-10 PROCEDURE — 82607 VITAMIN B-12: CPT

## 2024-10-10 PROCEDURE — 83036 HEMOGLOBIN GLYCOSYLATED A1C: CPT

## 2024-10-10 PROCEDURE — 85610 PROTHROMBIN TIME: CPT

## 2024-10-10 PROCEDURE — 36415 COLL VENOUS BLD VENIPUNCTURE: CPT

## 2024-10-11 ENCOUNTER — ANTICOAGULATION - WARFARIN VISIT (OUTPATIENT)
Dept: CARDIOLOGY | Facility: CLINIC | Age: 79
End: 2024-10-11
Payer: MEDICARE

## 2024-10-11 LAB
EST. AVERAGE GLUCOSE BLD GHB EST-MCNC: 154 MG/DL
HBA1C MFR BLD: 7 %

## 2024-10-11 NOTE — PROGRESS NOTES
Called ans spoke with Mando Tay regarding INR 3.25 with goal 2.5-3.5.  will continue current Coumadin dosing and repeat INR in 4 weeks approximately 11/6/2024.    Mr. Tay verbalizes understanding.

## 2024-10-17 ENCOUNTER — APPOINTMENT (OUTPATIENT)
Dept: CARDIOLOGY | Facility: CLINIC | Age: 79
End: 2024-10-17
Payer: MEDICARE

## 2024-10-17 ENCOUNTER — OFFICE VISIT (OUTPATIENT)
Dept: CARDIOLOGY | Facility: CLINIC | Age: 79
End: 2024-10-17
Payer: COMMERCIAL

## 2024-10-17 VITALS
HEART RATE: 72 BPM | SYSTOLIC BLOOD PRESSURE: 126 MMHG | HEIGHT: 70 IN | BODY MASS INDEX: 30.64 KG/M2 | DIASTOLIC BLOOD PRESSURE: 68 MMHG | WEIGHT: 214 LBS

## 2024-10-17 DIAGNOSIS — Z01.818 PRE-OPERATIVE CLEARANCE: ICD-10-CM

## 2024-10-17 DIAGNOSIS — R06.02 SHORTNESS OF BREATH: ICD-10-CM

## 2024-10-17 DIAGNOSIS — I05.9 MITRAL VALVE DISEASE: ICD-10-CM

## 2024-10-17 DIAGNOSIS — I48.4 ATYPICAL ATRIAL FLUTTER (MULTI): Primary | ICD-10-CM

## 2024-10-17 DIAGNOSIS — I25.10 CORONARY ARTERY DISEASE INVOLVING NATIVE CORONARY ARTERY OF NATIVE HEART WITHOUT ANGINA PECTORIS: ICD-10-CM

## 2024-10-17 DIAGNOSIS — Z95.0 PRESENCE OF CARDIAC PACEMAKER: ICD-10-CM

## 2024-10-17 RX ORDER — ENOXAPARIN SODIUM 100 MG/ML
100 INJECTION SUBCUTANEOUS EVERY 12 HOURS
Qty: 10 EACH | Refills: 0 | Status: SHIPPED | OUTPATIENT
Start: 2024-10-17

## 2024-10-17 NOTE — PROGRESS NOTES
Patient:  Mando Tay  YOB: 1945  MRN: [id number]       Impression/Plan:     Diagnoses and all orders for this visit:  Atypical atrial flutter (Multi)         -    Clinically remains in sinus rhythm with appropriate pacing.  At time of stress test remained in dual-chamber pacing mode.  Overall feels better I suspect because Cardizem has suppress the PACs.  Continue current medical regimen    Coronary artery disease involving native coronary artery of native heart without angina pectoris        -     He does have signs of coronary calcium but perfusion study negative putting him at low risk of significant stenosis.  Continue statin therapy no evidence to suggest significant ischemic disease at this point    Mitral valve disease  -     Normally functioning mechanical mitral valve will need bridging as described below prior to surgery  -     enoxaparin (Lovenox) 100 mg/mL syringe; Inject 1 mL (100 mg) under the skin every 12 hours.  Presence of cardiac pacemaker  -    Normally functioning device  Shortness of breath  -     I think primarily pulmonary at this point he has been in sinus rhythm no evidence of significant volume overload.  He does have diastolic dysfunction but I think is well compensated with the addition of Jardiance.  He has pulmonary follow-up in the near future  Pre-operative clearance  -     Low risk of hemodynamic or cardiac event issues Main issue is that he needs to have Chanda to avoid thromboembolic phenomenon from his mechanical mitral valve.  -     Discontinue warfarin October 24.  Begin 6 AM 6 PM Lovenox a milligram per kilogram every 12 hours on the 27th last dose the 28th at 6 PM.  To have surgery on the 29th which time he will take no Lovenox but will start warfarin back at prior dose.  On the 30th if felt to be hemodynamically and hemostasis stable would resume Lovenox it twice daily until INR is in excess of 2      Chief Complaint/Active Symptoms:       Mando Tay is a 79  y.o. male who presents with  paroxysmal atrial flutter, previous mechanical mitral valve replacement for mitral valve prolapse and endocarditis in 1998, sick sinus syndrome with permanent pacemaker.  Coronary angiography at that time with trivial irregularities circumflex only.  He has longstanding hypertension and diabetes.  From medical standpoint also with history of restrictive lung disease, T-cell lymphoma diagnosed in 2015.  Does have moderately severe restrictive lung disease     FALL 2022 he had symptoms of shortness of breath and CHF felt related to atrial flutter. He was put on flecainide cardioverted initially felt better but then further deteriorated had episodes of pneumonia some unexplained shortness of breath with eventually was felt related to flecainide.. He felt better off the flecainide which was discontinued in April. There is a 5-10% incidence of sensation of dyspnea associated with flecainide. Then became ill again with orchiditis. Subsequent CT scan demonstrated a right renal cyst and a left renal mass. He has since seen urology who feels it very likely could be malignancy but because of its size metastatic progression relatively unlikely and and serial imaging is planned.      Seen in pulmonary medicine office 10/17/2023 at which time the moderately severe restrictive lung disease described.  Recommended he follow-up with cardiology for control of A-fib and hypertension though there was no evidence of A-fib at that office visit but blood pressure was about 150 systolic at that time.       4/23/2024 underwent left renal mass biopsy and cryoablation no cardiac complications described     Pathology demonstrated oncocytic renal neoplasm.      9/27/2024 direct-current cardioversion after MARTINA demonstrated normal valve and LV function and no thrombus.  Tiny PFO identified.  Soon thereafter he followed up in this office and still felt as though he might be in A-fib.  EKG showed sinus but frequent  PACs.  ECG that day 10/1/2024 showed dual-chamber pacing with frequent PACs    10/1/2024 noted to be in sinus rhythm but was feeling poorly as described.  He did not think at that time he felt much different than when he had been in atrial fibrillation.  But he had had very frequent PACs.  Therefore his amlodipine was switched to Cardizem in hopes of suppressing the symptomatic PACs.  He does have restrictive lung disease and hence amiodarone and Multaq were not utilized.  It was felt other agents would significantly increase his QTc which was already somewhat prolonged.  In light of his mechanical valve not felt to be an optimal candidate for ablation and actually said he really did not feel bad when he was in fib and flutter as long as the rate was controlled.  Blood pressure was well-controlled at that time.  Shortness of breath felt a combination of factors including pulmonary disease and diastolic dysfunction.  Because he had coronary calcium he was submitted for Lexiscan perfusion study.  His mechanical mitral valve was found to be working quite well on MARTINA.    10/8/2024 normal Lexiscan perfusion EF 64% he had electronic AV sequential pacing at time of that study    10/10/24 INR 3.2 CMP with glucose 114 sodium 142 potassium 4.2 BUN of 27 creatinine 1.33 similar to prior studies in the last 2 months    He is anticipating prostate biopsy in a couple of weeks on October 29.  He still feels short of breath not any worse than previously overall he feels better than when last year he notes no further skipping of his heartbeat and less he is sitting doing absolutely nothing and sometimes he will notice what he describes as an occasional flutter.  Overall better on the current medical regimen from a cardiac standpoint.  Breathing has been problematic he does not seem to have significant CHF at this point he does have restrictive lung disease and has follow-up with pulmonary in the not-too-distant future.  Has had no  chest tightness pressure and no bleeding issues                     Review of Systems: Unremarkable except as noted above    Meds     Current Outpatient Medications   Medication Instructions    betamethasone valerate (Valisone) 0.1 % cream 2 times daily    calcium carbonate-vitamin D3 (Oyster Shell) 250 mg-3.125 mcg (125 unit) tablet 1 tablet, 2 times daily (morning and late afternoon)    carvedilol (COREG) 25 mg, oral, 2 times daily    cyanocobalamin (VITAMIN B-12) 2,000 mcg, oral, Daily    dilTIAZem CD (CARDIZEM CD) 180 mg, oral, Daily    empagliflozin (Jardiance) 25 mg 0.5 tablets, Daily    ezetimibe (ZETIA) 10 mg, oral, Nightly    furosemide (LASIX) 20 mg, oral, Daily    isosorbide mononitrate ER (IMDUR) 30 mg, oral, Daily    metFORMIN (GLUCOPHAGE) 500 mg, oral, 2 times daily (morning and late afternoon)    pravastatin (PRAVACHOL) 20 mg, oral, Daily    sertraline (ZOLOFT) 100 mg, oral, Daily    tamsulosin (FLOMAX) 0.8 mg, oral, Daily    valsartan (DIOVAN) 160 mg, oral, 2 times daily    warfarin (Coumadin) 5 mg tablet As directed to maintain therapeutic INR.  Number of tablets equals 90 day supply.  (Taking 7.5 mg Tues and Thurs, 5 mg all other days of the week)        Allergies     Allergies   Allergen Reactions    Atorvastatin Myalgia    Flecainide Unknown    Rosuvastatin Myalgia    Statins-Hmg-Coa Reductase Inhibitors Unknown     Statins do not work    Sulfa (Sulfonamide Antibiotics) Unknown    Procainamide Rash         Annotated Problems     Specialty Problems          Cardiology Problems    Mitral valve disease     · 12/7/2021 echo LVEF 60%. LVH. RVSP 44 mm. 2+ AI unchanged. Normal function      mechanical mitral valve       History of mitral valve prolapse with mitral valve replacement mechanical [facility]        valve 1998 secondary to severe MR and endocarditis   History of mitral valve prolapse with mitral valve replacement mechanical [facility]      valve 1998 secondary to severe MR and endocarditis       Late 1997 MI- Felt due to emboli from mitral valve vegetation.         Primary hypertension    Pigmented purpuric dermatosis    Atypical atrial flutter (Multi)    Hyperlipidemia      · 1997 trivial coronary irreg in cx. normal LV, severe MR w/subsequent MVR.          Ocular migraine    Paroxysmal atrial fibrillation (Multi)    Paroxysmal atrial flutter (Multi)    Sick sinus syndrome (Multi)    Chronic diastolic congestive heart failure, NYHA class 2    Ascending aortic aneurysm (CMS-HCC)     CT scan 9/27/2024: Ascending aorta 42 mm         Coronary artery disease involving native coronary artery of native heart without angina pectoris        Problem List     Patient Active Problem List    Diagnosis Date Noted    Coronary artery disease involving native coronary artery of native heart without angina pectoris 10/01/2024    Ascending aortic aneurysm (CMS-HCC) 10/01/2024    Medicare annual wellness visit, subsequent 06/06/2024    Never smoked cigarettes 06/06/2024    Pre-operative clearance 04/04/2024    Chronic diastolic congestive heart failure, NYHA class 2 10/30/2023    Medication dose changed 10/30/2023    Adrenal nodule 10/01/2023    Renal mass, left 10/01/2023    Arthritis 10/01/2023    Atypical depressive disorder 10/01/2023    Bilateral dry eyes 10/01/2023    BPPV (benign paroxysmal positional vertigo) 10/01/2023    Combined form of age-related cataract, both eyes 10/01/2023    Cyst of testis 10/01/2023    Dysphagia 10/01/2023    Edema 10/01/2023    Fatigue 10/01/2023    Hydrocele, bilateral 10/01/2023    Hyperlipidemia 10/01/2023    Hyperopia with presbyopia 10/01/2023    MGD (meibomian gland disease) 10/01/2023    Ocular migraine 10/01/2023    Orchitis and epididymitis 10/01/2023    Paroxysmal atrial fibrillation (Multi) 10/01/2023    Retinal scar of right eye 10/01/2023    Shortness of breath 10/01/2023    Sick sinus syndrome (Multi) 10/01/2023    Superficial basal cell carcinoma of skin of left shoulder  10/01/2023    Vestibulopathy 10/01/2023    Vitamin B 12 deficiency 10/01/2023    Vitamin D deficiency 10/01/2023    Hypoxia 10/01/2023    Anticoagulant long-term use 10/01/2023    Bilateral sensorineural hearing loss 10/01/2023    Chronic nasal congestion 10/01/2023    Class 1 obesity with body mass index (BMI) of 31.0 to 31.9 in adult 10/01/2023    Degenerative cervical spinal stenosis 10/01/2023    Diabetes mellitus treated with oral medication (Multi) 10/01/2023    High risk medication use 10/01/2023    History of snoring 10/01/2023    Hypertrophy of inferior nasal turbinate 10/01/2023    Paroxysmal atrial flutter (Multi) 10/01/2023    Tinnitus 10/01/2023    Agent orange exposure 08/21/2023    Atypical atrial flutter (Multi) 08/21/2023    BPH with obstruction/lower urinary tract symptoms 08/21/2023    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    Erectile dysfunction 08/21/2023    Major depressive disorder with single episode, in full remission (CMS-[facility]) 08/21/2023    Acute bronchitis due to other specified organisms 08/21/2023    Hemangioma of skin and subcutaneous tissue 06/20/2023    Actinic keratosis 06/20/2023    Other seborrheic keratosis 06/20/2023    Melanocytic nevi of scalp and neck 06/20/2023    Melanocytic nevi of trunk 06/20/2023    Melanocytic nevi of unspecified lower limb, including hip 06/20/2023    Melanocytic nevi of unspecified part of face 06/20/2023    Melanocytic nevi of unspecified upper limb, including shoulder 06/20/2023    Neoplasm of uncertain behavior of skin 06/20/2023    Other hypertrophic disorders of the skin 06/20/2023    Other melanin hyperpigmentation 06/20/2023    Personal history of other malignant neoplasm of skin 06/20/2023    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    Viral wart, unspecified 06/20/2023    Lumbar stenosis with neurogenic claudication 03/17/2020    Myalgia, unspecified site 09/03/2019    Radial styloid tenosynovitis 06/13/2019    RAMSES (obstructive sleep  "apnea) 02/27/2018    Primary hypertension 02/27/2018    Mitral valve disease 02/27/2018    Presence of cardiac pacemaker 01/24/2018    History of mitral valve replacement 01/24/2018    Cervical spondylosis without myelopathy 02/02/2016    Chondromalacia of patella 01/19/2016    Primary localized osteoarthrosis of shoulder region 01/19/2016    Adhesive capsulitis of shoulder 01/19/2016       Objective:     Vitals:    10/17/24 1247   BP: 126/68   BP Location: Left arm   Patient Position: Sitting   Pulse: 72   Weight: 97.1 kg (214 lb)   Height: 1.778 m (5' 10\")      Wt Readings from Last 4 Encounters:   10/17/24 97.1 kg (214 lb)   10/01/24 96.9 kg (213 lb 9.6 oz)   09/27/24 97.2 kg (214 lb 4.6 oz)   09/12/24 96.9 kg (213 lb 9.6 oz)           LAB:     Lab Results   Component Value Date    WBC 5.5 04/10/2024    HGB 13.6 04/10/2024    HCT 41.2 04/10/2024     (L) 04/10/2024    CHOL 157 06/27/2024    TRIG 144 06/27/2024    HDL 42.7 06/27/2024    ALT 21 10/10/2024    AST 21 10/10/2024     10/10/2024    K 4.2 10/10/2024     10/10/2024    CREATININE 1.33 (H) 10/10/2024    BUN 27 (H) 10/10/2024    CO2 29 10/10/2024    TSH 1.34 04/04/2022    PSA 8.17 (H) 08/29/2024    INR 3.2 (H) 10/10/2024    HGBA1C 7.0 (H) 10/10/2024       Diagnostic Studies:     ECG 12 lead    Result Date: 9/29/2024  AV dual-paced rhythm with prolonged AV conduction Abnormal ECG When compared with ECG of 27-SEP-2024 08:05, (unconfirmed) Vent. rate has decreased BY  20 BPM Confirmed by Kelby Lawrence ([id number]) on 9/29/2024 8:26:51 PM    ECG 12 lead    Result Date: 9/29/2024  Ventricular-paced rhythm Abnormal ECG When compared with ECG of 27-SEP-2024 08:05, (unconfirmed) No significant change was found Confirmed by Kelby Lawrence ([id number]) on 9/29/2024 8:23:26 PM    Transesophageal Echo (MARTINA)    Result Date: 9/27/2024          [address] [address]  Tel [phone] Fax [phone] TRANSESOPHAGEAL " ECHOCARDIOGRAM REPORT Patient Name:      RASHEEDA GOODEJOLENE        Reading Physician:    [id number] Truong Myers MD Study Date:        9/27/2024            Ordering Provider:    [id number] OUMOU GURROLA                                                               JENNIFER MRN/PID:           [id number]             Fellow: Accession#:        [id number]         Nurse:                Mason Gotti RN Date of Birth/Age: 1945 / 79 years  Sonographer:          Juliann Mahajan RDCS Gender:            M                    Additional Staff: Height:            177.80 cm            Admit Date:           9/27/2024 Weight:            96.62 kg             Admission Status:     Outpatient BSA / BMI:         2.14 m2 / 30.56      Department Location:  [address] [place] [address]                    kg/m2 Blood Pressure: 151 /79 mmHg Study Type:    TRANSESOPHAGEAL ECHO (MARTINA) Diagnosis/ICD: Unspecified atrial fibrillation-I48.91 Indication:    AF CPT Codes:     MARTINA Complete-14375; Moderate Sedation Services initial 15 minutes                patient >5 years-[id number]  Study Detail: The following Echo studies were performed: 2D, Doppler and color               flow. Agitated saline used as a contrast agent for intraseptal               flow evaluation. The patient was sedated.  PHYSICIAN INTERPRETATION: MARTINA Details: The MARTINA probe used was [id number]. Technically adequate omniplane transesophageal echocardiogram performed. Agitated saline contrast echo was performed to assess for the presence of a patent foramen ovale. MARTINA Medication: The pharynx was anesthetized with Cetacaine spray, lidocaine ointment and viscous lidocaine. The patient was sedated using moderate sedation. Midazolam and Fentanyl was used to sedate the patient for this exam. MARTINA Procedure: The probe was passed without difficulty. The following complication was encountered during the  procedure: Patient tolerated the procedure well without any apparent complications. Left Ventricle: The left ventricular systolic function is normal, with a visually estimated ejection fraction of 55%. There are no regional wall motion abnormalities. The left ventricular cavity size is normal. There is mild to moderate concentric left ventricular hypertrophy. Spectral Doppler shows an impaired relaxation pattern of left ventricular diastolic filling. Left Atrium: The left atrium is moderate to severely dilated. There is no definite left atrial thrombus present. A bubble study using agitated saline was performed. Bubble study is positive. A small PFO (= 10 bubbles) was demonstrated. There is a normal sized left atrial appendage. There is no definite left atrial mass present. Right Ventricle: The right ventricle is normal in size. There is normal right ventricular global systolic function. A device is visualized in the right ventricle. Right Atrium: The right atrium is normal in size. There is a device visualized in the right atrium. Aortic Valve: The aortic valve appears structurally normal. There is mild aortic valve regurgitation. Mitral Valve: The mitral valve is abnormal. There is mild mitral valve regurgitation. Bioprosthetic mitral valve mean gradient of 13 mmHg, very small perivalvular leak, clinically insignificant. Tricuspid Valve: The tricuspid valve is abnormal. There is mild tricuspid regurgitation. Pulmonic Valve: The pulmonic valve was not assessed. The pulmonic valve regurgitation was not assessed. Pericardium: No pericardial effusion noted. Aorta: The aortic root is normal.  CONCLUSIONS:  1. The left ventricular systolic function is normal, with a visually estimated ejection fraction of 55%.  2. Spectral Doppler shows an impaired relaxation pattern of left ventricular diastolic filling.  3. There is normal right ventricular global systolic function.  4. The left atrium is moderate to severely dilated.   5. Bioprosthetic mitral valve mean gradient of 13 mmHg, very small perivalvular leak, clinically insignificant.  6. Mild aortic valve regurgitation.  7. No left atrial mass.  8. No left atrial thrombus.  9. A bubble study using agitated saline was performed. Bubble study is positive. A small PFO (= 10 bubbles) was demonstrated. QUANTITATIVE DATA     Cardioversion external    Result Date: 9/27/2024  Table formatting from the original result was not included. Procedure Details: Procedure Details:  Cardioversion Summary: ·   External electric cardioversion of atrial fibrillation to normal sinus rhythm was achieved using 200 J synchronized biphasic Appropriate sensing and impedances of both leads of the pacemaker. . Recommendations: 1. A 12 lead ECG should be performed prior to discharge from the hospital. 2. The patient should continue with the present medications. Discharge: 1. The patient recovered uneventfully from the effects of conscious sedation. The patient left the EP laboratory hemodynamically stable and without neurological deficits. Follow up: 1. The patient will be discharged on the day of the procedure, following bed rest and subsequent ambulation, provided the recovery parameters are appropriate. The patient should call the electrophysiologist immediately if symptoms recur, or for any problems. The patient has been instructed accordingly. ________________________________________ Procedures: Cardioversion.  Pre and post dual-chamber pacemaker interrogation reprogramming ________________________________________ Patient history: Please refer to the detailed history and physical on the patient's medical chart.  History of recurrent atrial fibrillation.  Status post pacemaker.  Patient is scheduled for electrical cardioversion today ________________________________________ Procedure narrative: The device was interrogated and read primary prior to procedure.  The risks, benefits, and alternatives to the  procedure and sedation were explained to the patient, and informed consent was obtained. The patient was in the fasting state. A grounding pad was placed. Self-adhesive anterior-posterior defibrillation pads were applied. A ZOLL defibrillator was used for monitoring and the defibrillator waveform was set to biphasic. The patient was set up for continuous monitoring of surface 12 lead ECG and pulse oximetry. Blood pressure was monitored with automatic cuff measurements. The procedure was performed under IV conscious sedation performed by anesthesiology service. 1. External electric cardioversion of atrial fibrillation to normal sinus rhythm was achieved using 200 J synchronized biphasic. Appropriate sensing and impedances of both leads of the pacemaker.. ________________________________________ Complications: The patient tolerated the procedure without any complications or incident. ________________________________________ Prepared and signed by Tolerance: good Complications: None          Radiology:     No orders to display       Physical Exam     General Appearance: alert and oriented to person, place and time, in no acute distress  Cardiovascular: normal rate, regular rhythm, normal S1 and S2, 1/6 systolic flow murmur no mitral stenosis murmur, no rubs, clicks, or gallops,  no JVD. Crisp mechanical mitral valve sounds  Pulmonary/Chest: clear to auscultation bilaterally- no wheezes, rales or rhonchi, normal air movement, no respiratory distress  Abdomen: soft, non-tender, non-distended, normal bowel sounds, no masses   Extremities: no cyanosis, clubbing or edema  Skin: warm and dry, no rash or erythema  Eyes: EOMI  Neck: supple and non-tender without mass, no thyromegaly   Neurological: alert, oriented, normal speech, no focal findings or movement disorder noted  Vascular: 2+ pulses

## 2024-10-17 NOTE — PATIENT INSTRUCTIONS
24TH LAST DOSE OF WARFARIN  25TH & 26TH NO WARFARIN  27 & 28TH 6 A.M. & 6 P.M. TAKE LOVENOX   29TH START WARFARIN   30TH LOVENOX 7 A.M & 7 P.M   INR WEEKLY UNTIL GREATER THAN 2

## 2024-10-21 DIAGNOSIS — R09.81 NASAL CONGESTION: ICD-10-CM

## 2024-10-21 RX ORDER — FLUTICASONE PROPIONATE 50 MCG
1 SPRAY, SUSPENSION (ML) NASAL DAILY
Qty: 54 G | Refills: 3 | Status: SHIPPED | OUTPATIENT
Start: 2024-10-21 | End: 2025-10-21

## 2024-10-25 ENCOUNTER — TELEPHONE (OUTPATIENT)
Dept: CARDIOLOGY | Facility: CLINIC | Age: 79
End: 2024-10-25
Payer: MEDICARE

## 2024-10-25 DIAGNOSIS — F32.5 MAJOR DEPRESSIVE DISORDER WITH SINGLE EPISODE, IN FULL REMISSION (CMS-HCC): ICD-10-CM

## 2024-10-26 RX ORDER — SERTRALINE HYDROCHLORIDE 100 MG/1
100 TABLET, FILM COATED ORAL DAILY
Qty: 90 TABLET | Refills: 1 | Status: SHIPPED | OUTPATIENT
Start: 2024-10-26

## 2024-10-29 ENCOUNTER — APPOINTMENT (OUTPATIENT)
Dept: UROLOGY | Facility: CLINIC | Age: 79
End: 2024-10-29
Payer: MEDICARE

## 2024-10-29 VITALS
SYSTOLIC BLOOD PRESSURE: 168 MMHG | HEART RATE: 81 BPM | BODY MASS INDEX: 30.48 KG/M2 | TEMPERATURE: 97.8 F | DIASTOLIC BLOOD PRESSURE: 77 MMHG | WEIGHT: 212.4 LBS

## 2024-10-29 DIAGNOSIS — R97.20 ELEVATED PSA: ICD-10-CM

## 2024-10-29 LAB
POC APPEARANCE, URINE: CLEAR
POC BILIRUBIN, URINE: NEGATIVE
POC BLOOD, URINE: ABNORMAL
POC COLOR, URINE: YELLOW
POC GLUCOSE, URINE: ABNORMAL MG/DL
POC KETONES, URINE: NEGATIVE MG/DL
POC LEUKOCYTES, URINE: NEGATIVE
POC NITRITE,URINE: NEGATIVE
POC PH, URINE: 5.5 PH
POC PROTEIN, URINE: NEGATIVE MG/DL
POC SPECIFIC GRAVITY, URINE: 1.01
POC UROBILINOGEN, URINE: 0.2 EU/DL

## 2024-10-29 PROCEDURE — 81003 URINALYSIS AUTO W/O SCOPE: CPT | Performed by: UROLOGY

## 2024-10-29 PROCEDURE — 55700 PR PROSTATE NEEDLE BIOPSY ANY APPROACH: CPT | Performed by: UROLOGY

## 2024-10-29 PROCEDURE — G0416 PROSTATE BIOPSY, ANY MTHD: HCPCS | Performed by: PATHOLOGY

## 2024-10-29 PROCEDURE — 88344 IMHCHEM/IMCYTCHM EA MLT ANTB: CPT

## 2024-10-29 PROCEDURE — 88344 IMHCHEM/IMCYTCHM EA MLT ANTB: CPT | Performed by: PATHOLOGY

## 2024-10-29 PROCEDURE — 96372 THER/PROPH/DIAG INJ SC/IM: CPT | Performed by: UROLOGY

## 2024-10-29 PROCEDURE — G0416 PROSTATE BIOPSY, ANY MTHD: HCPCS

## 2024-10-29 PROCEDURE — 76942 ECHO GUIDE FOR BIOPSY: CPT | Performed by: UROLOGY

## 2024-10-29 RX ORDER — CHOLECALCIFEROL (VITAMIN D3) 25 MCG
1 TABLET ORAL
COMMUNITY
Start: 2024-07-15

## 2024-11-05 ENCOUNTER — LAB (OUTPATIENT)
Dept: LAB | Facility: LAB | Age: 79
End: 2024-11-05
Payer: MEDICARE

## 2024-11-05 ENCOUNTER — TELEPHONE (OUTPATIENT)
Dept: CARDIOLOGY | Facility: CLINIC | Age: 79
End: 2024-11-05

## 2024-11-05 DIAGNOSIS — Z79.01 ANTICOAGULANT LONG-TERM USE: ICD-10-CM

## 2024-11-05 LAB
INR PPP: 1.7 (ref 0.9–1.1)
PROTHROMBIN TIME: 19.6 SECONDS (ref 9.8–12.8)

## 2024-11-05 PROCEDURE — 85610 PROTHROMBIN TIME: CPT

## 2024-11-05 PROCEDURE — 36415 COLL VENOUS BLD VENIPUNCTURE: CPT

## 2024-11-05 NOTE — TELEPHONE ENCOUNTER
The patient called into the office and stated that he had an INR drawn. The message stated that the patient is currently taking an antibiotic so his lab results may be off.

## 2024-11-06 ENCOUNTER — ANTICOAGULATION - WARFARIN VISIT (OUTPATIENT)
Dept: CARDIOLOGY | Facility: CLINIC | Age: 79
End: 2024-11-06
Payer: MEDICARE

## 2024-11-06 NOTE — PROGRESS NOTES
Called and spoke with Mando Tay regarding INR 1.7 with goal of 2.5-3.5.      Change  Coumadin to 7.5 mg M-W-F  only along with  Coumadin 5 mg all other days.     Repeat INR 1 weeks, approximately Monday 11/11/2024    Mr. Tay verbalizes understanding.

## 2024-11-07 LAB
LAB AP ASR DISCLAIMER: NORMAL
LAB AP BLOCK FOR ADDITIONAL STUDIES: NORMAL
LABORATORY COMMENT REPORT: NORMAL
PATH REPORT.FINAL DX SPEC: NORMAL
PATH REPORT.GROSS SPEC: NORMAL
PATH REPORT.RELEVANT HX SPEC: NORMAL
PATH REPORT.TOTAL CANCER: NORMAL

## 2024-11-14 ENCOUNTER — LAB (OUTPATIENT)
Dept: LAB | Facility: LAB | Age: 79
End: 2024-11-14
Payer: MEDICARE

## 2024-11-14 DIAGNOSIS — Z79.01 ANTICOAGULANT LONG-TERM USE: ICD-10-CM

## 2024-11-14 LAB
INR PPP: 3.4 (ref 0.9–1.1)
PROTHROMBIN TIME: 38.8 SECONDS (ref 9.8–12.8)

## 2024-11-14 PROCEDURE — 36415 COLL VENOUS BLD VENIPUNCTURE: CPT

## 2024-11-14 PROCEDURE — 85610 PROTHROMBIN TIME: CPT

## 2024-11-15 ENCOUNTER — ANTICOAGULATION - WARFARIN VISIT (OUTPATIENT)
Dept: CARDIOLOGY | Facility: CLINIC | Age: 79
End: 2024-11-15
Payer: MEDICARE

## 2024-11-15 NOTE — PROGRESS NOTES
Called ans spoke with Mando Tay regarding INR 3.4 with goal 2.5- 3.5. Will Change  Coumadin to 7.5 mg M-F  only along with  Coumadin 5 mg all other days.     Repeat INR 3 weeks, approximately Monday 12/6/2024    Mando verbalizes understanding.

## 2024-11-18 ENCOUNTER — HOSPITAL ENCOUNTER (OUTPATIENT)
Dept: CARDIOLOGY | Age: 79
Discharge: HOME | End: 2024-11-18
Payer: MEDICARE

## 2024-11-18 PROCEDURE — 93280 PM DEVICE PROGR EVAL DUAL: CPT | Performed by: INTERNAL MEDICINE

## 2024-11-18 PROCEDURE — 93280 PM DEVICE PROGR EVAL DUAL: CPT

## 2024-11-19 ENCOUNTER — APPOINTMENT (OUTPATIENT)
Dept: UROLOGY | Facility: CLINIC | Age: 79
End: 2024-11-19
Payer: MEDICARE

## 2024-11-19 VITALS
HEART RATE: 84 BPM | TEMPERATURE: 97.7 F | DIASTOLIC BLOOD PRESSURE: 77 MMHG | WEIGHT: 211.4 LBS | BODY MASS INDEX: 30.33 KG/M2 | SYSTOLIC BLOOD PRESSURE: 165 MMHG

## 2024-11-19 DIAGNOSIS — C61 PROSTATE CANCER (MULTI): ICD-10-CM

## 2024-11-19 PROCEDURE — 99214 OFFICE O/P EST MOD 30 MIN: CPT | Performed by: UROLOGY

## 2024-11-19 PROCEDURE — 3078F DIAST BP <80 MM HG: CPT | Performed by: UROLOGY

## 2024-11-19 PROCEDURE — 3077F SYST BP >= 140 MM HG: CPT | Performed by: UROLOGY

## 2024-11-19 PROCEDURE — G2211 COMPLEX E/M VISIT ADD ON: HCPCS | Performed by: UROLOGY

## 2024-11-19 PROCEDURE — 1159F MED LIST DOCD IN RCRD: CPT | Performed by: UROLOGY

## 2024-11-19 ASSESSMENT — ENCOUNTER SYMPTOMS
LOSS OF SENSATION IN FEET: 0
OCCASIONAL FEELINGS OF UNSTEADINESS: 0
DEPRESSION: 0

## 2024-11-19 NOTE — PROGRESS NOTES
Subjective   Patient ID: Mando Tay is a 79 y.o. male who presents for Elevated PSA (S/p prostate biopsy). Patient is s/p IO prostate biopsy on 10/29/24.    HPI  The patient is accompanied by his wife, Soniya.  The patient is doing well after the biopsy. He relates that the hematuria was bad but recently resolved. He denies fever and chills.     PMH includes heart disease. 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     Pathology Results  FINAL DIAGNOSIS   A. PROSTATE, BIOPSY, LEFT BASE:   Prostatic adenocarcinoma, acinar type, Rik score 6 (3+3), grade group 1, involving 1 of 2 cores and approximately less than 5% of submitted tissue  High-grade prostatic intraepithelial neoplasia     Note: No intraductal carcinoma identified.  Immunostain cocktail PIN4 is consistent with the above diagnosis.      B. PROSTATE, BIOPSY, LEFT MID:   High-grade prostatic intraepithelial neoplasia with associated atypical glandular proliferation     C. PROSTATE, BIOPSY, LEFT APEX:   High-grade prostatic intraepithelial neoplasia     D. PROSTATE, BIOPSY, RIGHT BASE:   High-grade prostatic intraepithelial neoplasia     E. PROSTATE, BIOPSY, RIGHT MID:   Prostatic adenocarcinoma, acinar type, Rik score 6 (3+3), grade group 1, involving 1 of 2 cores and approximately less than 5% of submitted tissue  High-grade prostatic intraepithelial neoplasia     Note: No intraductal carcinoma identified.  Immunostain cocktail PIN4 is consistent with the above diagnosis.      F. PROSTATE, BIOPSY, RIGHT APEX:   Prostatic adenocarcinoma, acinar type, Brooks score 7 (4+3), grade group 3, involving 2 of 2 cores and approximately 25% of submitted tissue   - Percentage of Rik pattern 4: 70%     Note: No intraductal carcinoma or cribriform pattern identified.  Immunostain cocktail PIN4 is consistent with the above diagnosis.      Review of Systems  A 12 system review was completed and is negative with the  exception of those signs and symptoms noted in the history of present illness.    Objective   Physical Exam  General: in NAD, appears stated age  Head: normocephalic, atraumatic  Respiratory: normal effort, no use of accessory muscles  Cardiovascular: no edema noted  Skin: normal turgor, no rashes  Neurologic: grossly intact, oriented to person/place/time  Psychiatric: mode and affect appropriate     Assessment/Plan   Problem List Items Addressed This Visit    None  Visit Diagnoses         Codes    Prostate cancer (Multi)     C61    Relevant Orders    Follow Up In Urology          Biopsy findings of prostatic adenocarcinoma with samples of [place] 4+3=7, GG 3, upto 25% of tissue samples were involved.    His last PSA was 8.17. He had 4 positive samples; 2 were graded as a Rik 6 and the other 2 were [place] 7. We discussed sending his biopsy sample for genetic testing to help inform treatment options. My inclination is to recommend active surveillance with consideration of these factors and the patient's age, 79.     We had a long discussion together today regarding his new diagnosis of prostate cancer. I informed him that about 250,000 cases of prostate cancer are diagnosed annually, and despite this, only about 3% of those patients go on to die of prostate cancer. The reason for this is mostly twofold: 1) prostate cancer is not an aggressive malignancy in general; and 2) there are excellent treatment options.     We very briefly reviewed treatment options but will defer further treatment discussion until the genetic test results are back.     After our long discussion today, I recommend that we have the genetic testing done and follow-up in 6 weeks with results and a treatment decision. [facility] about his diagnosis of prostate cancer. Order Decipher genetic test.     Follow-up in 6 weeks for continued management of Decipher results. .     Scribe Attestation  By signing  my name below, I, Iesha Alberts , Scribclarita   attest that this documentation has been prepared under the direction and in the presence of Rafael Harp MD.

## 2024-11-20 ENCOUNTER — TRANSCRIBE ORDERS (OUTPATIENT)
Dept: CARDIOLOGY | Facility: CLINIC | Age: 79
End: 2024-11-20
Payer: MEDICARE

## 2024-11-20 DIAGNOSIS — Z95.0 PRESENCE OF CARDIAC PACEMAKER: ICD-10-CM

## 2024-11-21 ENCOUNTER — APPOINTMENT (OUTPATIENT)
Dept: PRIMARY CARE | Facility: CLINIC | Age: 79
End: 2024-11-21
Payer: MEDICARE

## 2024-11-21 VITALS
HEART RATE: 70 BPM | TEMPERATURE: 98.1 F | HEIGHT: 70 IN | BODY MASS INDEX: 30.41 KG/M2 | DIASTOLIC BLOOD PRESSURE: 90 MMHG | WEIGHT: 212.4 LBS | SYSTOLIC BLOOD PRESSURE: 177 MMHG

## 2024-11-21 DIAGNOSIS — E08.65 DIABETES MELLITUS DUE TO UNDERLYING CONDITION WITH HYPERGLYCEMIA, UNSPECIFIED WHETHER LONG TERM INSULIN USE: ICD-10-CM

## 2024-11-21 DIAGNOSIS — I10 PRIMARY HYPERTENSION: ICD-10-CM

## 2024-11-21 DIAGNOSIS — Z78.9 NEVER SMOKED CIGARETTES: ICD-10-CM

## 2024-11-21 DIAGNOSIS — E78.5 HYPERLIPIDEMIA, UNSPECIFIED HYPERLIPIDEMIA TYPE: ICD-10-CM

## 2024-11-21 DIAGNOSIS — E66.811 CLASS 1 OBESITY WITH BODY MASS INDEX (BMI) OF 30.0 TO 30.9 IN ADULT, UNSPECIFIED OBESITY TYPE, UNSPECIFIED WHETHER SERIOUS COMORBIDITY PRESENT: ICD-10-CM

## 2024-11-21 PROCEDURE — 1160F RVW MEDS BY RX/DR IN RCRD: CPT | Performed by: FAMILY MEDICINE

## 2024-11-21 PROCEDURE — 1159F MED LIST DOCD IN RCRD: CPT | Performed by: FAMILY MEDICINE

## 2024-11-21 PROCEDURE — 1158F ADVNC CARE PLAN TLK DOCD: CPT | Performed by: FAMILY MEDICINE

## 2024-11-21 PROCEDURE — 3077F SYST BP >= 140 MM HG: CPT | Performed by: FAMILY MEDICINE

## 2024-11-21 PROCEDURE — 1036F TOBACCO NON-USER: CPT | Performed by: FAMILY MEDICINE

## 2024-11-21 PROCEDURE — 1123F ACP DISCUSS/DSCN MKR DOCD: CPT | Performed by: FAMILY MEDICINE

## 2024-11-21 PROCEDURE — 3080F DIAST BP >= 90 MM HG: CPT | Performed by: FAMILY MEDICINE

## 2024-11-21 PROCEDURE — 99213 OFFICE O/P EST LOW 20 MIN: CPT | Performed by: FAMILY MEDICINE

## 2024-11-21 RX ORDER — METFORMIN HYDROCHLORIDE 500 MG/1
500 TABLET ORAL
Qty: 90 TABLET | Refills: 1 | Status: SHIPPED | OUTPATIENT
Start: 2024-11-21

## 2024-11-21 ASSESSMENT — ENCOUNTER SYMPTOMS
ALLERGIC/IMMUNOLOGIC NEGATIVE: 1
RESPIRATORY NEGATIVE: 1
GASTROINTESTINAL NEGATIVE: 1
HEMATOLOGIC/LYMPHATIC NEGATIVE: 1
CONSTITUTIONAL NEGATIVE: 1
CARDIOVASCULAR NEGATIVE: 1
PSYCHIATRIC NEGATIVE: 1
ENDOCRINE NEGATIVE: 1
EYES NEGATIVE: 1
MUSCULOSKELETAL NEGATIVE: 1

## 2024-11-21 ASSESSMENT — PATIENT HEALTH QUESTIONNAIRE - PHQ9
SUM OF ALL RESPONSES TO PHQ9 QUESTIONS 1 AND 2: 0
2. FEELING DOWN, DEPRESSED OR HOPELESS: NOT AT ALL
1. LITTLE INTEREST OR PLEASURE IN DOING THINGS: NOT AT ALL

## 2024-11-21 NOTE — PROGRESS NOTES
Juan Bhat is here for a follow-up on his diabetes.  He states that he is currently feeling well.  He has had several health-related events since last seen.  He was diagnosed with prostate cancer by urology 1 month ago.  He is undergoing genetic studies and further treatment is pending.  He also underwent a cardioversion and transesophageal echocardiogram as well as stress testing by his cardiologist.  Stress testing was normal.  Echocardiogram did reveal mild aortic valve regurgitation.  He also sees his pulmonologist for follow-up on restrictive lung disease.  He continues on his medications as noted.    Patient ID: Mando Tay is a 79 y.o. male who presents for Follow-up (4m follow up):    Problem List Items Addressed This Visit    None     Past Medical History:   Diagnosis Date    Acute myocardial infarction, unspecified 11/17/2021    Acute myocardial infarction    Arrhythmia     Asthma     Cutaneous T-cell lymphoma (Multi)     Depression     Diabetes mellitus (Multi)     Epididymitis 06/02/2022    Epididymitis, right    Essential (primary) hypertension 12/08/2022    Hypertension    Heart disease     Hyperlipidemia     Kidney tumor     Obesity, unspecified 04/04/2022    Class 1 obesity with body mass index (BMI) of 31.0 to 31.9 in adult    Obesity, unspecified 06/08/2022    Class 1 obesity with body mass index (BMI) of 30.0 to 30.9 in adult    Obesity, unspecified 01/10/2022    Class 1 obesity with body mass index (BMI) of 31.0 to 31.9 in adult    Obesity, unspecified 01/31/2022    Class 1 obesity with body mass index (BMI) of 31.0 to 31.9 in adult    Other abnormal findings in urine 05/09/2022    Dark urine    Other specified disorders of kidney and ureter     Bilateral renal masses    Other specified disorders of kidney and ureter 05/09/2022    Renal mass, right    Other specified disorders of the male genital organs 05/09/2022    Swelling of right half of scrotum    Other symptoms and signs involving  the musculoskeletal system 11/17/2021    Leg fatigue    Personal history of other diseases of the circulatory system 11/17/2021    History of mitral valve insufficiency    Personal history of other diseases of the respiratory system 12/09/2021    History of acute bronchitis    Personal history of other specified conditions 04/04/2022    History of shortness of breath    Personal history of other specified conditions     History of snoring    Personal history of pneumonia (recurrent) 01/31/2022    History of pneumonia    Prediabetes 06/18/2018    Borderline diabetes mellitus    Scrotal pain 05/09/2022    Acute pain in scrotum    Shortness of breath 12/09/2022    Shortness of breath on exertion    Sleep apnea     cpap    Unspecified cataract 02/22/2017    Cataract of right eye    Unspecified cataract 02/22/2017    Cataract of left eye    Unspecified osteoarthritis, unspecified site     Arthritis      Past Surgical History:   Procedure Laterality Date    CORONARY ARTERY BYPASS GRAFT  02/22/2017    Coronary Artery Surgery    HERNIA REPAIR  02/22/2017    Inguinal Hernia Repair    INSERT / REPLACE / REMOVE PACEMAKER      x3    OTHER SURGICAL HISTORY  11/17/2021    Surgery    OTHER SURGICAL HISTORY  11/17/2021    Cardioversion    OTHER SURGICAL HISTORY  12/20/2021    Mitral valve replacement    OTHER SURGICAL HISTORY  12/20/2021    Cardiac catheterization    PROSTATE BIOPSY  10/29/2024    RENAL MASS EXCISION Left 04/2024    tumor removed      Family History   Problem Relation Name Age of Onset    Other (Cardiac disorder) Mother      Cataracts Mother      Hypertension Mother      Other (Cardiac disorder) Father      Stomach cancer Father      Hypertension Father        Social History     Socioeconomic History    Marital status:      Spouse name: Not on file    Number of children: Not on file    Years of education: Not on file    Highest education level: Not on file   Occupational History    Not on file   Tobacco Use     Smoking status: Never    Smokeless tobacco: Never   Vaping Use    Vaping status: Never Used   Substance and Sexual Activity    Alcohol use: Not Currently     Comment: not for years    Drug use: Never    Sexual activity: Defer   Other Topics Concern    Not on file   Social History Narrative    Not on file     Social Drivers of Health     Financial Resource Strain: Not on file   Food Insecurity: Not on file   Transportation Needs: Not on file   Physical Activity: Not on file   Stress: Not on file   Social Connections: Not on file   Intimate Partner Violence: Not on file   Housing Stability: Not on file      Atorvastatin, Flecainide, Rosuvastatin, Statins-hmg-coa reductase inhibitors, Sulfa (sulfonamide antibiotics), and Procainamide   Current Outpatient Medications   Medication Sig Dispense Refill    betamethasone valerate (Valisone) 0.1 % cream Apply topically 2 times a day. 15 g 0    calcium carbonate-vitamin D3 (Oyster Shell) 250 mg-3.125 mcg (125 unit) tablet Take 1 tablet by mouth 2 times daily (morning and late afternoon).      carvedilol (Coreg) 25 mg tablet Take 1 tablet (25 mg) by mouth 2 times a day. 180 tablet 1    cholecalciferol (Vitamin D-3) 25 MCG (1000 UT) tablet Take 1 tablet (25 mcg) by mouth once daily.      cyanocobalamin (Vitamin B-12) 1,000 mcg tablet Take 2 tablets (2,000 mcg) by mouth once daily. (Patient taking differently: Take 2 tablets (2,000 mcg) by mouth.)      dilTIAZem CD (Cardizem CD) 180 mg 24 hr capsule Take 1 capsule (180 mg) by mouth once daily. 90 capsule 1    empagliflozin (Jardiance) 25 mg Take 0.5 tablets (12.5 mg) by mouth once daily.      enoxaparin (Lovenox) 100 mg/mL syringe Inject 1 mL (100 mg) under the skin every 12 hours. 10 each 0    ezetimibe (Zetia) 10 mg tablet Take 1 tablet (10 mg) by mouth once daily at bedtime. 90 tablet 1    fluticasone (Flonase) 50 mcg/actuation nasal spray Administer 1 spray into each nostril once daily. Shake gently. Before first use,  prime pump. After use, clean tip and replace cap. 54 g 3    furosemide (Lasix) 20 mg tablet Take 1 tablet (20 mg) by mouth once daily. 90 tablet 1    isosorbide mononitrate ER (Imdur) 30 mg 24 hr tablet Take 1 tablet (30 mg) by mouth once daily. 90 tablet 1    metFORMIN (Glucophage) 500 mg tablet Take 1 tablet (500 mg) by mouth 2 times daily (morning and late afternoon). 180 tablet 1    pravastatin (Pravachol) 20 mg tablet Take 1 tablet (20 mg) by mouth once daily. 90 tablet 1    sertraline (Zoloft) 100 mg tablet Take 1 tablet (100 mg) by mouth once daily. 90 tablet 1    tamsulosin (Flomax) 0.4 mg 24 hr capsule Take 2 capsules (0.8 mg) by mouth once daily. 90 capsule 3    valsartan (Diovan) 160 mg tablet Take 1 tablet (160 mg) by mouth 2 times a day. 180 tablet 1    warfarin (Coumadin) 5 mg tablet As directed to maintain therapeutic INR.  Number of tablets equals 90 day supply.  (Taking 7.5 mg Tues and Thurs, 5 mg all other days of the week) 110 tablet 1     No current facility-administered medications for this visit.       Immunization History   Administered Date(s) Administered    Flu vaccine (IIV4), preservative free *Check age/dose* 10/08/2020    Flu vaccine, quadrivalent, high-dose, preservative free, age 65y+ (FLUZONE) 10/03/2023    Flu vaccine, trivalent, preservative free, HIGH-DOSE, age 65y+ (Fluzone) 11/30/2017, 10/16/2018, 10/17/2019, 11/05/2024    Influenza, Seasonal, Quadrivalent, Adjuvanted 11/22/2021, 11/07/2022    Influenza, Unspecified 10/01/2010, 01/01/2014, 02/01/2015, 10/01/2018, 11/01/2022    Influenza, live, intranasal, quadrivalent 11/22/2021    Influenza, seasonal, injectable 01/15/2014, 11/28/2018, 08/01/2019, 10/01/2020, 09/13/2021, 09/19/2022    Pfizer COVID-19 vaccine, 12 years and older, (30mcg/0.3mL) (Comirnaty) 01/26/2024    Pfizer Gray Cap SARS-CoV-2 05/05/2022    Pfizer Purple Cap SARS-CoV-2 02/10/2021, 03/02/2021, 10/13/2021, 02/07/2022    Pneumococcal Conjugate PCV 7 1945     Pneumococcal conjugate vaccine, 13-valent (PREVNAR 13) 03/03/2017    Pneumococcal polysaccharide vaccine, 23-valent, age 2 years and older (PNEUMOVAX 23) 02/10/2014, 07/30/2019, 08/16/2021    Tdap vaccine, age 7 year and older (BOOSTRIX, ADACEL) 07/15/2024    Zoster vaccine, recombinant, adult (SHINGRIX) 06/19/2019, 08/19/2019    Zoster, Unspecified 07/01/2019, 08/31/2019        Review of Systems   Constitutional: Negative.    HENT: Negative.     Eyes: Negative.    Respiratory: Negative.     Cardiovascular: Negative.    Gastrointestinal: Negative.    Endocrine: Negative.    Genitourinary: Negative.    Musculoskeletal: Negative.    Skin: Negative.    Allergic/Immunologic: Negative.    Hematological: Negative.    Psychiatric/Behavioral: Negative.     All other systems reviewed and are negative.       Vitals:    11/21/24 1150   BP: 177/90   Pulse: 70   Temp: 36.7 °C (98.1 °F)     Vitals:    11/21/24 1150   Weight: 96.3 kg (212 lb 6.4 oz)      Physical Exam  Constitutional:       General: He is not in acute distress.     Appearance: Normal appearance.   Cardiovascular:      Rate and Rhythm: Normal rate and regular rhythm.      Pulses: Normal pulses.      Heart sounds: Murmur (S1-S2 with a 1/6 to 2/6 systolic ejection murmur at the left sternal border) heard.      No friction rub. No gallop.   Pulmonary:      Effort: Pulmonary effort is normal. No respiratory distress.      Breath sounds: Normal breath sounds. No wheezing or rales.   Neurological:      Mental Status: He is alert.          ASSESSMENT/PLAN: Mellitus type II stable with A1c 7.0.  Continue metformin and Jardiance daily as noted.  Eye examinations up-to-date.  Check urine for microalbumin creatinine ratio with CMP and A1c in 3 months.    Hyperlipidemia.  Continue pravastatin and ezetimibe.    Hypertension with elevated reading.  Follow-up with cardiology as recommended.  Continue current medications as noted    Prostate cancer being evaluated and managed by  urology    Renal mass managed by urology    Restrictive lung disease managed by pulmonary    Vitamin B12 deficiency improved.  Continue vitamin B12 supplement.    Follow-up in 3 months and call as needed   Scribe Attestation  By signing my name below, I, Juliann Tamayo LPN, Scribe   attest that this documentation has been prepared under the direction and in the presence of Juan West MD.

## 2024-11-26 ENCOUNTER — TRANSCRIBE ORDERS (OUTPATIENT)
Dept: CARDIOLOGY | Facility: CLINIC | Age: 79
End: 2024-11-26
Payer: MEDICARE

## 2024-11-26 DIAGNOSIS — Z95.0 PRESENCE OF CARDIAC PACEMAKER: ICD-10-CM

## 2024-12-03 ENCOUNTER — LAB (OUTPATIENT)
Dept: LAB | Facility: LAB | Age: 79
End: 2024-12-03
Payer: MEDICARE

## 2024-12-03 DIAGNOSIS — Z79.01 ANTICOAGULANT LONG-TERM USE: ICD-10-CM

## 2024-12-03 LAB
INR PPP: 3 (ref 0.9–1.1)
PROTHROMBIN TIME: 33.7 SECONDS (ref 9.8–12.8)

## 2024-12-03 PROCEDURE — 36415 COLL VENOUS BLD VENIPUNCTURE: CPT

## 2024-12-03 PROCEDURE — 85610 PROTHROMBIN TIME: CPT

## 2024-12-04 ENCOUNTER — ANTICOAGULATION - WARFARIN VISIT (OUTPATIENT)
Dept: CARDIOLOGY | Facility: CLINIC | Age: 79
End: 2024-12-04
Payer: MEDICARE

## 2024-12-04 NOTE — PROGRESS NOTES
Called and spoke with Mando Tay regarding INR 3.0 with goal 2.5 - 3.5.  Will continue current coumadin dosing.  Repeat INR in 4 weeks approximately 1/2/2025.    Mr Tay verbalizes understanding.

## 2024-12-05 ENCOUNTER — TELEPHONE (OUTPATIENT)
Dept: CARDIOLOGY | Facility: CLINIC | Age: 79
End: 2024-12-05
Payer: MEDICARE

## 2024-12-05 LAB
AP SUMMARY REPORT: NORMAL
SCAN RESULT: NORMAL

## 2024-12-05 NOTE — TELEPHONE ENCOUNTER
I spoke with  Maggi and after making him aware Dr. Celis was the one who referred him to the [facility] in [place]. Patient states he now just wants to stay at [facility] and new orders faxed. Patient verbalized understanding.

## 2024-12-09 DIAGNOSIS — Z79.01 ANTICOAGULANT LONG-TERM USE: ICD-10-CM

## 2024-12-09 RX ORDER — WARFARIN SODIUM 5 MG/1
TABLET ORAL
Qty: 110 TABLET | Refills: 1 | Status: SHIPPED | OUTPATIENT
Start: 2024-12-09

## 2024-12-11 ENCOUNTER — HOSPITAL ENCOUNTER (OUTPATIENT)
Dept: RESPIRATORY THERAPY | Facility: HOSPITAL | Age: 79
Discharge: HOME | End: 2024-12-11
Payer: MEDICARE

## 2024-12-11 DIAGNOSIS — J98.4 OTHER DISORDERS OF LUNG: ICD-10-CM

## 2024-12-11 LAB
ARTERIAL PATENCY WRIST A: ABNORMAL
BASE EXCESS BLDA CALC-SCNC: 3.9 MMOL/L (ref -2–3)
BODY TEMPERATURE: ABNORMAL
HCO3 BLDA-SCNC: 28.5 MMOL/L (ref 22–26)
OXYHGB MFR BLDA: 94.9 % (ref 94–98)
PCO2 BLDA: 42 MM HG (ref 38–42)
PH BLDA: 7.44 PH (ref 7.38–7.42)
PO2 BLDA: 81 MM HG (ref 85–95)
SAO2 % BLDA: 96 % (ref 94–100)
SITE OF ARTERIAL PUNCTURE: ABNORMAL

## 2024-12-11 PROCEDURE — 82810 BLOOD GASES O2 SAT ONLY: CPT

## 2025-01-02 ENCOUNTER — LAB (OUTPATIENT)
Dept: LAB | Facility: LAB | Age: 80
End: 2025-01-02
Payer: MEDICARE

## 2025-01-02 DIAGNOSIS — Z79.01 ANTICOAGULANT LONG-TERM USE: ICD-10-CM

## 2025-01-02 LAB
INR PPP: 2.5 (ref 0.9–1.1)
PROTHROMBIN TIME: 28.2 SECONDS (ref 9.8–12.8)

## 2025-01-02 PROCEDURE — 85610 PROTHROMBIN TIME: CPT

## 2025-01-03 ENCOUNTER — ANTICOAGULATION - WARFARIN VISIT (OUTPATIENT)
Dept: CARDIOLOGY | Facility: CLINIC | Age: 80
End: 2025-01-03
Payer: MEDICARE

## 2025-01-03 NOTE — PROGRESS NOTES
Called and spoke with Mando Tay regarding INR 2.5 with goal 2.5- 3.5.  Reports not change in diet other than increased sweets over the holidays.     Continue Coumadin to 7.5 mg M-F  only along with  Coumadin 5 mg all other days.     Repeat INR 2 weeks, approximately  1/15/2025.    Mando verbalizes understanding.

## 2025-01-06 ENCOUNTER — TELEPHONE (OUTPATIENT)
Dept: UROLOGY | Facility: CLINIC | Age: 80
End: 2025-01-06
Payer: MEDICARE

## 2025-01-06 DIAGNOSIS — N13.8 BPH WITH OBSTRUCTION/LOWER URINARY TRACT SYMPTOMS: ICD-10-CM

## 2025-01-06 DIAGNOSIS — E08.65 DIABETES MELLITUS DUE TO UNDERLYING CONDITION WITH HYPERGLYCEMIA, UNSPECIFIED WHETHER LONG TERM INSULIN USE: ICD-10-CM

## 2025-01-06 DIAGNOSIS — F32.5 MAJOR DEPRESSIVE DISORDER WITH SINGLE EPISODE, IN FULL REMISSION (CMS-HCC): ICD-10-CM

## 2025-01-06 DIAGNOSIS — N40.1 BPH WITH OBSTRUCTION/LOWER URINARY TRACT SYMPTOMS: ICD-10-CM

## 2025-01-09 NOTE — TELEPHONE ENCOUNTER
Spoke with pt. States he does not need any refills at this time, requested office disregard pending order from [facility] and remove pharmacy from preference list. States he is using [facility] pharmacy in [place]. Pt records updated accordingly.

## 2025-01-10 RX ORDER — METFORMIN HYDROCHLORIDE 500 MG/1
500 TABLET ORAL
Qty: 90 TABLET | Refills: 1 | Status: SHIPPED | OUTPATIENT
Start: 2025-01-10

## 2025-01-10 RX ORDER — SERTRALINE HYDROCHLORIDE 100 MG/1
100 TABLET, FILM COATED ORAL DAILY
Qty: 90 TABLET | Refills: 1 | Status: SHIPPED | OUTPATIENT
Start: 2025-01-10

## 2025-01-15 DIAGNOSIS — N40.1 BPH WITH OBSTRUCTION/LOWER URINARY TRACT SYMPTOMS: ICD-10-CM

## 2025-01-15 DIAGNOSIS — N13.8 BPH WITH OBSTRUCTION/LOWER URINARY TRACT SYMPTOMS: ICD-10-CM

## 2025-01-15 RX ORDER — TAMSULOSIN HYDROCHLORIDE 0.4 MG/1
CAPSULE ORAL
Qty: 90 CAPSULE | Refills: 0 | OUTPATIENT
Start: 2025-01-15

## 2025-01-15 RX ORDER — TAMSULOSIN HYDROCHLORIDE 0.4 MG/1
0.8 CAPSULE ORAL DAILY
Qty: 90 CAPSULE | Refills: 3 | Status: SHIPPED | OUTPATIENT
Start: 2025-01-15

## 2025-01-16 ENCOUNTER — LAB (OUTPATIENT)
Dept: LAB | Facility: LAB | Age: 80
End: 2025-01-16
Payer: MEDICARE

## 2025-01-16 ENCOUNTER — APPOINTMENT (OUTPATIENT)
Dept: UROLOGY | Facility: CLINIC | Age: 80
End: 2025-01-16
Payer: MEDICARE

## 2025-01-16 VITALS — DIASTOLIC BLOOD PRESSURE: 91 MMHG | TEMPERATURE: 97.9 F | SYSTOLIC BLOOD PRESSURE: 187 MMHG | HEART RATE: 90 BPM

## 2025-01-16 DIAGNOSIS — Z79.01 ANTICOAGULANT LONG-TERM USE: ICD-10-CM

## 2025-01-16 DIAGNOSIS — C61 PROSTATE CANCER (MULTI): ICD-10-CM

## 2025-01-16 LAB
INR PPP: 2.2 (ref 0.9–1.1)
PROTHROMBIN TIME: 25.5 SECONDS (ref 9.8–12.8)

## 2025-01-16 PROCEDURE — 1123F ACP DISCUSS/DSCN MKR DOCD: CPT | Performed by: UROLOGY

## 2025-01-16 PROCEDURE — G2211 COMPLEX E/M VISIT ADD ON: HCPCS | Performed by: UROLOGY

## 2025-01-16 PROCEDURE — 3080F DIAST BP >= 90 MM HG: CPT | Performed by: UROLOGY

## 2025-01-16 PROCEDURE — 99214 OFFICE O/P EST MOD 30 MIN: CPT | Performed by: UROLOGY

## 2025-01-16 PROCEDURE — 3077F SYST BP >= 140 MM HG: CPT | Performed by: UROLOGY

## 2025-01-16 PROCEDURE — 85610 PROTHROMBIN TIME: CPT

## 2025-01-16 ASSESSMENT — ENCOUNTER SYMPTOMS
OCCASIONAL FEELINGS OF UNSTEADINESS: 0
LOSS OF SENSATION IN FEET: 0
DEPRESSION: 0

## 2025-01-16 NOTE — PROGRESS NOTES
"Subjective   Patient ID: Mando Tay is a 79 y.o. male who presents for Results. Last seen 11/19/24 when Biopsy findings of prostatic adenocarcinoma with samples of Rik 4+3=7, GG 3, upto 25% of tissue samples were involved.  His last PSA was 8.17. He had 4 positive samples; 2 were graded as a [place] 6 and the other 2 were [place] 7. We discussed sending his biopsy sample for genetic testing to help inform treatment options. My inclination is to recommend active surveillance with consideration of these factors and the patient's age, 79. We very briefly reviewed treatment options but will defer further treatment discussion until the genetic test results are back.   After our long discussion today, I recommend that we have the genetic testing done and follow-up in 6 weeks with results and a treatment decision. Re[facility] about his diagnosis of prostate cancer. Order Decipher genetic test.      HPI  The patient relates that he had a friend who had radiation treatment and described it as \"most unpleasant.\"    The patient is originally from [place]. He met her in [place] at [employer] while on leave from the . He served in [place] during the first year of his marriage. His wife did not care for living in [place], so they moved back to [place].     Decipher Results  DECIPHER GENOMIC RISK RESULTS:   GENOMIC RISK IS:   HIGH  (0.77)     Review of Systems  A 12 system review was completed and is negative with the exception of those signs and symptoms noted in the history of present illness.    Objective   Physical Exam  General: in NAD, appears stated age  Head: normocephalic, atraumatic  Respiratory: normal effort, no use of accessory muscles  Cardiovascular: no edema noted  Skin: normal turgor, no rashes  Neurologic: grossly intact, oriented to person/place/time  Psychiatric: mode and affect appropriate     Assessment/Plan     We discussed the Decipher genetic testing results. " Based on his [place] score we would have expected an intermediate risk, however, his genetic testing indicates that treatment is indicated.     I explained that surgery is almost never done in a man of 79 years of age, particularly with heart health issues. Radiation would be the best treatment in this case. I recommend that the patient consult with the radiation oncologist to make a better informed decision. Alternately, there is a hormone suppression therapy that can be done, but it has side effects and can become ineffective after time.     All questions and concerns were addressed. Patient verbalizes understanding and has no other questions at this time.  Referral to Dr. Joshua in Radiation Oncology. Order PSA for 6 months and follow-up with results.     Follow-up in 6 months for continued management of prostate cancer.     Scribe Attestation  By signing my name below, I, Lucinda Montano   attest that this documentation has been prepared under the direction and in the presence of Rafael Harp MD.

## 2025-01-17 ENCOUNTER — ANTICOAGULATION - WARFARIN VISIT (OUTPATIENT)
Dept: CARDIOLOGY | Facility: CLINIC | Age: 80
End: 2025-01-17
Payer: MEDICARE

## 2025-01-17 NOTE — PROGRESS NOTES
Called and spoke with Mando Tay regarding INR 2.2  INR Goal 2.5 - 3.5. he reorts no change in mediations or diet.      Will change current  Coumadin dose to 7.5 mg M-Thur and Sat, along with  Coumadin 5 mg all other days.     Repeat INR 2 weeks, approximately  1/29/2025.    Mr. Tay verbalizes understanding.

## 2025-01-24 ENCOUNTER — HOSPITAL ENCOUNTER (OUTPATIENT)
Dept: RADIATION ONCOLOGY | Facility: CLINIC | Age: 80
Setting detail: RADIATION/ONCOLOGY SERIES
Discharge: HOME | End: 2025-01-24
Payer: MEDICARE

## 2025-01-24 VITALS
HEIGHT: 68 IN | OXYGEN SATURATION: 92 % | BODY MASS INDEX: 32.54 KG/M2 | DIASTOLIC BLOOD PRESSURE: 74 MMHG | RESPIRATION RATE: 18 BRPM | HEART RATE: 81 BPM | SYSTOLIC BLOOD PRESSURE: 178 MMHG | WEIGHT: 214.73 LBS | TEMPERATURE: 97.7 F

## 2025-01-24 DIAGNOSIS — C61 MALIGNANT NEOPLASM OF PROSTATE (MULTI): Primary | ICD-10-CM

## 2025-01-24 DIAGNOSIS — C61 PROSTATE CANCER (MULTI): ICD-10-CM

## 2025-01-24 PROCEDURE — 99205 OFFICE O/P NEW HI 60 MIN: CPT | Performed by: RADIOLOGY

## 2025-01-24 PROCEDURE — 99215 OFFICE O/P EST HI 40 MIN: CPT | Performed by: RADIOLOGY

## 2025-01-24 PROCEDURE — G2211 COMPLEX E/M VISIT ADD ON: HCPCS | Performed by: RADIOLOGY

## 2025-01-24 ASSESSMENT — NCCN CANCER DISTRESS MANAGEMENT
NCCN PHYSICAL CONCERNS: 3
NCCN PRACTICAL CONCERNS: 12
NCCN PRACTICAL CONCERNS: 6

## 2025-01-24 ASSESSMENT — ENCOUNTER SYMPTOMS
LEG SWELLING: 1
FATIGUE: 0
DIFFICULTY URINATING: 0
HEMATURIA: 0
ENDOCRINE NEGATIVE: 1
BACK PAIN: 0
DIAPHORESIS: 0
CHILLS: 0
LOSS OF SENSATION IN FEET: 0
UNEXPECTED WEIGHT CHANGE: 0
MYALGIAS: 0
NECK PAIN: 0
BRUISES/BLEEDS EASILY: 1
EYES NEGATIVE: 1
FLANK PAIN: 0
APPETITE CHANGE: 0
GASTROINTESTINAL NEGATIVE: 1
PALPITATIONS: 0
CONFUSION: 0
DYSURIA: 0
SLEEP DISTURBANCE: 0
NERVOUS/ANXIOUS: 1
DECREASED CONCENTRATION: 0
FEVER: 0
FREQUENCY: 1
DEPRESSION: 1
RESPIRATORY NEGATIVE: 1
DEPRESSION: 0
ADENOPATHY: 0
ARTHRALGIAS: 0
NEUROLOGICAL NEGATIVE: 1
NECK STIFFNESS: 0
OCCASIONAL FEELINGS OF UNSTEADINESS: 0

## 2025-01-24 ASSESSMENT — PAIN SCALES - GENERAL: PAINLEVEL_OUTOF10: 0-NO PAIN

## 2025-01-24 NOTE — PROGRESS NOTES
Radiation Oncology Outpatient Consult    Patient Name:  Mando Tay  MRN:  [id number]  :  1945    Referring Provider: Rafael Harp MD  Primary Care Provider: Juan West MD  Care Team: Patient Care Team:  Juan West MD as PCP - General  Juan West MD as PCP - [facility] [place]ributed [facility]  Ck Celis MD as Consulting Physician (Cardiology)    Date of Service: 2025       SUMMARY: 79 y.o. male with unfavorable-intermediate risk prostate cancer, [id number], Rik score 4+3=7, 4/12 positive cores, pPSA 8.17, Grade group 3. Decipher 0.77. Pending PSMA PET/CT    SUBJECTIVE  History of Present Illness:  Mando Tay is a 79 y.o. male who was referred by Rafael Harp MD, for a consultation to the [facility] Department of Radiation Oncology.  He is presenting for evaluation and management of prostate cancer    He initially presented to Urology with an elevated PSA.  He underwent a biopsy  which demonstrated Cowden score 4+3=7, 4/12 positive cores. Decipher 0.77  MRI of the prostate was not obtained. PSMA PET/CT is pending    Today, the patient reports feeling well overall. He denies changes in his symptoms.  His sexual Health Inventory for Men score (LA) is 1. His International Prostate Symptom Score (IPSS) score is 9. He denies diarrhea or constipation. He denies bone pain.    Prior Radiotherapy:  No  No radiation treatments to show. (Treatments may have been administered in another system.)      Current Systemic Treatment:  No     Presence of Pacemaker or ICD:  Yes     History of Autoimmune or Connective Tissue Disorders:  No     Pain: The patient's current pain level was assessed.  They report currently having a pain of 0 out of 10.  They feel their pain is under control without the use of pain medications.     Review of Systems:  Review of Systems   Constitutional:  Negative for appetite change, chills, diaphoresis, fatigue, fever and  unexpected weight change.   HENT:  Negative.     Eyes: Negative.    Respiratory: Negative.     Cardiovascular:  Positive for leg swelling (has baseline; on diuretic). Negative for chest pain and palpitations.   Gastrointestinal: Negative.    Endocrine: Negative.    Genitourinary:  Positive for bladder incontinence (leaks at times), frequency and nocturia (1-2x). Negative for difficulty urinating, dyspareunia, dysuria, hematuria, pelvic pain and penile discharge.    Musculoskeletal:  Negative for arthralgias, back pain, flank pain, myalgias, neck pain and neck stiffness.   Skin: Negative.    Neurological: Negative.    Hematological:  Negative for adenopathy. Bruises/bleeds easily (on thinners).   Psychiatric/Behavioral:  Positive for depression (on zoloft and is under control). Negative for confusion, decreased concentration, sleep disturbance and suicidal ideas. The patient is nervous/anxious (on zoloft and is under control).        Past Surgical History:    Past Surgical History:   Procedure Laterality Date    CORONARY ARTERY BYPASS GRAFT  02/22/2017    Coronary Artery Surgery    HERNIA REPAIR  02/22/2017    Inguinal Hernia Repair    INSERT / REPLACE / REMOVE PACEMAKER      x3    OTHER SURGICAL HISTORY  11/17/2021    Surgery    OTHER SURGICAL HISTORY  11/17/2021    Cardioversion    OTHER SURGICAL HISTORY  12/20/2021    Mitral valve replacement    OTHER SURGICAL HISTORY  12/20/2021    Cardiac catheterization    PROSTATE BIOPSY  10/29/2024    RENAL MASS EXCISION Left 04/2024    tumor removed        Family History:  Cancer-related family history includes Stomach cancer in his father.    Social History:    Social History     Tobacco Use    Smoking status: Never     Passive exposure: Past    Smokeless tobacco: Never   Vaping Use    Vaping status: Never Used   Substance Use Topics    Alcohol use: Not Currently     Comment: not for years    Drug use: Never       Allergies:    Allergies   Allergen Reactions    Atorvastatin  Myalgia    Flecainide Unknown    Rosuvastatin Myalgia    Statins-Hmg-Coa Reductase Inhibitors Unknown     Statins do not work    Sulfa (Sulfonamide Antibiotics) Unknown    Procainamide Rash        Medications:    Current Outpatient Medications:     betamethasone valerate (Valisone) 0.1 % cream, Apply topically 2 times a day., Disp: 15 g, Rfl: 0    calcium carbonate-vitamin D3 (Oyster Shell) 250 mg-3.125 mcg (125 unit) tablet, Take 1 tablet by mouth 2 times daily (morning and late afternoon)., Disp: , Rfl:     carvedilol (Coreg) 25 mg tablet, Take 1 tablet (25 mg) by mouth 2 times a day., Disp: 180 tablet, Rfl: 0    cholecalciferol (Vitamin D-3) 25 MCG (1000 UT) tablet, Take 1 tablet (25 mcg) by mouth once daily., Disp: , Rfl:     dilTIAZem CD (Cardizem CD) 180 mg 24 hr capsule, Take 1 capsule (180 mg) by mouth once daily., Disp: 90 capsule, Rfl: 0    empagliflozin (Jardiance) 25 mg, Take 0.5 tablets (12.5 mg) by mouth once daily., Disp: , Rfl:     ezetimibe (Zetia) 10 mg tablet, Take 1 tablet (10 mg) by mouth once daily., Disp: 90 tablet, Rfl: 0    fluticasone (Flonase) 50 mcg/actuation nasal spray, Administer 1 spray into each nostril once daily. Shake gently. Before first use, prime pump. After use, clean tip and replace cap., Disp: 54 g, Rfl: 3    furosemide (Lasix) 20 mg tablet, Take 1 tablet (20 mg) by mouth once daily., Disp: 90 tablet, Rfl: 0    isosorbide mononitrate ER (Imdur) 30 mg 24 hr tablet, Take 1 tablet (30 mg) by mouth once daily., Disp: 90 tablet, Rfl: 0    metFORMIN (Glucophage) 500 mg tablet, Take 1 tablet (500 mg) by mouth once daily with breakfast., Disp: 90 tablet, Rfl: 1    pravastatin (Pravachol) 20 mg tablet, Take 1 tablet (20 mg) by mouth once daily at bedtime., Disp: 90 tablet, Rfl: 0    sertraline (Zoloft) 100 mg tablet, Take 1 tablet (100 mg) by mouth once daily., Disp: 90 tablet, Rfl: 1    tamsulosin (Flomax) 0.4 mg 24 hr capsule, Take 2 capsules (0.8 mg) by mouth once daily., Disp: 90  "capsule, Rfl: 3    valsartan (Diovan) 160 mg tablet, Take 1 tablet (160 mg) by mouth 2 times a day., Disp: 180 tablet, Rfl: 0    warfarin (Coumadin) 5 mg tablet, Take 7.5 mg Monday- Friday and 5 mg all other days or as directed to maintain therapeutic INR as directed per physician., Disp: 110 tablet, Rfl: 0    cyanocobalamin (Vitamin B-12) 1,000 mcg tablet, Take 2 tablets (2,000 mcg) by mouth once daily. (Patient taking differently: Take 2 tablets (2,000 mcg) by mouth.), Disp: , Rfl:           Performance Status:  ECOG 2          OBJECTIVE  Physical Exam:  /74 (BP Location: Right arm, Patient Position: Sitting, BP Cuff Size: Adult)   Pulse 81   Temp 36.5 °C (97.7 °F) (Temporal)   Resp 18   Ht (S) 1.73 m (5' 8.11\")   Wt 97.4 kg (214 lb 11.7 oz)   SpO2 92%   BMI 32.54 kg/m²    Constitutional: Awake, alert and oriented. No acute distress. Appears stated age.  Eyes: Conjunctivae are clear without exudates or hemorrhage. Eyelids are normal in appearance without swelling or lesions.  ENMT: mucous membranes moist, no apparent injury, no lesions seen  Neck: Neck supple, no apparent injury, trachea midline  Resp/Thorax: Patent airways,  good chest expansion. Thorax symmetric  Cardiovascular: The external chest is normal without lifts, heaves, or thrills. PMI is not visible.  GI: Nondistended, soft, non-tender.  /Gyn: Deferred  MSK: No tenderness noted on palpation of the spine. No ROM limitations.  Extremities: normal extremities, no cyanosis, erythema or edema.  Neurological: alert and oriented x3. Sensory and motor function appear intact. No gait abnormality observed.  Psych: Appropriate mood and behavior.  Skin: Warm and dry, no new lesions or rashes.      Laboratory Review:      Lab Results   Component Value Date    PSA 8.17 (H) 08/29/2024    PSA 4.59 (H) 02/01/2023    PSA 2.60 09/16/2021    WBC 5.5 04/10/2024    HGB 13.6 04/10/2024     (L) 04/10/2024    CREATININE 1.33 (H) 10/10/2024       The " pertinent lab results were reviewed and discussed with the patient.      Pathology Review:  The pertinent pathology results were reviewed and discussed with the patient.  Notably,      FINAL DIAGNOSIS   A. PROSTATE, BIOPSY, LEFT BASE:   Prostatic adenocarcinoma, acinar type, Houston score 6 (3+3), grade group 1, involving 1 of 2 cores and approximately less than 5% of submitted tissue  High-grade prostatic intraepithelial neoplasia     Note: No intraductal carcinoma identified.  Immunostain cocktail PIN4 is consistent with the above diagnosis.      B. PROSTATE, BIOPSY, LEFT MID:   High-grade prostatic intraepithelial neoplasia with associated atypical glandular proliferation     C. PROSTATE, BIOPSY, LEFT APEX:   High-grade prostatic intraepithelial neoplasia     D. PROSTATE, BIOPSY, RIGHT BASE:   High-grade prostatic intraepithelial neoplasia     E. PROSTATE, BIOPSY, RIGHT MID:   Prostatic adenocarcinoma, acinar type, Houston score 6 (3+3), grade group 1, involving 1 of 2 cores and approximately less than 5% of submitted tissue  High-grade prostatic intraepithelial neoplasia     Note: No intraductal carcinoma identified.  Immunostain cocktail PIN4 is consistent with the above diagnosis.      F. PROSTATE, BIOPSY, RIGHT APEX:   Prostatic adenocarcinoma, acinar type, Houston score 7 (4+3), grade group 3, involving 2 of 2 cores and approximately 25% of submitted tissue   - Percentage of Rik pattern 4: 70%     Note: No intraductal carcinoma or cribriform     Summary Report A complete Decipher Prostate Biopsy Genomic  test result issued by [employer] ([place], [place]) is on file in the Department of Anatomic Pathology at [facility].    DECIPHER GENOMIC RISK RESULTS:    GENOMIC RISK IS:   HIGH  (0.77)            Imaging:  pending       ASSESSMENT:  Mando Tay is a 79 y.o. male with Malignant neoplasm of prostate (Multi), Clinical: Stage Unknown (cT1c, cNX, PSA: 8.2, Grade  Group: 3).      COUNSELING AND COORDINATION OF CARE:    The natural history of prostate cancer was reviewed with the patient. Prognostic factors including the following were discussed: clinical staging by DEEPTI, pretreatment PSA, GS/grade group on biopsy, and the number of biopsy cores involved with cancer, cancer volume, and the patient's performance status/comorbidities.    The patient's the clinical presentation, PSA lab findings and imaging findings were presented. Based on his risk factors, he would be classified as unfavorable intermediate risk.    We discussed with the patient treatment options including radical prostatectomy, radiation therapy including stereotactic body radiation therapy, standard and hypofractionated radiation therapy with or without short term androgen deprivation therapy using photons, protons and brachytherapy. Potential benefit, side effects and complications of each treatment was discussed.    The indications, benefits, side effects and complications of radiotherapy, which include both acute and late side effects were highlighted. Acute: Fatigue, dysuria, frequency, urine retention, rectal urgency, diarrhea. Late: Stricture, cystitis, proctitis, sexual dysfunction, second malignancy have all been discussed in detail with the patient. All questions were answered to the patient´s satisfaction.    We discussed the importance of fiducials and SpaceOAR for SBRT and proton radiation therapy.  We also discussed the benefit of SpaceOAR for all patients undergoing radiation therapy to help spare rectal toxicity.  We briefly discussed the process of prostate seed implant.     The patient had all questions answered and concerns addressed during the visit. The patient appears to be leaning towards radiotherapy at this time and would like to initiate preperation for treatment.          PLAN:    Radiation Intent to treat orders have been placed in Epic   Staging PSMA PET/CT ordered   Patient can't  get MRI   Pacemaker   3 months of orgovyx   Patient willing to consider [facility] for treatment     NCCN Guidelines were applicable to guide this patients treatment plan.   Mario Joshua MD       Future Appointments   Date Time Provider Department Center   2/20/2025 12:30 PM Ck Celis MD [id number] [place]   3/24/2025 11:00 AM Devan Fair MD [id number] [place]   3/27/2025 11:30 AM Juan West MD [id number] [place]   4/7/2025 11:00 AM [id number] [id number] CT 1 [id number] [place]    7/16/2025 11:20 AM Rafael Harp MD [id number] [place]

## 2025-01-24 NOTE — PROGRESS NOTES
Radiation Oncology Nursing Note    Prior Radiotherapy:  No  No radiation treatments to show. (Treatments may have been administered in another system.)     Current Systemic Treatment:  No     Presence of Pacemaker or ICD:  Yes    History of Autoimmune or Connective Tissue Disorders:  No    Pain: The patient's current pain level was assessed.  They report currently having a pain of 0 out of 10.  They feel their pain is under control without the use of pain medications.    Review of Systems:  Review of Systems   Constitutional:  Negative for appetite change, chills, diaphoresis, fatigue, fever and unexpected weight change.   HENT:  Negative.     Eyes: Negative.    Respiratory: Negative.     Cardiovascular:  Positive for leg swelling (has baseline; on diuretic). Negative for chest pain and palpitations.   Gastrointestinal: Negative.    Endocrine: Negative.    Genitourinary:  Positive for bladder incontinence (leaks at times), frequency and nocturia (1-2x). Negative for difficulty urinating, dyspareunia, dysuria, hematuria, pelvic pain and penile discharge.    Musculoskeletal:  Negative for arthralgias, back pain, flank pain, myalgias, neck pain and neck stiffness.   Skin: Negative.    Neurological: Negative.    Hematological:  Negative for adenopathy. Bruises/bleeds easily (on thinners).   Psychiatric/Behavioral:  Positive for depression (on zoloft and is under control). Negative for confusion, decreased concentration, sleep disturbance and suicidal ideas. The patient is nervous/anxious (on zoloft and is under control).

## 2025-01-27 DIAGNOSIS — C61 MALIGNANT NEOPLASM OF PROSTATE (MULTI): Primary | ICD-10-CM

## 2025-01-28 ENCOUNTER — SPECIALTY PHARMACY (OUTPATIENT)
Dept: PHARMACY | Facility: CLINIC | Age: 80
End: 2025-01-28

## 2025-01-28 LAB
INR PPP: 2.7
PROTHROMBIN TIME: 26.6 SEC (ref 9–11.5)

## 2025-01-28 RX ORDER — RELUGOLIX 120 MG/1
1 TABLET, FILM COATED ORAL DAILY
Qty: 30 TABLET | Refills: 2 | Status: SHIPPED | OUTPATIENT
Start: 2025-01-28 | End: 2025-04-28

## 2025-01-29 ENCOUNTER — TELEPHONE (OUTPATIENT)
Dept: RADIATION ONCOLOGY | Facility: HOSPITAL | Age: 80
End: 2025-01-29
Payer: MEDICARE

## 2025-01-29 ENCOUNTER — ANTICOAGULATION - WARFARIN VISIT (OUTPATIENT)
Dept: CARDIOLOGY | Facility: CLINIC | Age: 80
End: 2025-01-29
Payer: MEDICARE

## 2025-01-29 NOTE — TELEPHONE ENCOUNTER
Called patient and reviewed with him medication interactions brought to our attention by pharmacy. Specifically the DDI of carvedilol and orgovyx. Discussed that this is a level D risk meaning proceeding is still advisable however to take medication 6hr interval carvedilol dosages. As well would like to have lab work done prior to starting and at 1m after starting medication and a 3m to ensure no harmful metabolic interactions. As well would like to see patient in clinic for cardiac exam at 1m to assess. Patient was amendable and verbalized understanding level D risk warning.

## 2025-01-29 NOTE — PROGRESS NOTES
Continue current Coumadin dose to 7.5 mg M-Thur and Sat, along with  Coumadin 5 mg all other days.     Repeat INR 3 weeks, approximately  2/19/2025.Called and spoke with Mando Tay regarding INR 2.7 with goal 2.5- 3.5.      Mando verbalizes understanding.

## 2025-01-31 ENCOUNTER — TELEPHONE (OUTPATIENT)
Dept: PRIMARY CARE | Facility: CLINIC | Age: 80
End: 2025-01-31
Payer: MEDICARE

## 2025-01-31 NOTE — TELEPHONE ENCOUNTER
Patient has pending Mar 27th appointment but asked if you would see him sooner. He was recently diagnosed with prostate cancer and is wanting to see only you before starting treatment.

## 2025-02-03 ENCOUNTER — TELEPHONE (OUTPATIENT)
Dept: RADIATION ONCOLOGY | Facility: CLINIC | Age: 80
End: 2025-02-03

## 2025-02-03 ENCOUNTER — APPOINTMENT (OUTPATIENT)
Dept: PRIMARY CARE | Facility: CLINIC | Age: 80
End: 2025-02-03
Payer: MEDICARE

## 2025-02-03 VITALS
DIASTOLIC BLOOD PRESSURE: 78 MMHG | BODY MASS INDEX: 32.52 KG/M2 | SYSTOLIC BLOOD PRESSURE: 163 MMHG | HEIGHT: 68 IN | HEART RATE: 73 BPM | TEMPERATURE: 97.7 F | WEIGHT: 214.6 LBS

## 2025-02-03 DIAGNOSIS — C61 MALIGNANT NEOPLASM OF PROSTATE (MULTI): Primary | ICD-10-CM

## 2025-02-03 PROCEDURE — 3077F SYST BP >= 140 MM HG: CPT | Performed by: FAMILY MEDICINE

## 2025-02-03 PROCEDURE — 1159F MED LIST DOCD IN RCRD: CPT | Performed by: FAMILY MEDICINE

## 2025-02-03 PROCEDURE — 1158F ADVNC CARE PLAN TLK DOCD: CPT | Performed by: FAMILY MEDICINE

## 2025-02-03 PROCEDURE — 1123F ACP DISCUSS/DSCN MKR DOCD: CPT | Performed by: FAMILY MEDICINE

## 2025-02-03 PROCEDURE — 3078F DIAST BP <80 MM HG: CPT | Performed by: FAMILY MEDICINE

## 2025-02-03 PROCEDURE — 99213 OFFICE O/P EST LOW 20 MIN: CPT | Performed by: FAMILY MEDICINE

## 2025-02-03 PROCEDURE — 1036F TOBACCO NON-USER: CPT | Performed by: FAMILY MEDICINE

## 2025-02-03 PROCEDURE — 1160F RVW MEDS BY RX/DR IN RCRD: CPT | Performed by: FAMILY MEDICINE

## 2025-02-03 NOTE — TELEPHONE ENCOUNTER
Reached out to patient to schedule spaceOAR and he states that he would like to wait 1-2 months before he decides about radiation.  He was informed by Khoa that there is a drug interaction between orgovyx and carvedilol but was given a solution.  He wants to wait to discuss everything on 2/20 with his cardiologist.  He will still get PET CT on 2/26.  He discussed radiation with his PCP and they cancelled the orgovyx.  Per our discussion I will touch base with him after 3/1 and will have Khoa reinstate the orgovx.

## 2025-02-03 NOTE — PROGRESS NOTES
Juan León is here for a consultation and discussion regarding ongoing treatment of his prostate cancer.  He would like my opinion regarding radiation therapy or watchful waiting.  He was diagnosed with prostate cancer by Dr. Harp.  On the basis of his Rik scoring and genetic testing, he was referred to radiation therapy for consultation and recommendations.  Radiation therapy was recommended in light of his overall prognostic indicators.  The patient is still unsure if he would like to proceed with radiation therapy or watchful waiting without therapy.  The patient does have a history of heart disease and other comorbidities which would make surgical prostatectomy complicated.  He continues on his other meds noted.  He sees Dr. Celis regularly for his cardiology care.  He has no other complaints at the present time    Patient ID: Mando Tay is a 79 y.o. male who presents for Follow-up (Discuss treatment regarding prostate ):    Problem List Items Addressed This Visit    None     Past Medical History:   Diagnosis Date    Acute myocardial infarction, unspecified 11/17/2021    Acute myocardial infarction    Arrhythmia     Asthma     Cutaneous T-cell lymphoma (Multi)     Depression     Diabetes mellitus (Multi)     Epididymitis 06/02/2022    Epididymitis, right    Essential (primary) hypertension 12/08/2022    Hypertension    Heart disease     Hyperlipidemia     Kidney tumor     Obesity, unspecified 04/04/2022    Class 1 obesity with body mass index (BMI) of 31.0 to 31.9 in adult    Obesity, unspecified 06/08/2022    Class 1 obesity with body mass index (BMI) of 30.0 to 30.9 in adult    Obesity, unspecified 01/10/2022    Class 1 obesity with body mass index (BMI) of 31.0 to 31.9 in adult    Obesity, unspecified 01/31/2022    Class 1 obesity with body mass index (BMI) of 31.0 to 31.9 in adult    Other abnormal findings in urine 05/09/2022    Dark urine    Other specified disorders of kidney and ureter      Bilateral renal masses    Other specified disorders of kidney and ureter 05/09/2022    Renal mass, right    Other specified disorders of the male genital organs 05/09/2022    Swelling of right half of scrotum    Other symptoms and signs involving the musculoskeletal system 11/17/2021    Leg fatigue    Personal history of other diseases of the circulatory system 11/17/2021    History of mitral valve insufficiency    Personal history of other diseases of the respiratory system 12/09/2021    History of acute bronchitis    Personal history of other specified conditions 04/04/2022    History of shortness of breath    Personal history of other specified conditions     History of snoring    Personal history of pneumonia (recurrent) 01/31/2022    History of pneumonia    Prediabetes 06/18/2018    Borderline diabetes mellitus    Scrotal pain 05/09/2022    Acute pain in scrotum    Shortness of breath 12/09/2022    Shortness of breath on exertion    Sleep apnea     cpap    Unspecified cataract 02/22/2017    Cataract of right eye    Unspecified cataract 02/22/2017    Cataract of left eye    Unspecified osteoarthritis, unspecified site     Arthritis      Past Surgical History:   Procedure Laterality Date    CORONARY ARTERY BYPASS GRAFT  02/22/2017    Coronary Artery Surgery    HERNIA REPAIR  02/22/2017    Inguinal Hernia Repair    INSERT / REPLACE / REMOVE PACEMAKER      x3    OTHER SURGICAL HISTORY  11/17/2021    Surgery    OTHER SURGICAL HISTORY  11/17/2021    Cardioversion    OTHER SURGICAL HISTORY  12/20/2021    Mitral valve replacement    OTHER SURGICAL HISTORY  12/20/2021    Cardiac catheterization    PROSTATE BIOPSY  10/29/2024    RENAL MASS EXCISION Left 04/2024    tumor removed      Family History   Problem Relation Name Age of Onset    Other (Cardiac disorder) Mother      Cataracts Mother      Hypertension Mother      Other (Cardiac disorder) Father      Stomach cancer Father      Hypertension Father        Social  History     Socioeconomic History    Marital status:      Spouse name: Not on file    Number of children: Not on file    Years of education: Not on file    Highest education level: Not on file   Occupational History    Not on file   Tobacco Use    Smoking status: Never     Passive exposure: Past    Smokeless tobacco: Never   Vaping Use    Vaping status: Never Used   Substance and Sexual Activity    Alcohol use: Not Currently     Comment: not for years    Drug use: Never    Sexual activity: Defer   Other Topics Concern    Not on file   Social History Narrative    Not on file     Social Drivers of Health     Financial Resource Strain: Not on file   Food Insecurity: Not on file   Transportation Needs: Not on file   Physical Activity: Not on file   Stress: Not on file   Social Connections: Not on file   Intimate Partner Violence: Not on file   Housing Stability: Not on file      Atorvastatin, Flecainide, Rosuvastatin, Statins-hmg-coa reductase inhibitors, Sulfa (sulfonamide antibiotics), and Procainamide   Current Outpatient Medications   Medication Sig Dispense Refill    betamethasone valerate (Valisone) 0.1 % cream Apply topically 2 times a day. 15 g 0    calcium carbonate-vitamin D3 (Oyster Shell) 250 mg-3.125 mcg (125 unit) tablet Take 1 tablet by mouth 2 times daily (morning and late afternoon).      carvedilol (Coreg) 25 mg tablet Take 1 tablet (25 mg) by mouth 2 times a day. 180 tablet 0    cholecalciferol (Vitamin D-3) 25 MCG (1000 UT) tablet Take 1 tablet (25 mcg) by mouth once daily.      cyanocobalamin (Vitamin B-12) 1,000 mcg tablet Take 2 tablets (2,000 mcg) by mouth once daily.      dilTIAZem CD (Cardizem CD) 180 mg 24 hr capsule Take 1 capsule (180 mg) by mouth once daily. 90 capsule 0    empagliflozin (Jardiance) 25 mg Take 0.5 tablets (12.5 mg) by mouth once daily.      ezetimibe (Zetia) 10 mg tablet Take 1 tablet (10 mg) by mouth once daily. 90 tablet 0    fluticasone (Flonase) 50 mcg/actuation  nasal spray Administer 1 spray into each nostril once daily. Shake gently. Before first use, prime pump. After use, clean tip and replace cap. 54 g 3    furosemide (Lasix) 20 mg tablet Take 1 tablet (20 mg) by mouth once daily. 90 tablet 0    isosorbide mononitrate ER (Imdur) 30 mg 24 hr tablet Take 1 tablet (30 mg) by mouth once daily. 90 tablet 0    metFORMIN (Glucophage) 500 mg tablet Take 1 tablet (500 mg) by mouth once daily with breakfast. 90 tablet 1    pravastatin (Pravachol) 20 mg tablet Take 1 tablet (20 mg) by mouth once daily at bedtime. 90 tablet 0    sertraline (Zoloft) 100 mg tablet Take 1 tablet (100 mg) by mouth once daily. 90 tablet 1    tamsulosin (Flomax) 0.4 mg 24 hr capsule Take 2 capsules (0.8 mg) by mouth once daily. 90 capsule 3    valsartan (Diovan) 160 mg tablet Take 1 tablet (160 mg) by mouth 2 times a day. 180 tablet 0    warfarin (Coumadin) 5 mg tablet Take 7.5 mg Monday- Friday and 5 mg all other days or as directed to maintain therapeutic INR as directed per physician. 110 tablet 0    relugolix (Orgovyx) 120 mg tablet Take 3 tablets by mouth on day 1 and then 1 tablet everyday onward. (Patient not taking: Reported on 2/3/2025) 30 tablet 2     No current facility-administered medications for this visit.       Immunization History   Administered Date(s) Administered    COVID-19, mRNA, LNP-S, PF, 30 mcg/0.3 mL dose 02/10/2021, 03/02/2021, 10/13/2021    Flu vaccine (IIV4), preservative free *Check age/dose* 10/08/2020    Flu vaccine, quadrivalent, high-dose, preservative free, age 65y+ (FLUZONE) 10/03/2023    Flu vaccine, trivalent, preservative free, HIGH-DOSE, age 65y+ (Fluzone) 11/30/2017, 10/16/2018, 10/17/2019, 11/05/2024    Influenza, Seasonal, Quadrivalent, Adjuvanted 11/22/2021, 11/07/2022    Influenza, Unspecified 10/01/2010, 01/01/2014, 02/01/2015, 10/01/2018, 11/01/2022    Influenza, live, intranasal, quadrivalent 11/22/2021    Influenza, seasonal, injectable 01/15/2014,  11/28/2018, 08/01/2019, 10/01/2020, 09/13/2021, 09/19/2022    Pfizer COVID-19 vaccine, 12 years and older, (30mcg/0.3mL) (Comirnaty) 01/26/2024    Pfizer Gray Cap SARS-CoV-2 05/05/2022    Pfizer Purple Cap SARS-CoV-2 02/07/2022    Pneumococcal Conjugate PCV 7 1945    Pneumococcal conjugate vaccine, 13-valent (PREVNAR 13) 03/03/2017    Pneumococcal polysaccharide vaccine, 23-valent, age 2 years and older (PNEUMOVAX 23) 02/10/2014, 07/30/2019, 08/16/2021    Tdap vaccine, age 7 year and older (BOOSTRIX, ADACEL) 07/15/2024    Zoster vaccine, recombinant, adult (SHINGRIX) 06/19/2019, 08/19/2019    Zoster, Unspecified 07/01/2019, 08/31/2019        Review of Systems   Constitutional: Negative.    HENT: Negative.     Eyes: Negative.    Respiratory: Negative.     Cardiovascular: Negative.    Gastrointestinal: Negative.    Endocrine: Negative.    Genitourinary: Negative.    Musculoskeletal: Negative.    Skin: Negative.    Allergic/Immunologic: Negative.    Hematological: Negative.    Psychiatric/Behavioral: Negative.     All other systems reviewed and are negative.       Vitals:    02/03/25 1021   BP: 163/78   Pulse: 73   Temp: 36.5 °C (97.7 °F)     Vitals:    02/03/25 1021   Weight: 97.3 kg (214 lb 9.6 oz)      Physical Exam  Constitutional:       General: He is not in acute distress.     Appearance: Normal appearance.   Neurological:      Mental Status: He is alert.          ASSESSMENT/PLAN: Prostate cancer.  The patient and I discussed in detail the risks and benefits of watchful waiting, radiation therapy, prostatectomy, and radiation seed implant.  We discussed the prostatectomy may be complicated because of his age and comorbid conditions.  I did recommend that the patient consider radiation therapy as outlined by radiation oncology.  This would involve 5 treatments.  We discussed potential risks associated with this especially chronic fatigue proctitis with rectal bleeding and sexual dysfunction.  The patient is  still not sure if he would like to proceed with this or begin a period of watchful waiting.  He will discuss this further with his wife.  He will follow-up with urology regarding this as well.    Continue current meds  Follow-up in 6 weeks and call as needed       Scribe Attestation  By signing my name below, I, Juliann Tamayo LPN, Scribe   attest that this documentation has been prepared under the direction and in the presence of Juan West MD.

## 2025-02-04 ASSESSMENT — ENCOUNTER SYMPTOMS
MUSCULOSKELETAL NEGATIVE: 1
ALLERGIC/IMMUNOLOGIC NEGATIVE: 1
HEMATOLOGIC/LYMPHATIC NEGATIVE: 1
RESPIRATORY NEGATIVE: 1
CARDIOVASCULAR NEGATIVE: 1
EYES NEGATIVE: 1
GASTROINTESTINAL NEGATIVE: 1
CONSTITUTIONAL NEGATIVE: 1
ENDOCRINE NEGATIVE: 1
PSYCHIATRIC NEGATIVE: 1

## 2025-02-19 ENCOUNTER — ANTICOAGULATION - WARFARIN VISIT (OUTPATIENT)
Dept: CARDIOLOGY | Facility: CLINIC | Age: 80
End: 2025-02-19
Payer: MEDICARE

## 2025-02-19 LAB
INR PPP: 2
PROTHROMBIN TIME: 20.3 SEC (ref 9–11.5)

## 2025-02-19 NOTE — PROGRESS NOTES
Called and spoke with Mando Tay regarding INR 2.0 with goal 2.5-3.5.  Denies any changes to diet or medications.      Change current Coumadin dose to For today only 2/19/2025 Coumadin 10 mg then starting Sunday 2/23/2025 increase Coumadin 7.5 mg to 4 days per week Sun-Tue-thur-Sat along with  Coumadin 5 mg all other days.     Repeat INR 2 weeks, approximately  3/5/2025.    Mando verbalizes understanding.

## 2025-02-20 ENCOUNTER — APPOINTMENT (OUTPATIENT)
Dept: CARDIOLOGY | Facility: CLINIC | Age: 80
End: 2025-02-20
Payer: COMMERCIAL

## 2025-02-20 ENCOUNTER — HOSPITAL ENCOUNTER (OUTPATIENT)
Dept: CARDIOLOGY | Age: 80
Discharge: HOME | End: 2025-02-20
Payer: MEDICARE

## 2025-02-20 VITALS
SYSTOLIC BLOOD PRESSURE: 156 MMHG | HEART RATE: 87 BPM | HEIGHT: 68 IN | BODY MASS INDEX: 32.46 KG/M2 | DIASTOLIC BLOOD PRESSURE: 80 MMHG | WEIGHT: 214.2 LBS

## 2025-02-20 DIAGNOSIS — I05.9 MITRAL VALVE DISEASE: ICD-10-CM

## 2025-02-20 DIAGNOSIS — I48.0 PAROXYSMAL ATRIAL FIBRILLATION (MULTI): ICD-10-CM

## 2025-02-20 DIAGNOSIS — I10 PRIMARY HYPERTENSION: ICD-10-CM

## 2025-02-20 PROCEDURE — 1123F ACP DISCUSS/DSCN MKR DOCD: CPT | Performed by: INTERNAL MEDICINE

## 2025-02-20 PROCEDURE — 3079F DIAST BP 80-89 MM HG: CPT | Performed by: INTERNAL MEDICINE

## 2025-02-20 PROCEDURE — 3077F SYST BP >= 140 MM HG: CPT | Performed by: INTERNAL MEDICINE

## 2025-02-20 PROCEDURE — 93296 REM INTERROG EVL PM/IDS: CPT

## 2025-02-20 PROCEDURE — 1159F MED LIST DOCD IN RCRD: CPT | Performed by: INTERNAL MEDICINE

## 2025-02-20 PROCEDURE — 99214 OFFICE O/P EST MOD 30 MIN: CPT | Performed by: INTERNAL MEDICINE

## 2025-02-20 PROCEDURE — G2211 COMPLEX E/M VISIT ADD ON: HCPCS | Performed by: INTERNAL MEDICINE

## 2025-02-20 RX ORDER — CARVEDILOL 25 MG/1
37.5 TABLET ORAL 2 TIMES DAILY
Qty: 180 TABLET | Refills: 3 | Status: SHIPPED | OUTPATIENT
Start: 2025-02-20

## 2025-02-20 RX ORDER — PRAVASTATIN SODIUM 20 MG/1
20 TABLET ORAL NIGHTLY
Qty: 90 TABLET | Refills: 3 | Status: SHIPPED | OUTPATIENT
Start: 2025-02-20

## 2025-02-20 RX ORDER — FUROSEMIDE 20 MG/1
20 TABLET ORAL DAILY
Qty: 90 TABLET | Refills: 3 | Status: SHIPPED | OUTPATIENT
Start: 2025-02-20

## 2025-02-20 RX ORDER — DILTIAZEM HYDROCHLORIDE 180 MG/1
180 CAPSULE, COATED, EXTENDED RELEASE ORAL DAILY
Qty: 90 CAPSULE | Refills: 3 | Status: SHIPPED | OUTPATIENT
Start: 2025-02-20 | End: 2026-02-20

## 2025-02-20 RX ORDER — VALSARTAN 160 MG/1
160 TABLET ORAL 2 TIMES DAILY
Qty: 180 TABLET | Refills: 3 | Status: SHIPPED | OUTPATIENT
Start: 2025-02-20

## 2025-02-20 RX ORDER — EZETIMIBE 10 MG/1
10 TABLET ORAL DAILY
Qty: 90 TABLET | Refills: 3 | Status: SHIPPED | OUTPATIENT
Start: 2025-02-20

## 2025-02-20 RX ORDER — ISOSORBIDE MONONITRATE 30 MG/1
30 TABLET, EXTENDED RELEASE ORAL DAILY
Qty: 90 TABLET | Refills: 3 | Status: SHIPPED | OUTPATIENT
Start: 2025-02-20

## 2025-02-20 NOTE — PATIENT INSTRUCTIONS
INCREASE CARVEDILOL TO 37.5MG 1 AND A HALF TABLETS TWICE A DAY  Fasting labs to be done in 1-2 weeks from today 02/20/25  (Midnight the night before) Water is okay      FOLLOW UP IN 3 MONTHS     EAT A CONSISTENT DIET OF ALL VEGETABLES.  ATE THE SAME AMOUNT EACH WEEK.  HAVING A CONSISTENT DIET WILL KEEP YOUR INRs STABLE    AVOID PROCESSED FOODS, THEY USUALLY ARE HIGH IN SODIUM.  SODIUM IS BAD FOR BLOOD PRESSURE.  EAT FRESH OR FROZEN VEGETABLES AND FRESH MEATS.  AVOID SAUSAGES, HOT DOGS AND LUNCH MEATS.     Please bring all medicines, vitamins, and herbal supplements with you in original bottles to every appointment! This is the best way to ensure your medication list in your chart is accurate.    Prescriptions will not be filled unless you are compliant with your follow up appointments or have a follow up appointment scheduled as per instruction of your physician. Refills should be requested at the time of your visit.      DID YOU KNOW  We have a pharmacy here in the [facility].  They can fill all prescriptions, not just cardiac medications.  Prescriptions from other pharmacies can easily be transferred to the  pharmacy by the  pharmacist on site.   pharmacies offer FREE HOME DELIVERY on medications to anywhere in [place]. They can sync your medications. Typically prescriptions can be ready in 10 - 15 minutes. If pharmacy is unable to fill your  prescription or if cost is more than your paying now the Pharmacist can easily transfer back to your Pharmacy of choice. Pharmacy phone # [phone].

## 2025-02-20 NOTE — PROGRESS NOTES
Patient:  Mando Tay  YOB: 1945  MRN: [id number]       Impression/Plan:     Diagnoses and all orders for this visit:  Primary hypertension  -     Suboptimal control therefore increase carvedilol to 37.5 twice daily  -     carvedilol (Coreg) 25 mg tablet; Take 1.5 tablets (37.5 mg) by mouth 2 times a day.  -     dilTIAZem CD (Cardizem CD) 180 mg 24 hr capsule; Take 1 capsule (180 mg) by mouth once daily.  -     pravastatin (Pravachol) 20 mg tablet; Take 1 tablet (20 mg) by mouth once daily at bedtime.  -     valsartan (Diovan) 160 mg tablet; Take 1 tablet (160 mg) by mouth 2 times a day.  -     CBC; Future  -     Comprehensive Metabolic Panel; Future  -     Lipid Panel; Future  Paroxysmal atrial fibrillation (Multi)  -     Low burden to date  -     carvedilol (Coreg) 25 mg tablet; Take 1.5 tablets (37.5 mg) by mouth 2 times a day.  -     dilTIAZem CD (Cardizem CD) 180 mg 24 hr capsule; Take 1 capsule (180 mg) by mouth once daily.    Mitral valve disease  -     Clinically continues to function well        Chief Complaint/Active Symptoms:       Mando Tay is a 79 y.o. male who presents with  paroxysmal atrial flutter, previous mechanical mitral valve replacement for mitral valve prolapse and endocarditis in 1998, sick sinus syndrome with permanent pacemaker.  Coronary angiography at that time with trivial irregularities circumflex only.  He has longstanding hypertension and diabetes.  From medical standpoint also with history of restrictive lung disease, T-cell lymphoma diagnosed in 2015.  Does have moderately severe restrictive lung disease     FALL 2022 he had symptoms of shortness of breath and CHF felt related to atrial flutter. He was put on flecainide cardioverted initially felt better but then further deteriorated had episodes of pneumonia some unexplained shortness of breath with eventually was felt related to flecainide.. He felt better off the flecainide which was discontinued in April.  There is a 5-10% incidence of sensation of dyspnea associated with flecainide. Then became ill again with orchiditis. Subsequent CT scan demonstrated a right renal cyst and a left renal mass. He has since seen urology who feels it very likely could be malignancy but because of its size metastatic progression relatively unlikely and and serial imaging is planned.      Seen in pulmonary medicine office 10/17/2023 at which time the moderately severe restrictive lung disease described.  Recommended he follow-up with cardiology for control of A-fib and hypertension though there was no evidence of A-fib at that office visit but blood pressure was about 150 systolic at that time.       4/23/2024 underwent left renal mass biopsy and cryoablation no cardiac complications described.      9/27/2024 direct-current cardioversion after MARTINA demonstrated normal valve and LV function and no thrombus. Tiny PFO identified.     Office visit 10/17/2024 remained in sinus rhythm with appropriate pacing.  Has not had evidence of coronary disease trivial irregularities of cath and unremarkable perfusion studies recently.  Mitral valve was functioning well at that time he was going to have biopsy so was bridged from his warfarin.  Shortness of breath felt pulmonary in etiology.    He has since been diagnosed with prostate carcinoma and will be having radiation therapy.    INR 2.0 as of 2/18/2025 October 2024 CMP with creatinine 1.33 about at baseline.  Normal electrolytes normal liver function.  No recent CBC    Has pacemaker appoint with Dr. Fair next month.    He denies angina, dyspnea, palpitation, edema, lightheadedness or syncope.  He has had no symptoms of claudication or neurologic deterioration.  There have been no hospitalizations or emergency room visits since last office visit.    He is elected to pursue an expectant approach to his prostate carcinoma.  He is in a 10% risk group and would prefer not to undergo interventions at  this point as he has more concerns about side effects of prostate intervention as opposed to metastatic prostate carcinoma given his overall risk stratification.    He has not had any angina he does not believe he has had significant episodes of atrial fibrillation.  He does note that his blood pressure has been a bit elevated he does have some processed foods in his diet.  He has had no bleeding on anticoagulation although his INR has been somewhat fluctuating.  He has chronic mild dyspnea related to his pulmonary disease but has not had evidence of volume overload of late.  He is comfortable at his current level of activity               Review of Systems: Unremarkable except as noted above    Meds     Current Outpatient Medications   Medication Instructions    betamethasone valerate (Valisone) 0.1 % cream Topical, 2 times daily    calcium carbonate-vitamin D3 (Oyster Shell) 250 mg-3.125 mcg (125 unit) tablet 1 tablet, 2 times daily (morning and late afternoon)    carvedilol (COREG) 25 mg, oral, 2 times daily    cholecalciferol (Vitamin D-3) 25 MCG (1000 UT) tablet 1 tablet, Daily RT    cyanocobalamin (VITAMIN B-12) 2,000 mcg, oral, Daily    dilTIAZem CD (CARDIZEM CD) 180 mg, oral, Daily    empagliflozin (Jardiance) 25 mg 0.5 tablets, Daily    ezetimibe (ZETIA) 10 mg, oral, Daily    fluticasone (Flonase) 50 mcg/actuation nasal spray 1 spray, Each Nostril, Daily, Shake gently. Before first use, prime pump. After use, clean tip and replace cap.    furosemide (LASIX) 20 mg, oral, Daily    isosorbide mononitrate ER (IMDUR) 30 mg, oral, Daily    metFORMIN (GLUCOPHAGE) 500 mg, oral, Daily with breakfast    pravastatin (PRAVACHOL) 20 mg, oral, Nightly    sertraline (ZOLOFT) 100 mg, oral, Daily    tamsulosin (FLOMAX) 0.8 mg, oral, Daily    valsartan (DIOVAN) 160 mg, oral, 2 times daily    warfarin (Coumadin) 5 mg tablet Take 7.5 mg Monday- Friday and 5 mg all other days or as directed to maintain therapeutic INR as  directed per physician.        Allergies     Allergies   Allergen Reactions    Atorvastatin Myalgia    Flecainide Unknown    Rosuvastatin Myalgia    Statins-Hmg-Coa Reductase Inhibitors Unknown     Statins do not work    Sulfa (Sulfonamide Antibiotics) Unknown    Procainamide Rash         Annotated Problems     Specialty Problems          Cardiology Problems    History of mitral valve replacement     1998 [employer] mechanical mitral valve replacement         Presence of cardiac pacemaker      2/18/15 generator change to [employer] Adapta L       10/04 Generator change to Medtronic [place] DR [id number]      1998 first device (pacesetter) placed due to CHB after MVR surgery.         Mitral valve disease     · 12/7/2021 echo LVEF 60%. LVH. RVSP 44 mm. 2+ AI unchanged. Normal function      mechanical mitral valve       History of mitral valve prolapse with mitral valve replacement mechanical [employer]        valve 1998 secondary to severe MR and endocarditis   History of mitral valve prolapse with mitral valve replacement mechanical [employer]      valve 1998 secondary to severe MR and endocarditis      Late 1997 MI- Felt due to emboli from mitral valve vegetation.         Primary hypertension    Atypical atrial flutter (Multi)    Anticoagulant long-term use    Hyperlipidemia      · 1997 trivial coronary irreg in cx. normal LV, severe MR w/subsequent MVR.          Paroxysmal atrial fibrillation (Multi)    Paroxysmal atrial flutter (Multi)    Sick sinus syndrome (Multi)    Chronic diastolic congestive heart failure, NYHA class 2    Never smoked cigarettes    Ascending aortic aneurysm (CMS-HCC)     CT scan 9/27/2024: Ascending aorta 42 mm         Coronary artery disease involving native coronary artery of native heart without angina pectoris        Problem List     Patient Active Problem List    Diagnosis Date Noted    Malignant neoplasm of prostate (Multi) 01/24/2025    Coronary artery disease involving native coronary  artery of native heart without angina pectoris 10/01/2024    Ascending aortic aneurysm (CMS-HCC) 10/01/2024    Medicare annual wellness visit, subsequent 06/06/2024    Never smoked cigarettes 06/06/2024    Pre-operative clearance 04/04/2024    Chronic diastolic congestive heart failure, NYHA class 2 10/30/2023    Medication dose changed 10/30/2023    Adrenal nodule 10/01/2023    Renal mass, left 10/01/2023    Arthritis 10/01/2023    Atypical depressive disorder 10/01/2023    Bilateral dry eyes 10/01/2023    BPPV (benign paroxysmal positional vertigo) 10/01/2023    Combined form of age-related cataract, both eyes 10/01/2023    Cyst of testis 10/01/2023    Dysphagia 10/01/2023    Edema 10/01/2023    Fatigue 10/01/2023    Hydrocele, bilateral 10/01/2023    Hyperlipidemia 10/01/2023    Hyperopia with presbyopia 10/01/2023    MGD (meibomian gland disease) 10/01/2023    Ocular migraine 10/01/2023    Orchitis and epididymitis 10/01/2023    Paroxysmal atrial fibrillation (Multi) 10/01/2023    Retinal scar of right eye 10/01/2023    Shortness of breath 10/01/2023    Sick sinus syndrome (Multi) 10/01/2023    Superficial basal cell carcinoma of skin of left shoulder 10/01/2023    Vestibulopathy 10/01/2023    Vitamin B 12 deficiency 10/01/2023    Vitamin D deficiency 10/01/2023    Hypoxia 10/01/2023    Anticoagulant long-term use 10/01/2023    Bilateral sensorineural hearing loss 10/01/2023    Chronic nasal congestion 10/01/2023    Class 1 obesity with body mass index (BMI) of 31.0 to 31.9 in adult 10/01/2023    Degenerative cervical spinal stenosis 10/01/2023    Diabetes mellitus treated with oral medication (Multi) 10/01/2023    High risk medication use 10/01/2023    History of snoring 10/01/2023    Hypertrophy of inferior nasal turbinate 10/01/2023    Paroxysmal atrial flutter (Multi) 10/01/2023    Tinnitus 10/01/2023    Agent orange exposure 08/21/2023    Atypical atrial flutter (Multi) 08/21/2023    BPH with  "obstruction/lower urinary tract symptoms 08/21/2023    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    Erectile dysfunction 08/21/2023    Major depressive disorder with single episode, in full remission (CMS-HCC) 08/21/2023    Acute bronchitis due to other specified organisms 08/21/2023    Hemangioma of skin and subcutaneous tissue 06/20/2023    Actinic keratosis 06/20/2023    Other seborrheic keratosis 06/20/2023    Melanocytic nevi of scalp and neck 06/20/2023    Melanocytic nevi of trunk 06/20/2023    Melanocytic nevi of unspecified lower limb, including hip 06/20/2023    Melanocytic nevi of unspecified part of face 06/20/2023    Melanocytic nevi of unspecified upper limb, including shoulder 06/20/2023    Neoplasm of uncertain behavior of skin 06/20/2023    Other hypertrophic disorders of the skin 06/20/2023    Other melanin hyperpigmentation 06/20/2023    Personal history of other malignant neoplasm of skin 06/20/2023    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    Viral wart, unspecified 06/20/2023    Lumbar stenosis with neurogenic claudication 03/17/2020    Myalgia, unspecified site 09/03/2019    Radial styloid tenosynovitis 06/13/2019    RAMSES (obstructive sleep apnea) 02/27/2018    Primary hypertension 02/27/2018    Mitral valve disease 02/27/2018    Presence of cardiac pacemaker 01/24/2018    History of mitral valve replacement 01/24/2018    Cervical spondylosis without myelopathy 02/02/2016    Chondromalacia of patella 01/19/2016    Primary localized osteoarthrosis of shoulder region 01/19/2016    Adhesive capsulitis of shoulder 01/19/2016       Objective:     Vitals:    02/20/25 1224   BP: 152/80   BP Location: Left arm   Patient Position: Sitting   Pulse: 87   Weight: 97.2 kg (214 lb 3.2 oz)   Height: 1.727 m (5' 8\")      Wt Readings from Last 4 Encounters:   02/20/25 97.2 kg (214 lb 3.2 oz)   02/03/25 97.3 kg (214 lb 9.6 oz)   01/24/25 97.4 kg (214 lb 11.7 oz)   11/21/24 96.3 kg (212 lb 6.4 oz) "           LAB:     Lab Results   Component Value Date    WBC 5.5 04/10/2024    HGB 13.6 04/10/2024    HCT 41.2 04/10/2024     (L) 04/10/2024    CHOL 157 06/27/2024    TRIG 144 06/27/2024    HDL 42.7 06/27/2024    ALT 21 10/10/2024    AST 21 10/10/2024     10/10/2024    K 4.2 10/10/2024     10/10/2024    CREATININE 1.33 (H) 10/10/2024    BUN 27 (H) 10/10/2024    CO2 29 10/10/2024    TSH 1.34 04/04/2022    PSA 8.17 (H) 08/29/2024    INR 2.0 (H) 02/18/2025    HGBA1C 7.0 (H) 10/10/2024       Diagnostic Studies:     No results found.      Radiology:     No orders to display       Physical Exam     General Appearance: alert and oriented to person, place and time, in no acute distress  Cardiovascular: normal rate, regular rhythm, normal S1 and S2, no murmurs, rubs, clicks, or gallops,  no JVD crisp mechanical mitral valve sounds  Pulmonary/Chest: clear to auscultation bilaterally- no wheezes, rales or rhonchi, normal air movement, no respiratory distress  Abdomen: soft, non-tender, non-distended, normal bowel sounds, no masses   Extremities: no cyanosis, clubbing or edema  Skin: warm and dry, no rash or erythema  Eyes: EOMI  Neck: supple and non-tender without mass, no thyromegaly   Neurological: alert, oriented, normal speech, no focal findings or movement disorder noted

## 2025-02-26 ENCOUNTER — APPOINTMENT (OUTPATIENT)
Dept: RADIOLOGY | Facility: CLINIC | Age: 80
End: 2025-02-26
Payer: MEDICARE

## 2025-02-27 DIAGNOSIS — C61 MALIGNANT NEOPLASM OF PROSTATE (MULTI): Primary | ICD-10-CM

## 2025-03-04 NOTE — TELEPHONE ENCOUNTER
"I pulled up his chart to touch base with him this month and noticed the PET CT was cancelled stating: \"Patient: Canceled via MyChart (Not pursuing any further treatment, Dr. Joshua has been notified\".    "

## 2025-03-05 ENCOUNTER — TELEPHONE (OUTPATIENT)
Dept: CARDIOLOGY | Facility: CLINIC | Age: 80
End: 2025-03-05
Payer: MEDICARE

## 2025-03-05 ENCOUNTER — ANTICOAGULATION - WARFARIN VISIT (OUTPATIENT)
Dept: CARDIOLOGY | Facility: CLINIC | Age: 80
End: 2025-03-05
Payer: MEDICARE

## 2025-03-05 LAB
INR PPP: 4.4
PROTHROMBIN TIME: 41.3 SEC (ref 9–11.5)

## 2025-03-05 NOTE — TELEPHONE ENCOUNTER
----- Message from Shirley Di sent at 3/5/2025  8:21 AM EST -----  Please let him know that his INR has jumped from 2.0 to 4.4  INR Goal 2.5 - 3.5.  I made a change in coumadin with last low INR but did not expect this significant increase.  Please make sure no medication or diet changes.       For today only 3/5/2025 HOLD Coumadin, Then starting tomorrow 3/6/2025 Change Coumadin  dosing to 7.5 mg to 3 days per week Mon- Thur-Sat along with  Coumadin 5 mg all other days.     Repeat INR 1 week, approximately  3/12/2025.

## 2025-03-05 NOTE — PROGRESS NOTES
INR 4.4  INR Goal 2.5 - 3.5     For today only 3/5/2025 HOLD Coumadin, Then starting tomorrow 3/6/2025 Change Coumadin  dosing to 7.5 mg to 3 days per week Mon- Thur-Sat along with  Coumadin 5 mg all other days.     Repeat INR 1 weeks, approximately  3/12/2025.    Forwarded to [place] [facility] to notify patient in my absence.

## 2025-03-06 ENCOUNTER — TELEPHONE (OUTPATIENT)
Dept: CARDIOLOGY | Facility: CLINIC | Age: 80
End: 2025-03-06
Payer: MEDICARE

## 2025-03-12 LAB
INR PPP: 4.3
PROTHROMBIN TIME: 40.4 SEC (ref 9–11.5)

## 2025-03-13 ENCOUNTER — ANTICOAGULATION - WARFARIN VISIT (OUTPATIENT)
Dept: CARDIOLOGY | Facility: CLINIC | Age: 80
End: 2025-03-13
Payer: MEDICARE

## 2025-03-13 LAB
ALBUMIN SERPL-MCNC: 4.2 G/DL (ref 3.6–5.1)
ALBUMIN/CREAT UR: 343 MG/G CREAT
ALP SERPL-CCNC: 62 U/L (ref 35–144)
ALT SERPL-CCNC: 16 U/L (ref 9–46)
ANION GAP SERPL CALCULATED.4IONS-SCNC: 7 MMOL/L (CALC) (ref 7–17)
AST SERPL-CCNC: 20 U/L (ref 10–35)
BILIRUB SERPL-MCNC: 0.7 MG/DL (ref 0.2–1.2)
BUN SERPL-MCNC: 23 MG/DL (ref 7–25)
CALCIUM SERPL-MCNC: 9.2 MG/DL (ref 8.6–10.3)
CHLORIDE SERPL-SCNC: 102 MMOL/L (ref 98–110)
CO2 SERPL-SCNC: 32 MMOL/L (ref 20–32)
CREAT SERPL-MCNC: 1.16 MG/DL (ref 0.7–1.28)
CREAT UR-MCNC: 69 MG/DL (ref 20–320)
EGFRCR SERPLBLD CKD-EPI 2021: 64 ML/MIN/1.73M2
EST. AVERAGE GLUCOSE BLD GHB EST-MCNC: 171 MG/DL
EST. AVERAGE GLUCOSE BLD GHB EST-SCNC: 9.5 MMOL/L
GLUCOSE SERPL-MCNC: 133 MG/DL (ref 65–99)
HBA1C MFR BLD: 7.6 % OF TOTAL HGB
MICROALBUMIN UR-MCNC: 23.7 MG/DL
POTASSIUM SERPL-SCNC: 4.3 MMOL/L (ref 3.5–5.3)
PROT SERPL-MCNC: 7 G/DL (ref 6.1–8.1)
SODIUM SERPL-SCNC: 141 MMOL/L (ref 135–146)

## 2025-03-13 NOTE — PROGRESS NOTES
Called and spoke with Mando Tay regarding INR 4.3  INR Goal 2.5 - 3.5     For today only 3/13/2025 HOLD Coumadin, Then starting tomorrow 3/14/2025 Change Coumadin  dosing to 7.5 mg 2 days per week Mon and Fri along with  Coumadin 5 mg all other days.     Repeat INR 2 weeks, approximately  3/26/2025.    Mando verbalizes understanding.

## 2025-03-24 ENCOUNTER — APPOINTMENT (OUTPATIENT)
Dept: CARDIOLOGY | Facility: CLINIC | Age: 80
End: 2025-03-24
Payer: MEDICARE

## 2025-03-24 VITALS
HEART RATE: 75 BPM | WEIGHT: 211 LBS | DIASTOLIC BLOOD PRESSURE: 68 MMHG | SYSTOLIC BLOOD PRESSURE: 138 MMHG | BODY MASS INDEX: 32.08 KG/M2

## 2025-03-24 DIAGNOSIS — I25.10 CORONARY ARTERY DISEASE INVOLVING NATIVE CORONARY ARTERY OF NATIVE HEART WITHOUT ANGINA PECTORIS: ICD-10-CM

## 2025-03-24 DIAGNOSIS — I49.5 SICK SINUS SYNDROME (MULTI): ICD-10-CM

## 2025-03-24 DIAGNOSIS — Z95.0 PRESENCE OF CARDIAC PACEMAKER: ICD-10-CM

## 2025-03-24 DIAGNOSIS — I49.1 PAC (PREMATURE ATRIAL CONTRACTION): ICD-10-CM

## 2025-03-24 DIAGNOSIS — I48.0 PAROXYSMAL ATRIAL FIBRILLATION (MULTI): ICD-10-CM

## 2025-03-24 DIAGNOSIS — Z78.9 NEVER SMOKED CIGARETTES: ICD-10-CM

## 2025-03-24 DIAGNOSIS — Z79.899 HIGH RISK MEDICATION USE: Primary | ICD-10-CM

## 2025-03-24 DIAGNOSIS — E78.5 HYPERLIPIDEMIA, UNSPECIFIED HYPERLIPIDEMIA TYPE: ICD-10-CM

## 2025-03-24 DIAGNOSIS — I50.32 CHRONIC DIASTOLIC CONGESTIVE HEART FAILURE, NYHA CLASS 2: ICD-10-CM

## 2025-03-24 PROCEDURE — 3078F DIAST BP <80 MM HG: CPT | Performed by: INTERNAL MEDICINE

## 2025-03-24 PROCEDURE — 99214 OFFICE O/P EST MOD 30 MIN: CPT | Performed by: INTERNAL MEDICINE

## 2025-03-24 PROCEDURE — 1159F MED LIST DOCD IN RCRD: CPT | Performed by: INTERNAL MEDICINE

## 2025-03-24 PROCEDURE — 1123F ACP DISCUSS/DSCN MKR DOCD: CPT | Performed by: INTERNAL MEDICINE

## 2025-03-24 PROCEDURE — 1036F TOBACCO NON-USER: CPT | Performed by: INTERNAL MEDICINE

## 2025-03-24 PROCEDURE — 3075F SYST BP GE 130 - 139MM HG: CPT | Performed by: INTERNAL MEDICINE

## 2025-03-24 NOTE — PROGRESS NOTES
CARDIOLOGY OFFICE VISIT      CHIEF COMPLAINT  Chief Complaint   Patient presents with    Follow-up       HISTORY OF PRESENT ILLNESS  HPI  79-year-old male with a past medical history of paroxysmal atrial flutter with previous mechanical mitral valve replacement for mitral valve prolapse and endocarditis in 1998, sinus node dysfunction status post permanent pacemaker implantation.  He coronary angiogram at that time showed trivial irregularities in the circumflex only.  Has a history of hypertension diabetes mellitus.  He also has restrictive lung disease T-cell lymphoma diagnosed in 2015.     Back in 2022 he had episodes of shortness of breath that he felt that it was related with atrial flutter.  He was placed on flecainide but he got episodes of shortness of breath that this medication was discontinued.  Symptoms improved.     Patient had the diagnosis of restrictive lung disease in 2023 and followed by pulmonary service.       4/23/2024 underwent left renal mass biopsy and cryoablation no cardiac complications described     Pathology demonstrated oncocytic renal neoplasm  5/13/2024 INR 3.1 CMP normal except glucose 119   CBC/10/24 normal platelet 142.         Echocardiogram May 2024     CONCLUSIONS:      1. Left ventricular systolic function is normal with a 65% estimated ejection fraction.   2. Spectral Doppler shows an impaired relaxation pattern of left ventricular diastolic filling.   3. There is moderate concentric left ventricular hypertrophy.   4. Mild left atrial enlargement.   5. Moderate aortic valve regurgitation.   6. Normal functioning [facility] MVR.   7. Right-sided pacemaker lead noted.   8. No comparison study available.      Patient was seen in my office in August 2024.  At time he had a device interrogation that shows that he was in persistent atrial fibrillation.    A MARTINA was performed September 2024 prior to cardioversion.  CONCLUSIONS:   1. The left ventricular systolic function is normal,  with a visually estimated ejection fraction of 55%.   2. Spectral Doppler shows an impaired relaxation pattern of left ventricular diastolic filling.   3. There is normal right ventricular global systolic function.   4. The left atrium is moderate to severely dilated.   5. Bioprosthetic mitral valve mean gradient of 13 mmHg, very small perivalvular leak, clinically insignificant.   6. Mild aortic valve regurgitation.   7. No left atrial mass.   8. No left atrial thrombus.   9. A bubble study using agitated saline was performed. Bubble study is positive. A small PFO (= 10 bubbles) was demonstrated.     Successful cardioversion  was performed September 2004 with no recurrence of atrial fibrillation.    Patient had an a stress test in September 2024   IMPRESSION:  Normal Lexiscan Myoview cardiac perfusion stress test.  No evidence of ischemia or myocardial infarction by perfusion imaging.  Normal left ventricular systolic function, ejection fraction 64%.  Noninvasive risk stratification: Low risk.  Comparison study dated February 17, 2017 was negative for ischemia or  infarction. Calculated LV ejection fraction 56%.      Since the cardioversion, he has been doing well.  He denies any symptoms of chest pain or shortness breath or palpitations.    Patient was recently diagnosed with prostate cancer.  He is in observation.    Device interrogation in February 2025 shows dual-chamber pacemaker Medtronic with battery longevity 3.5 years.  No evidence of atrial fibrillation.        Past Medical History  Past Medical History:   Diagnosis Date    Acute myocardial infarction, unspecified 11/17/2021    Acute myocardial infarction    Arrhythmia     Asthma     Cutaneous T-cell lymphoma (Multi)     Depression     Diabetes mellitus (Multi)     Epididymitis 06/02/2022    Epididymitis, right    Essential (primary) hypertension 12/08/2022    Hypertension    Heart disease     Hyperlipidemia     Kidney tumor     Obesity, unspecified  04/04/2022    Class 1 obesity with body mass index (BMI) of 31.0 to 31.9 in adult    Obesity, unspecified 06/08/2022    Class 1 obesity with body mass index (BMI) of 30.0 to 30.9 in adult    Obesity, unspecified 01/10/2022    Class 1 obesity with body mass index (BMI) of 31.0 to 31.9 in adult    Obesity, unspecified 01/31/2022    Class 1 obesity with body mass index (BMI) of 31.0 to 31.9 in adult    Other abnormal findings in urine 05/09/2022    Dark urine    Other specified disorders of kidney and ureter     Bilateral renal masses    Other specified disorders of kidney and ureter 05/09/2022    Renal mass, right    Other specified disorders of the male genital organs 05/09/2022    Swelling of right half of scrotum    Other symptoms and signs involving the musculoskeletal system 11/17/2021    Leg fatigue    Personal history of other diseases of the circulatory system 11/17/2021    History of mitral valve insufficiency    Personal history of other diseases of the respiratory system 12/09/2021    History of acute bronchitis    Personal history of other specified conditions 04/04/2022    History of shortness of breath    Personal history of other specified conditions     History of snoring    Personal history of pneumonia (recurrent) 01/31/2022    History of pneumonia    Prediabetes 06/18/2018    Borderline diabetes mellitus    Scrotal pain 05/09/2022    Acute pain in scrotum    Shortness of breath 12/09/2022    Shortness of breath on exertion    Sleep apnea     cpap    Unspecified cataract 02/22/2017    Cataract of right eye    Unspecified cataract 02/22/2017    Cataract of left eye    Unspecified osteoarthritis, unspecified site     Arthritis       Social History  Social History     Tobacco Use    Smoking status: Never     Passive exposure: Past    Smokeless tobacco: Never   Vaping Use    Vaping status: Never Used   Substance Use Topics    Alcohol use: Not Currently     Comment: not for years    Drug use: Never        Family History     Family History   Problem Relation Name Age of Onset    Other (Cardiac disorder) Mother      Cataracts Mother      Hypertension Mother      Other (Cardiac disorder) Father      Stomach cancer Father      Hypertension Father          Allergies:  Allergies   Allergen Reactions    Atorvastatin Myalgia    Flecainide Unknown    Rosuvastatin Myalgia    Statins-Hmg-Coa Reductase Inhibitors Unknown     Statins do not work    Sulfa (Sulfonamide Antibiotics) Unknown    Procainamide Rash        Outpatient Medications:  Current Outpatient Medications   Medication Instructions    betamethasone valerate (Valisone) 0.1 % cream Topical, 2 times daily    calcium carbonate-vitamin D3 (Oyster Shell) 250 mg-3.125 mcg (125 unit) tablet 1 tablet, 2 times daily (morning and late afternoon)    carvedilol (COREG) 37.5 mg, oral, 2 times daily    cholecalciferol (Vitamin D-3) 25 MCG (1000 UT) tablet 1 tablet, Daily RT    cyanocobalamin (VITAMIN B-12) 2,000 mcg, oral, Daily    dilTIAZem CD (CARDIZEM CD) 180 mg, oral, Daily    empagliflozin (Jardiance) 25 mg 0.5 tablets, Daily    ezetimibe (ZETIA) 10 mg, oral, Daily    fluticasone (Flonase) 50 mcg/actuation nasal spray 1 spray, Each Nostril, Daily, Shake gently. Before first use, prime pump. After use, clean tip and replace cap.    furosemide (LASIX) 20 mg, oral, Daily    isosorbide mononitrate ER (IMDUR) 30 mg, oral, Daily    metFORMIN (GLUCOPHAGE) 500 mg, oral, Daily with breakfast    pravastatin (PRAVACHOL) 20 mg, oral, Nightly    sertraline (ZOLOFT) 100 mg, oral, Daily    tamsulosin (FLOMAX) 0.8 mg, oral, Daily    valsartan (DIOVAN) 160 mg, oral, 2 times daily    warfarin (Coumadin) 5 mg tablet Take 7.5 mg Monday- Friday and 5 mg all other days or as directed to maintain therapeutic INR as directed per physician.          REVIEW OF SYSTEMS  Review of Systems   All other systems reviewed and are negative.        VITALS  Vitals:    03/24/25 1103   BP: 138/68   Pulse:  75       PHYSICAL EXAM  Constitutional:       Appearance: Healthy appearance. Not in distress.   Neck:      Vascular: No JVR. JVD normal.   Pulmonary:      Effort: Pulmonary effort is normal.      Breath sounds: Normal breath sounds. No wheezing. No rhonchi. No rales.   Chest:      Chest wall: Not tender to palpatation.   Cardiovascular:      PMI at left midclavicular line. Normal rate. Regular rhythm. Normal S1. Normal S2.       Murmurs: There is no murmur.      No gallop.  No click. No rub.      Comments: Device left pectoral area. No hematoma or infection noted.     Pulses:     Intact distal pulses.   Edema:     Peripheral edema absent.   Abdominal:      General: Bowel sounds are normal.      Palpations: Abdomen is soft.      Tenderness: There is no abdominal tenderness.   Musculoskeletal: Normal range of motion.         General: No tenderness. Skin:     General: Skin is warm and dry.   Neurological:      General: No focal deficit present.      Mental Status: Alert and oriented to person, place and time.           ASSESSMENT AND PLAN  Clinical impression     1.  Sinus node dysfunction  2.  Status post pacemaker implantation  3.  Atrial fibrillation, no recurrence  4.  History of high risk medication (flecainide), this medication was discontinued due to shortness of breath  5.  Normal left ventricular function per echocardiogram 2024  6.  Mitral valve disease status post mitral repair with a history of endocarditis  7.  Long-term anticoagulation therapy with warfarin  8.  Hypertension  9.  Diabetes mellitus    Plan-recommendations    So far no recurrence of atrial fibrillation.  Will continue with current medical therapy that includes beta-blockers and warfarin therapy.    Follow device clinic as scheduled    Follow-up in office every 6 months or sooner if needed.    Risk factor modification and lifestyle modification discussed with patient. Diet , exercise and hydration discussed with patient.    I have  personally review with patient during this office visit, laboratory data, echocardiogram results, stress test results, Holter-event monitor results prior and after the last electrophysiology visit. All questions has been answered.    Please excuse any errors in grammar or translation related to this dictation.  Voice recognition software was utilized to prepare this document.    Scribe Attestation  By signing my name below, ISusan LPN , Scribe   attest that this documentation has been prepared under the direction and in the presence of Devan Fair MD

## 2025-03-24 NOTE — PATIENT INSTRUCTIONS
6 MONTH FOLLOW UP    Keep device checks as scheduled      DID YOU KNOW  We have a pharmacy here in the [facility].  They can fill all prescriptions, not just cardiac medications.  Prescriptions from other pharmacies can easily be transferred to the  pharmacy by the  pharmacist on site.   pharmacies offer FREE HOME DELIVERY on medications to anywhere in [place]. They can sync your medications. Typically prescriptions can be ready in 10 - 15 minutes. If pharmacy is unable to fill your  prescription or if cost is more than your paying now the Pharmacist can easily transfer back to your Pharmacy of choice. Pharmacy phone # [phone].     Please bring all medicines, vitamins, and herbal supplements with you in original bottles to every appointment!!!!    Prescriptions will not be filled unless you are compliant with your follow up appointments or have a follow up appointment scheduled as per instruction of your physician. Refills should be requested at the time of your visit.

## 2025-03-27 ENCOUNTER — TELEPHONE (OUTPATIENT)
Dept: CARDIOLOGY | Facility: CLINIC | Age: 80
End: 2025-03-27

## 2025-03-27 ENCOUNTER — ANTICOAGULATION - WARFARIN VISIT (OUTPATIENT)
Dept: CARDIOLOGY | Facility: CLINIC | Age: 80
End: 2025-03-27

## 2025-03-27 ENCOUNTER — APPOINTMENT (OUTPATIENT)
Dept: PRIMARY CARE | Facility: CLINIC | Age: 80
End: 2025-03-27
Payer: MEDICARE

## 2025-03-27 VITALS
TEMPERATURE: 98.2 F | SYSTOLIC BLOOD PRESSURE: 150 MMHG | DIASTOLIC BLOOD PRESSURE: 77 MMHG | HEIGHT: 68 IN | WEIGHT: 213.4 LBS | HEART RATE: 79 BPM | BODY MASS INDEX: 32.34 KG/M2

## 2025-03-27 DIAGNOSIS — C61 MALIGNANT NEOPLASM OF PROSTATE (MULTI): ICD-10-CM

## 2025-03-27 DIAGNOSIS — E66.811 CLASS 1 OBESITY WITH BODY MASS INDEX (BMI) OF 32.0 TO 32.9 IN ADULT, UNSPECIFIED OBESITY TYPE, UNSPECIFIED WHETHER SERIOUS COMORBIDITY PRESENT: ICD-10-CM

## 2025-03-27 DIAGNOSIS — I10 PRIMARY HYPERTENSION: ICD-10-CM

## 2025-03-27 DIAGNOSIS — E78.5 HYPERLIPIDEMIA, UNSPECIFIED HYPERLIPIDEMIA TYPE: ICD-10-CM

## 2025-03-27 DIAGNOSIS — E08.65 DIABETES MELLITUS DUE TO UNDERLYING CONDITION WITH HYPERGLYCEMIA, UNSPECIFIED WHETHER LONG TERM INSULIN USE: ICD-10-CM

## 2025-03-27 DIAGNOSIS — Z78.9 NEVER SMOKED CIGARETTES: ICD-10-CM

## 2025-03-27 LAB
ALBUMIN SERPL-MCNC: 4.5 G/DL (ref 3.6–5.1)
ALP SERPL-CCNC: 69 U/L (ref 35–144)
ALT SERPL-CCNC: 15 U/L (ref 9–46)
ANION GAP SERPL CALCULATED.4IONS-SCNC: 8 MMOL/L (CALC) (ref 7–17)
AST SERPL-CCNC: 18 U/L (ref 10–35)
BILIRUB SERPL-MCNC: 0.8 MG/DL (ref 0.2–1.2)
BUN SERPL-MCNC: 21 MG/DL (ref 7–25)
CALCIUM SERPL-MCNC: 9.1 MG/DL (ref 8.6–10.3)
CHLORIDE SERPL-SCNC: 107 MMOL/L (ref 98–110)
CO2 SERPL-SCNC: 28 MMOL/L (ref 20–32)
CREAT SERPL-MCNC: 1.06 MG/DL (ref 0.7–1.28)
EGFRCR SERPLBLD CKD-EPI 2021: 71 ML/MIN/1.73M2
ERYTHROCYTE [DISTWIDTH] IN BLOOD BY AUTOMATED COUNT: 13.9 % (ref 11–15)
GLUCOSE SERPL-MCNC: 95 MG/DL (ref 65–99)
HCT VFR BLD AUTO: 44.5 % (ref 38.5–50)
HGB BLD-MCNC: 15 G/DL (ref 13.2–17.1)
INR PPP: 2.3
MCH RBC QN AUTO: 31.8 PG (ref 27–33)
MCHC RBC AUTO-ENTMCNC: 33.7 G/DL (ref 32–36)
MCV RBC AUTO: 94.5 FL (ref 80–100)
PLATELET # BLD AUTO: 130 THOUSAND/UL (ref 140–400)
PMV BLD REES-ECKER: 10.6 FL (ref 7.5–12.5)
POTASSIUM SERPL-SCNC: 4.1 MMOL/L (ref 3.5–5.3)
PROT SERPL-MCNC: 7 G/DL (ref 6.1–8.1)
PROTHROMBIN TIME: 22.8 SEC (ref 9–11.5)
RBC # BLD AUTO: 4.71 MILLION/UL (ref 4.2–5.8)
SODIUM SERPL-SCNC: 143 MMOL/L (ref 135–146)
WBC # BLD AUTO: 6.4 THOUSAND/UL (ref 3.8–10.8)

## 2025-03-27 PROCEDURE — 1159F MED LIST DOCD IN RCRD: CPT | Performed by: FAMILY MEDICINE

## 2025-03-27 PROCEDURE — 1123F ACP DISCUSS/DSCN MKR DOCD: CPT | Performed by: FAMILY MEDICINE

## 2025-03-27 PROCEDURE — 1036F TOBACCO NON-USER: CPT | Performed by: FAMILY MEDICINE

## 2025-03-27 PROCEDURE — 3077F SYST BP >= 140 MM HG: CPT | Performed by: FAMILY MEDICINE

## 2025-03-27 PROCEDURE — 99213 OFFICE O/P EST LOW 20 MIN: CPT | Performed by: FAMILY MEDICINE

## 2025-03-27 PROCEDURE — 1158F ADVNC CARE PLAN TLK DOCD: CPT | Performed by: FAMILY MEDICINE

## 2025-03-27 PROCEDURE — 1160F RVW MEDS BY RX/DR IN RCRD: CPT | Performed by: FAMILY MEDICINE

## 2025-03-27 PROCEDURE — 3078F DIAST BP <80 MM HG: CPT | Performed by: FAMILY MEDICINE

## 2025-03-27 ASSESSMENT — PATIENT HEALTH QUESTIONNAIRE - PHQ9
SUM OF ALL RESPONSES TO PHQ9 QUESTIONS 1 AND 2: 0
1. LITTLE INTEREST OR PLEASURE IN DOING THINGS: NOT AT ALL
2. FEELING DOWN, DEPRESSED OR HOPELESS: NOT AT ALL

## 2025-03-27 ASSESSMENT — ENCOUNTER SYMPTOMS
CONSTITUTIONAL NEGATIVE: 1
CARDIOVASCULAR NEGATIVE: 1
EYES NEGATIVE: 1
RESPIRATORY NEGATIVE: 1
PSYCHIATRIC NEGATIVE: 1
HEMATOLOGIC/LYMPHATIC NEGATIVE: 1
GASTROINTESTINAL NEGATIVE: 1
MUSCULOSKELETAL NEGATIVE: 1
ENDOCRINE NEGATIVE: 1
ALLERGIC/IMMUNOLOGIC NEGATIVE: 1

## 2025-03-27 NOTE — PROGRESS NOTES
Juan Bhat is here for a follow-up on diabetes primarily.  He has been feeling well and has no new complaints.  He did cut his metformin down to once a day because of bowel problems and on some days he does not take that at all especially when he needs to leave the house.  He continues on his other medications noted.  He decided not to begin radiation therapy for his prostate cancer and will be beginning watchful waiting under the care of his urologist.  He is up-to-date with follow-up for cardiology and electrophysiology.    Patient ID: Mando Tay is a 79 y.o. male who presents for Follow-up (4m follow up):    Problem List Items Addressed This Visit    None     Past Medical History:   Diagnosis Date    Acute myocardial infarction, unspecified 11/17/2021    Acute myocardial infarction    Arrhythmia     Asthma     Cutaneous T-cell lymphoma (Multi)     Depression     Diabetes mellitus (Multi)     Epididymitis 06/02/2022    Epididymitis, right    Essential (primary) hypertension 12/08/2022    Hypertension    Heart disease     Hyperlipidemia     Kidney tumor     Obesity, unspecified 04/04/2022    Class 1 obesity with body mass index (BMI) of 31.0 to 31.9 in adult    Obesity, unspecified 06/08/2022    Class 1 obesity with body mass index (BMI) of 30.0 to 30.9 in adult    Obesity, unspecified 01/10/2022    Class 1 obesity with body mass index (BMI) of 31.0 to 31.9 in adult    Obesity, unspecified 01/31/2022    Class 1 obesity with body mass index (BMI) of 31.0 to 31.9 in adult    Other abnormal findings in urine 05/09/2022    Dark urine    Other specified disorders of kidney and ureter     Bilateral renal masses    Other specified disorders of kidney and ureter 05/09/2022    Renal mass, right    Other specified disorders of the male genital organs 05/09/2022    Swelling of right half of scrotum    Other symptoms and signs involving the musculoskeletal system 11/17/2021    Leg fatigue    Personal history of other  diseases of the circulatory system 11/17/2021    History of mitral valve insufficiency    Personal history of other diseases of the respiratory system 12/09/2021    History of acute bronchitis    Personal history of other specified conditions 04/04/2022    History of shortness of breath    Personal history of other specified conditions     History of snoring    Personal history of pneumonia (recurrent) 01/31/2022    History of pneumonia    Prediabetes 06/18/2018    Borderline diabetes mellitus    Scrotal pain 05/09/2022    Acute pain in scrotum    Shortness of breath 12/09/2022    Shortness of breath on exertion    Sleep apnea     cpap    Unspecified cataract 02/22/2017    Cataract of right eye    Unspecified cataract 02/22/2017    Cataract of left eye    Unspecified osteoarthritis, unspecified site     Arthritis      Past Surgical History:   Procedure Laterality Date    CORONARY ARTERY BYPASS GRAFT  02/22/2017    Coronary Artery Surgery    HERNIA REPAIR  02/22/2017    Inguinal Hernia Repair    INSERT / REPLACE / REMOVE PACEMAKER      x3    OTHER SURGICAL HISTORY  11/17/2021    Surgery    OTHER SURGICAL HISTORY  11/17/2021    Cardioversion    OTHER SURGICAL HISTORY  12/20/2021    Mitral valve replacement    OTHER SURGICAL HISTORY  12/20/2021    Cardiac catheterization    PROSTATE BIOPSY  10/29/2024    RENAL MASS EXCISION Left 04/2024    tumor removed      Family History   Problem Relation Name Age of Onset    Other (Cardiac disorder) Mother      Cataracts Mother      Hypertension Mother      Other (Cardiac disorder) Father      Stomach cancer Father      Hypertension Father        Social History     Socioeconomic History    Marital status:      Spouse name: Not on file    Number of children: Not on file    Years of education: Not on file    Highest education level: Not on file   Occupational History    Not on file   Tobacco Use    Smoking status: Never     Passive exposure: Past    Smokeless tobacco: Never    Vaping Use    Vaping status: Never Used   Substance and Sexual Activity    Alcohol use: Not Currently     Comment: not for years    Drug use: Never    Sexual activity: Defer   Other Topics Concern    Not on file   Social History Narrative    Not on file     Social Drivers of Health     Financial Resource Strain: Not on file   Food Insecurity: Not on file   Transportation Needs: Not on file   Physical Activity: Not on file   Stress: Not on file   Social Connections: Not on file   Intimate Partner Violence: Not on file   Housing Stability: Not on file      Atorvastatin, Flecainide, Rosuvastatin, Statins-hmg-coa reductase inhibitors, Sulfa (sulfonamide antibiotics), and Procainamide   Current Outpatient Medications   Medication Sig Dispense Refill    betamethasone valerate (Valisone) 0.1 % cream Apply topically 2 times a day. 15 g 0    calcium carbonate-vitamin D3 (Oyster Shell) 250 mg-3.125 mcg (125 unit) tablet Take 1 tablet by mouth 2 times daily (morning and late afternoon).      carvedilol (Coreg) 25 mg tablet Take 1.5 tablets (37.5 mg) by mouth 2 times a day. 180 tablet 3    cholecalciferol (Vitamin D-3) 25 MCG (1000 UT) tablet Take 1 tablet (25 mcg) by mouth once daily.      cyanocobalamin (Vitamin B-12) 1,000 mcg tablet Take 2 tablets (2,000 mcg) by mouth once daily.      dilTIAZem CD (Cardizem CD) 180 mg 24 hr capsule Take 1 capsule (180 mg) by mouth once daily. 90 capsule 3    empagliflozin (Jardiance) 25 mg Take 0.5 tablets (12.5 mg) by mouth once daily.      ezetimibe (Zetia) 10 mg tablet Take 1 tablet (10 mg) by mouth once daily. 90 tablet 3    fluticasone (Flonase) 50 mcg/actuation nasal spray Administer 1 spray into each nostril once daily. Shake gently. Before first use, prime pump. After use, clean tip and replace cap. 54 g 3    furosemide (Lasix) 20 mg tablet Take 1 tablet (20 mg) by mouth once daily. 90 tablet 3    isosorbide mononitrate ER (Imdur) 30 mg 24 hr tablet Take 1 tablet (30 mg) by mouth  once daily. 90 tablet 3    metFORMIN (Glucophage) 500 mg tablet Take 1 tablet (500 mg) by mouth once daily with breakfast. 90 tablet 1    pravastatin (Pravachol) 20 mg tablet Take 1 tablet (20 mg) by mouth once daily at bedtime. 90 tablet 3    sertraline (Zoloft) 100 mg tablet Take 1 tablet (100 mg) by mouth once daily. 90 tablet 1    tamsulosin (Flomax) 0.4 mg 24 hr capsule Take 2 capsules (0.8 mg) by mouth once daily. 90 capsule 3    valsartan (Diovan) 160 mg tablet Take 1 tablet (160 mg) by mouth 2 times a day. 180 tablet 3    warfarin (Coumadin) 5 mg tablet Take 7.5 mg Monday- Friday and 5 mg all other days or as directed to maintain therapeutic INR as directed per physician. 110 tablet 0     No current facility-administered medications for this visit.       Immunization History   Administered Date(s) Administered    COVID-19, mRNA, LNP-S, PF, 30 mcg/0.3 mL dose 02/10/2021, 03/02/2021, 10/13/2021    Flu vaccine (IIV4), preservative free *Check age/dose* 10/08/2020    Flu vaccine, quadrivalent, high-dose, preservative free, age 65y+ (FLUZONE) 10/03/2023    Flu vaccine, trivalent, preservative free, HIGH-DOSE, age 65y+ (Fluzone) 11/30/2017, 10/16/2018, 10/17/2019, 11/05/2024    Influenza, Seasonal, Quadrivalent, Adjuvanted 11/22/2021, 11/07/2022    Influenza, Unspecified 10/01/2010, 01/01/2014, 02/01/2015, 10/01/2018, 11/01/2022    Influenza, live, intranasal, quadrivalent 11/22/2021    Influenza, seasonal, injectable 01/15/2014, 11/28/2018, 08/01/2019, 10/01/2020, 09/13/2021, 09/19/2022    Pfizer COVID-19 vaccine, 12 years and older, (30mcg/0.3mL) (Comirnaty) 01/26/2024    Pfizer Gray Cap SARS-CoV-2 05/05/2022    Pfizer Purple Cap SARS-CoV-2 02/07/2022    Pneumococcal Conjugate PCV 7 1945    Pneumococcal conjugate vaccine, 13-valent (PREVNAR 13) 03/03/2017    Pneumococcal polysaccharide vaccine, 23-valent, age 2 years and older (PNEUMOVAX 23) 02/10/2014, 07/30/2019, 08/16/2021    Tdap vaccine, age 7 year  and older (BOOSTRIX, ADACEL) 07/15/2024    Zoster vaccine, recombinant, adult (SHINGRIX) 06/19/2019, 08/19/2019    Zoster, Unspecified 07/01/2019, 08/31/2019        Review of Systems   Constitutional: Negative.    HENT: Negative.     Eyes: Negative.    Respiratory: Negative.     Cardiovascular: Negative.    Gastrointestinal: Negative.    Endocrine: Negative.    Genitourinary: Negative.    Musculoskeletal: Negative.    Skin: Negative.    Allergic/Immunologic: Negative.    Hematological: Negative.    Psychiatric/Behavioral: Negative.     All other systems reviewed and are negative.       Vitals:    03/27/25 1133   BP: 150/77   Pulse: 79   Temp: 36.8 °C (98.2 °F)     Vitals:    03/27/25 1133   Weight: 96.8 kg (213 lb 6.4 oz)      Physical Exam  Constitutional:       General: He is not in acute distress.     Appearance: Normal appearance.   Cardiovascular:      Rate and Rhythm: Normal rate and regular rhythm.      Pulses: Normal pulses.      Heart sounds: Murmur (Regular S1-S2 with mechanical valvular sounds.) heard.      No friction rub. No gallop.   Pulmonary:      Effort: Pulmonary effort is normal. No respiratory distress.      Breath sounds: Normal breath sounds. No wheezing or rales.   Neurological:      General: No focal deficit present.      Mental Status: He is alert and oriented to person, place, and time. Mental status is at baseline.          ASSESSMENT/PLAN: Diabetes type 2 with slight worsening.  A1c 7.6.  We discussed the use of metformin.  Patient states that he is willing to try the metformin 500 mg twice daily again.  He will call if diarrhea becomes difficulty.  Continue Jardiance daily check urine for microalbumin creatinine ratio.  Eye examination is up-to-date.  Check CMP and A1c in 4 months    Hyperlipidemia.  Continue current meds.  Check lipid profile    Hypertension with slightly elevated reading    Prostate cancer with watchful waiting under the direction of urology    Renal mass monitored by  urology    Restrictive lung disease followed by pulmonology  Need to discuss RSV vaccination at next office visit    Follow-up 4 months and call as needed       Scribe Attestation  By signing my name below, I, Juliann Tamayo LPN, Scribe   attest that this documentation has been prepared under the direction and in the presence of Juan West MD.

## 2025-03-27 NOTE — TELEPHONE ENCOUNTER
Called patient who was notified regarding change in Warfarin dosing to 7.5 mg every Mon, Weds, and Fri and 5 mg all other days of the week.  Patient was given INR order for 3 weeks approx. 4/16/25.  Patient verbalized understanding of all orders.  Tiera Francis, CMA

## 2025-03-27 NOTE — TELEPHONE ENCOUNTER
Returned patient's voice mail regarding INR.  Patient was not availabe and a voice mail was left instructing patient to call the office.  Tiera Francis, CMA

## 2025-03-27 NOTE — PROGRESS NOTES
Called Mando Tay regarding INR 2.3 with gaol 2.5-3.5.      Will change coumadin  dosing to 7.5 mg 3 days per week Mon- Wed- Fri along with  Coumadin 5 mg all other days.     Repeat INR 3 weeks, approximately  4/16/2025.    No answer. I left message for Mando to call office for INR orders/recommendations.

## 2025-03-31 DIAGNOSIS — N40.1 BPH WITH OBSTRUCTION/LOWER URINARY TRACT SYMPTOMS: ICD-10-CM

## 2025-03-31 DIAGNOSIS — N13.8 BPH WITH OBSTRUCTION/LOWER URINARY TRACT SYMPTOMS: ICD-10-CM

## 2025-04-01 RX ORDER — TAMSULOSIN HYDROCHLORIDE 0.4 MG/1
0.8 CAPSULE ORAL DAILY
Qty: 180 CAPSULE | Refills: 3 | Status: SHIPPED | OUTPATIENT
Start: 2025-04-01 | End: 2026-04-01

## 2025-04-07 ENCOUNTER — HOSPITAL ENCOUNTER (OUTPATIENT)
Dept: RADIOLOGY | Facility: CLINIC | Age: 80
Discharge: HOME | End: 2025-04-07
Payer: MEDICARE

## 2025-04-07 DIAGNOSIS — N28.89 RENAL MASS, LEFT: ICD-10-CM

## 2025-04-07 PROCEDURE — 74170 CT ABD WO CNTRST FLWD CNTRST: CPT

## 2025-04-07 PROCEDURE — 74170 CT ABD WO CNTRST FLWD CNTRST: CPT | Performed by: STUDENT IN AN ORGANIZED HEALTH CARE EDUCATION/TRAINING PROGRAM

## 2025-04-07 PROCEDURE — 2550000001 HC RX 255 CONTRASTS: Performed by: UROLOGY

## 2025-04-07 RX ADMIN — IOHEXOL 75 ML: 350 INJECTION, SOLUTION INTRAVENOUS at 12:25

## 2025-04-17 ENCOUNTER — ANTICOAGULATION - WARFARIN VISIT (OUTPATIENT)
Dept: CARDIOLOGY | Facility: CLINIC | Age: 80
End: 2025-04-17
Payer: MEDICARE

## 2025-04-17 LAB
INR PPP: 3
PROTHROMBIN TIME: 29.4 SEC (ref 9–11.5)

## 2025-04-17 NOTE — PROGRESS NOTES
Called and spoke with Mando Tay regarding INR 3.0 with goal 2.5-3.5.  Will continue current Coumadin dosing and recheck INR 4 weeks approximately  5/14/2025.    Mando verbalizes understanding.

## 2025-04-24 ENCOUNTER — APPOINTMENT (OUTPATIENT)
Dept: UROLOGY | Facility: CLINIC | Age: 80
End: 2025-04-24
Payer: MEDICARE

## 2025-04-29 ENCOUNTER — APPOINTMENT (OUTPATIENT)
Dept: UROLOGY | Facility: CLINIC | Age: 80
End: 2025-04-29
Payer: MEDICARE

## 2025-04-29 VITALS — SYSTOLIC BLOOD PRESSURE: 172 MMHG | DIASTOLIC BLOOD PRESSURE: 89 MMHG | HEART RATE: 90 BPM | TEMPERATURE: 98.3 F

## 2025-04-29 DIAGNOSIS — N28.89 RENAL MASS, LEFT: ICD-10-CM

## 2025-04-29 DIAGNOSIS — C61 PROSTATE CANCER (MULTI): Primary | ICD-10-CM

## 2025-04-29 PROCEDURE — 3079F DIAST BP 80-89 MM HG: CPT | Performed by: NURSE PRACTITIONER

## 2025-04-29 PROCEDURE — 99214 OFFICE O/P EST MOD 30 MIN: CPT | Performed by: NURSE PRACTITIONER

## 2025-04-29 PROCEDURE — 1159F MED LIST DOCD IN RCRD: CPT | Performed by: NURSE PRACTITIONER

## 2025-04-29 PROCEDURE — 3077F SYST BP >= 140 MM HG: CPT | Performed by: NURSE PRACTITIONER

## 2025-04-29 PROCEDURE — 1123F ACP DISCUSS/DSCN MKR DOCD: CPT | Performed by: NURSE PRACTITIONER

## 2025-04-29 NOTE — PROGRESS NOTES
Subjective   Patient ID: Mando Tay is a 80 y.o. male who presents for Results.  80 male who presents for CT scan results (S/P left cryoablation in April 2024). Previously seen by Dr. Harp. Denies all urinary symptoms including gross hematuria and flank pain. Feeling well at present.     Diagnosed with prostatic adenocarcinoma in Nov 2024 with samples of Rik 4+3=7, GG 3, upto 25% of tissue samples were involved PSA was 8.17. He had 4 positive samples; 2 were graded as a Rik 6 and the other 2 were Elgin 7. He met with radiation oncology and has decided not to pursue treatment at this time.     Multiple other comorbidities including restrictive lung disorder and aflutter on AC.            Review of Systems   All other systems reviewed and are negative.      Objective   Physical Exam  Vitals reviewed.     Alert and oriented x3  Moist mucous membranes  Breathes easily on room air  Abdomen soft, nondistended. Obese  No edema  No scleral icterus  No focal neurological deficits  Appears stated age, no acute distress    === 04/07/25 ===    CT KIDNEY WO AND W CONTRAST    - Impression -  1.  Redemonstration postoperative changes left cryoablation with  significant interval decrease in the size of nonenhancing soft tissue  mass stable fat attenuation lesion, likely sequela of fat necrosis.  No suspicious enhancement noted.  2. Other bilateral simple cysts.  3.    Signed by: Orlando Ramírez 4/8/2025 11:12 AM  Dictation workstation:   [id number]    Lab Results   Component Value Date    PSA 8.17 (H) 08/29/2024    PSA 4.59 (H) 02/01/2023    PSA 2.60 09/16/2021         Assessment/Plan   Diagnoses and all orders for this visit:  Prostate cancer (Multi)  -     PSA; Future  -     Basic metabolic panel; Future  -     CT kidney w and wo IV contrast; Future  -     PSA; Future  -     PSA; Future  Renal mass, left  -     PSA; Future  -     Basic metabolic panel; Future  -     CT kidney w and wo IV contrast; Future  -      PSA; Future    Reviewed plan of care for management of prostate CA. He would like to follow PSA and states he would consider treatment if PSA continues to increase however not interested in pursuing now or pursuing repeat biopsy per SOC 1 year s/p initial diagnosis.     Reviewed CT scan which identified appropriate response s/p cryoablation. Plan for repeat imaging in 6 months and q3 month surveillance of PSA. Patient verbalized understanding.          Roslyn Moraes, ROBERT-CNP 04/29/25 9:26 AM

## 2025-05-21 ENCOUNTER — ANTICOAGULATION - WARFARIN VISIT (OUTPATIENT)
Dept: CARDIOLOGY | Facility: CLINIC | Age: 80
End: 2025-05-21
Payer: MEDICARE

## 2025-05-21 LAB
INR PPP: 2.5
PROTHROMBIN TIME: 25.1 SEC (ref 9–11.5)

## 2025-05-21 NOTE — PROGRESS NOTES
Called and spoke with Mando Tay regarding INR 2.5  INR Goal 2.5 - 3.5    Continue coumadin  dosing to 7.5 mg 3 days per week Mon- Wed- Fri along with  Coumadin 5 mg all other days.     Repeat INR 4 weeks, approximately  6/18/2025.    Mr Tay verbalizes understanding.

## 2025-05-27 ENCOUNTER — APPOINTMENT (OUTPATIENT)
Dept: CARDIOLOGY | Facility: CLINIC | Age: 80
End: 2025-05-27
Payer: MEDICARE

## 2025-05-27 VITALS
HEART RATE: 84 BPM | WEIGHT: 210.6 LBS | BODY MASS INDEX: 31.92 KG/M2 | DIASTOLIC BLOOD PRESSURE: 76 MMHG | HEIGHT: 68 IN | SYSTOLIC BLOOD PRESSURE: 134 MMHG

## 2025-05-27 DIAGNOSIS — Z79.899 HIGH RISK MEDICATION USE: ICD-10-CM

## 2025-05-27 DIAGNOSIS — I10 PRIMARY HYPERTENSION: ICD-10-CM

## 2025-05-27 DIAGNOSIS — I48.19 PERSISTENT ATRIAL FIBRILLATION (MULTI): ICD-10-CM

## 2025-05-27 DIAGNOSIS — R06.02 SHORTNESS OF BREATH: ICD-10-CM

## 2025-05-27 DIAGNOSIS — I05.9 MITRAL VALVE DISEASE: Primary | ICD-10-CM

## 2025-05-27 PROCEDURE — 1159F MED LIST DOCD IN RCRD: CPT | Performed by: INTERNAL MEDICINE

## 2025-05-27 PROCEDURE — 99214 OFFICE O/P EST MOD 30 MIN: CPT | Performed by: INTERNAL MEDICINE

## 2025-05-27 PROCEDURE — G2211 COMPLEX E/M VISIT ADD ON: HCPCS | Performed by: INTERNAL MEDICINE

## 2025-05-27 PROCEDURE — 3075F SYST BP GE 130 - 139MM HG: CPT | Performed by: INTERNAL MEDICINE

## 2025-05-27 PROCEDURE — 3078F DIAST BP <80 MM HG: CPT | Performed by: INTERNAL MEDICINE

## 2025-05-27 RX ORDER — WARFARIN SODIUM 5 MG/1
TABLET ORAL
Qty: 110 TABLET | Refills: 0 | Status: SHIPPED | OUTPATIENT
Start: 2025-05-27

## 2025-05-27 NOTE — PROGRESS NOTES
Patient:  Mando Tay  YOB: 1945  MRN: [id number]       Impression/Plan:     Diagnoses and all orders for this visit:  Mitral valve disease  -    Clinically no evidence to suggest deterioration of his mechanical mitral valve  -    INR has been therapeutic  Primary hypertension  -    Quite well-controlled on current dose of carvedilol  Persistent atrial fibrillation (Multi)        -    No clinical recurrence and pacemaker checks have not shown recurrence pacemaker continues to function well  Shortness of breath        -    Clinically without evidence of volume overload and no signs of recurrent atrial fibrillation and dyspnea is relatively mild though persistent        -    Primary etiology is his restrictive lung disease which I suspect is worsened secondary to increased abdominal girth have encouraged him to continue to try to exercise and he does notice he feels better as he gets more active outside.  Weight loss would also be of benefit.  High risk medication use        -    On long-term warfarin secondary mechanical valve no evidence of bleeding laboratories stable as to renal liver function and CBC    Chief Complaint/Active Symptoms:      Chief Complaint   Patient presents with    Follow-up     Three month follow up.        Mando Tay is a 80 y.o. male who presents with paroxysmal atrial flutter, previous mechanical mitral valve replacement for mitral valve prolapse and endocarditis in 1998, sick sinus syndrome with permanent pacemaker.  Coronary angiography at that time with trivial irregularities circumflex only.  He has longstanding hypertension and diabetes.  From medical standpoint also with history of restrictive lung disease, T-cell lymphoma diagnosed in 2015.  Does have moderately severe restrictive lung disease     FALL 2022 he had symptoms of shortness of breath and CHF felt related to atrial flutter. He was put on flecainide cardioverted initially felt better but then further  deteriorated had episodes of pneumonia some unexplained shortness of breath with eventually was felt related to flecainide.. He felt better off the flecainide which was discontinued in April. There is a 5-10% incidence of sensation of dyspnea associated with flecainide. Then became ill again with orchiditis. Subsequent CT scan demonstrated a right renal cyst and a left renal mass. He has since seen urology who feels it very likely could be malignancy but because of its size metastatic progression relatively unlikely and and serial imaging is planned.      Seen in pulmonary medicine office 10/17/2023 at which time the moderately severe restrictive lung disease described. .        4/23/2024 underwent left renal mass biopsy and cryoablation no cardiac complications described.       9/27/2024 direct-current cardioversion after MARTINA demonstrated normal valve and LV function and no thrombus. Tiny PFO identified.     September 2024: Normal Lexiscan perfusion study    Fall 2024 prostate carcinoma diagnosed    I had last seen him 2/20/2025 which time blood pressure was suboptimally controlled and carvedilol dose increased.  He had low burden of A-fib and no bleeding on anticoagulation.    Seen by Dr. Fair for pacemaker evaluation 3/24/2025.  Interrogation from February demonstrated no evidence of atrial fibrillation with 3.5-year battery life.  No symptoms at that time    He denies angina, dyspnea, palpitation, edema, lightheadedness or syncope.  He has had no symptoms of claudication or neurologic deterioration.  There have been no hospitalizations or emergency room visits since last office visit.    He is doing quite well.  His prostate carcinoma was felt to be quite mild so no treatment he is had no recurrence of his renal carcinoma.  He has had no chest pain.  He was to have mild chronic dyspnea felt related to his restrictive lung disease.  No lower extremity edema.  He is eating and drinking well.  He has no recurrent  of his A-fib symptomatically.    CBC and CMP 3/26/2025 unremarkable             Review of Systems: Unremarkable except as noted above    Meds     Current Outpatient Medications   Medication Instructions    betamethasone valerate (Valisone) 0.1 % cream Topical, 2 times daily    calcium carbonate-vitamin D3 (Oyster Shell) 250 mg-3.125 mcg (125 unit) tablet 1 tablet, 2 times daily (morning and late afternoon)    carvedilol (COREG) 37.5 mg, oral, 2 times daily    cholecalciferol (Vitamin D-3) 25 MCG (1000 UT) tablet 1 tablet, Daily RT    cyanocobalamin (VITAMIN B-12) 2,000 mcg, oral, Daily    dilTIAZem CD (CARDIZEM CD) 180 mg, oral, Daily    empagliflozin (Jardiance) 25 mg 0.5 tablets, Daily    ezetimibe (ZETIA) 10 mg, oral, Daily    fluticasone (Flonase) 50 mcg/actuation nasal spray 1 spray, Each Nostril, Daily, Shake gently. Before first use, prime pump. After use, clean tip and replace cap.    furosemide (LASIX) 20 mg, oral, Daily    isosorbide mononitrate ER (IMDUR) 30 mg, oral, Daily    metFORMIN (GLUCOPHAGE) 500 mg, oral, Daily with breakfast    pravastatin (PRAVACHOL) 20 mg, oral, Nightly    sertraline (ZOLOFT) 100 mg, oral, Daily    tamsulosin (FLOMAX) 0.8 mg, oral, Daily    valsartan (DIOVAN) 160 mg, oral, 2 times daily    warfarin (Coumadin) 5 mg tablet Take 7.5 mg Monday- Friday and 5 mg all other days or as directed to maintain therapeutic INR as directed per physician.        Allergies   Allergies[1]      Annotated Problems     Specialty Problems          Cardiology Problems    History of mitral valve replacement    1998 [employer] mechanical mitral valve replacement         Presence of cardiac pacemaker     2/18/15 generator change to Medtronic Adapta L       10/04 Generator change to Medtronic Benson MEJIA [id number]      1998 first device (pacesetter) placed due to CHB after MVR surgery.         Mitral valve disease    · 12/7/2021 echo LVEF 60%. LVH. RVSP 44 mm. 2+ AI unchanged. Normal function       mechanical mitral valve       History of mitral valve prolapse with mitral valve replacement mechanical [employer]        valve 1998 secondary to severe MR and endocarditis   History of mitral valve prolapse with mitral valve replacement mechanical [employer]      valve 1998 secondary to severe MR and endocarditis      Late 1997 MI- Felt due to emboli from mitral valve vegetation.         Primary hypertension    Atypical atrial flutter    Anticoagulant long-term use    Hyperlipidemia     · 1997 trivial coronary irreg in cx. normal LV, severe MR w/subsequent MVR.          Paroxysmal atrial fibrillation (Multi)    Paroxysmal atrial flutter (Multi)    Sick sinus syndrome (Multi)    Chronic diastolic congestive heart failure, NYHA class 2    Never smoked cigarettes    Ascending aortic aneurysm    CT scan 9/27/2024: Ascending aorta 42 mm         Coronary artery disease involving native coronary artery of native heart without angina pectoris        Problem List     Patient Active Problem List    Diagnosis Date Noted    Diabetes mellitus due to underlying condition with hyperglycemia 03/27/2025    Malignant neoplasm of prostate (Multi) 01/24/2025    Coronary artery disease involving native coronary artery of native heart without angina pectoris 10/01/2024    Ascending aortic aneurysm 10/01/2024    Medicare annual wellness visit, subsequent 06/06/2024    Never smoked cigarettes 06/06/2024    Pre-operative clearance 04/04/2024    Chronic diastolic congestive heart failure, NYHA class 2 10/30/2023    Medication dose changed 10/30/2023    Adrenal nodule 10/01/2023    Renal mass, left 10/01/2023    Arthritis 10/01/2023    Atypical depressive disorder 10/01/2023    Bilateral dry eyes 10/01/2023    BPPV (benign paroxysmal positional vertigo) 10/01/2023    Combined form of age-related cataract, both eyes 10/01/2023    Cyst of testis 10/01/2023    Dysphagia 10/01/2023    Edema 10/01/2023    Fatigue 10/01/2023    Hydrocele,  bilateral 10/01/2023    Hyperlipidemia 10/01/2023    Hyperopia with presbyopia 10/01/2023    MGD (meibomian gland disease) 10/01/2023    Ocular migraine 10/01/2023    Orchitis and epididymitis 10/01/2023    Paroxysmal atrial fibrillation (Multi) 10/01/2023    Retinal scar of right eye 10/01/2023    Shortness of breath 10/01/2023    Sick sinus syndrome (Multi) 10/01/2023    Superficial basal cell carcinoma of skin of left shoulder 10/01/2023    Vestibulopathy 10/01/2023    Vitamin B 12 deficiency 10/01/2023    Vitamin D deficiency 10/01/2023    Hypoxia 10/01/2023    Anticoagulant long-term use 10/01/2023    Bilateral sensorineural hearing loss 10/01/2023    Chronic nasal congestion 10/01/2023    Class 1 obesity with body mass index (BMI) of 32.0 to 32.9 in adult 10/01/2023    Degenerative cervical spinal stenosis 10/01/2023    Diabetes mellitus treated with oral medication (Multi) 10/01/2023    High risk medication use 10/01/2023    History of snoring 10/01/2023    Hypertrophy of inferior nasal turbinate 10/01/2023    Paroxysmal atrial flutter (Multi) 10/01/2023    Tinnitus 10/01/2023    Agent orange exposure 08/21/2023    Atypical atrial flutter 08/21/2023    BPH with obstruction/lower urinary tract symptoms 08/21/2023    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    Erectile dysfunction 08/21/2023    Major depressive disorder with single episode, in full remission 08/21/2023    Acute bronchitis due to other specified organisms 08/21/2023    Hemangioma of skin and subcutaneous tissue 06/20/2023    Actinic keratosis 06/20/2023    Other seborrheic keratosis 06/20/2023    Melanocytic nevi of scalp and neck 06/20/2023    Melanocytic nevi of trunk 06/20/2023    Melanocytic nevi of unspecified lower limb, including hip 06/20/2023    Melanocytic nevi of unspecified part of face 06/20/2023    Melanocytic nevi of unspecified upper limb, including shoulder 06/20/2023    Neoplasm of uncertain behavior of skin 06/20/2023     Other hypertrophic disorders of the skin 06/20/2023    Other melanin hyperpigmentation 06/20/2023    Personal history of other malignant neoplasm of skin 06/20/2023    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    Viral wart, unspecified 06/20/2023    Lumbar stenosis with neurogenic claudication 03/17/2020    Myalgia, unspecified site 09/03/2019    Radial styloid tenosynovitis 06/13/2019    RAMSES (obstructive sleep apnea) 02/27/2018    Primary hypertension 02/27/2018    Mitral valve disease 02/27/2018    Presence of cardiac pacemaker 01/24/2018    History of mitral valve replacement 01/24/2018    Cervical spondylosis without myelopathy 02/02/2016    Chondromalacia of patella 01/19/2016    Primary localized osteoarthrosis of shoulder region 01/19/2016    Adhesive capsulitis of shoulder 01/19/2016       Objective:     There were no vitals filed for this visit.   Wt Readings from Last 4 Encounters:   03/27/25 96.8 kg (213 lb 6.4 oz)   03/24/25 95.7 kg (211 lb)   02/20/25 97.2 kg (214 lb 3.2 oz)   02/03/25 97.3 kg (214 lb 9.6 oz)           LAB:     Lab Results   Component Value Date    WBC 6.4 03/26/2025    HGB 15.0 03/26/2025    HCT 44.5 03/26/2025     (L) 03/26/2025    CHOL 157 06/27/2024    TRIG 144 06/27/2024    HDL 42.7 06/27/2024    ALT 15 03/26/2025    AST 18 03/26/2025     03/26/2025    K 4.1 03/26/2025     03/26/2025    CREATININE 1.06 03/26/2025    BUN 21 03/26/2025    CO2 28 03/26/2025    TSH 1.34 04/04/2022    PSA 8.17 (H) 08/29/2024    INR 2.5 (H) 05/20/2025    HGBA1C 7.6 (H) 03/12/2025         Physical Exam     General Appearance: alert and oriented to person, place and time, in no acute distress  Cardiovascular: normal rate, regular rhythm, normal S1 and S2, no murmurs, rubs, clicks, or gallops,  no JVD crisp mechanical mitral valve sounds  Pulmonary/Chest: clear to auscultation bilaterally- no wheezes, rales or rhonchi, normal air movement, no respiratory distress  Abdomen: Obese, soft,  non-tender, non-distended, normal bowel sounds, no masses   Extremities: no cyanosis, clubbing or edema  Skin: warm and dry, no rash or erythema  Eyes: EOMI  Neck: supple and non-tender without mass, no thyromegaly   Neurological: alert, oriented, normal speech, no focal findings or movement disorder noted  Vascular: Pulses 2+    Scribe Attestation  By signing my name below, I, Fabi Miranda RN, Scribe   attest that this documentation has been prepared under the direction and in the presence of Ck Celis MD.        Provider attestation-scribe documentation  Any medical record entries made by the scribe were at my discretion and personally dictated by me.  I have reviewed the chart and agree that the record accurately reflects my personal performance of the history, physical exam, discussion and plan.                 [1]   Allergies  Allergen Reactions    Atorvastatin Myalgia    Flecainide Unknown    Rosuvastatin Myalgia    Statins-Hmg-Coa Reductase Inhibitors Unknown     Statins do not work    Sulfa (Sulfonamide Antibiotics) Unknown    Procainamide Rash

## 2025-05-27 NOTE — PATIENT INSTRUCTIONS
FOLLOW UP WITH Dr. Ck Celis MD, [facility] IN 4 MONTHS      DID YOU KNOW  We have a pharmacy here in the [facility].  They can fill all prescriptions, not just cardiac medications.  Prescriptions from other pharmacies can easily be transferred to the  pharmacy by the  pharmacist on site.   pharmacies offer FREE HOME DELIVERY on medications to anywhere in [place]. They can sync your medications. Typically prescriptions can be ready in 10 - 15 minutes. If pharmacy is unable to fill your  prescription or if cost is more than your paying now the Pharmacist can easily transfer back to your Pharmacy of choice. Pharmacy phone # [phone].     Please bring all medicines, vitamins, and herbal supplements with you in original bottles to every appointment! This is the best way to ensure your medication list in your chart is accurate.    Prescriptions will not be filled unless you are compliant with your follow up appointments or have a follow up appointment scheduled as per instruction of your physician. Refills should be requested at the time of your visit.

## 2025-06-02 ENCOUNTER — HOSPITAL ENCOUNTER (OUTPATIENT)
Dept: CARDIOLOGY | Age: 80
Discharge: HOME | End: 2025-06-02
Payer: MEDICARE

## 2025-06-02 PROCEDURE — 93280 PM DEVICE PROGR EVAL DUAL: CPT | Performed by: INTERNAL MEDICINE

## 2025-06-02 PROCEDURE — 93280 PM DEVICE PROGR EVAL DUAL: CPT

## 2025-06-19 LAB
INR PPP: 2.7
PROTHROMBIN TIME: 26.7 SEC (ref 9–11.5)

## 2025-06-20 ENCOUNTER — ANTICOAGULATION - WARFARIN VISIT (OUTPATIENT)
Dept: CARDIOLOGY | Facility: CLINIC | Age: 80
End: 2025-06-20
Payer: MEDICARE

## 2025-06-20 NOTE — PROGRESS NOTES
RN received phone call back from patient.  RN advised of MARIANO Jordan message regarding INR results.  Patient verbalizes good understanding and is agreeable to plan.    Fabi Miranda RN

## 2025-06-20 NOTE — PROGRESS NOTES
Called Mando Tay with results of INR 2.7  INR Goal 2.5 - 3.5    Continue coumadin  dosing to 7.5 mg 3 days per week Mon- Wed- Fri along with  Coumadin 5 mg all other days.     Repeat INR 4 weeks, approximately  7/16/2025.    No answer, left message to call office regarding INR orders.

## 2025-07-16 ENCOUNTER — APPOINTMENT (OUTPATIENT)
Dept: UROLOGY | Facility: CLINIC | Age: 80
End: 2025-07-16
Payer: MEDICARE

## 2025-07-16 LAB
ALBUMIN SERPL-MCNC: 4.3 G/DL (ref 3.6–5.1)
ALP SERPL-CCNC: 66 U/L (ref 35–144)
ALT SERPL-CCNC: 20 U/L (ref 9–46)
ANION GAP SERPL CALCULATED.4IONS-SCNC: 8 MMOL/L (CALC) (ref 7–17)
AST SERPL-CCNC: 23 U/L (ref 10–35)
BILIRUB SERPL-MCNC: 1 MG/DL (ref 0.2–1.2)
BUN SERPL-MCNC: 21 MG/DL (ref 7–25)
CALCIUM SERPL-MCNC: 8.9 MG/DL (ref 8.6–10.3)
CHLORIDE SERPL-SCNC: 102 MMOL/L (ref 98–110)
CHOLEST SERPL-MCNC: 156 MG/DL
CHOLEST/HDLC SERPL: 2.8 (CALC)
CO2 SERPL-SCNC: 29 MMOL/L (ref 20–32)
CREAT SERPL-MCNC: 1.17 MG/DL (ref 0.7–1.22)
EGFRCR SERPLBLD CKD-EPI 2021: 63 ML/MIN/1.73M2
EST. AVERAGE GLUCOSE BLD GHB EST-MCNC: 163 MG/DL
EST. AVERAGE GLUCOSE BLD GHB EST-SCNC: 9 MMOL/L
GLUCOSE SERPL-MCNC: 121 MG/DL (ref 65–139)
HBA1C MFR BLD: 7.3 %
HDLC SERPL-MCNC: 55 MG/DL
INR PPP: 2.9
LDLC SERPL CALC-MCNC: 84 MG/DL (CALC)
NONHDLC SERPL-MCNC: 101 MG/DL (CALC)
POTASSIUM SERPL-SCNC: 3.9 MMOL/L (ref 3.5–5.3)
PROT SERPL-MCNC: 6.8 G/DL (ref 6.1–8.1)
PROTHROMBIN TIME: 28.8 SEC (ref 9–11.5)
SODIUM SERPL-SCNC: 139 MMOL/L (ref 135–146)
TRIGL SERPL-MCNC: 76 MG/DL

## 2025-07-18 ENCOUNTER — ANTICOAGULATION - WARFARIN VISIT (OUTPATIENT)
Dept: CARDIOLOGY | Facility: CLINIC | Age: 80
End: 2025-07-18
Payer: MEDICARE

## 2025-07-18 NOTE — PROGRESS NOTES
Called Mando Tay regarding INR 2.9, will continue coumadin  dosing to 7.5 mg 3 days per week Mon- Wed- Fri along with  Coumadin 5 mg all other days.     Repeat INR 4 weeks, approximately  8/13/2025.    No answer, left message that would send message through [facility] my chart regarding recommendations and to call office with any questions or concerns.

## 2025-07-28 ENCOUNTER — APPOINTMENT (OUTPATIENT)
Dept: PRIMARY CARE | Facility: CLINIC | Age: 80
End: 2025-07-28
Payer: MEDICARE

## 2025-07-28 VITALS
BODY MASS INDEX: 31.31 KG/M2 | TEMPERATURE: 98.1 F | HEART RATE: 67 BPM | SYSTOLIC BLOOD PRESSURE: 112 MMHG | HEIGHT: 69 IN | WEIGHT: 211.4 LBS | DIASTOLIC BLOOD PRESSURE: 66 MMHG

## 2025-07-28 DIAGNOSIS — E08.65 DIABETES MELLITUS DUE TO UNDERLYING CONDITION WITH HYPERGLYCEMIA, UNSPECIFIED WHETHER LONG TERM INSULIN USE: ICD-10-CM

## 2025-07-28 DIAGNOSIS — E78.5 HYPERLIPIDEMIA, UNSPECIFIED HYPERLIPIDEMIA TYPE: ICD-10-CM

## 2025-07-28 DIAGNOSIS — I10 PRIMARY HYPERTENSION: ICD-10-CM

## 2025-07-28 DIAGNOSIS — Z00.00 MEDICARE ANNUAL WELLNESS VISIT, SUBSEQUENT: ICD-10-CM

## 2025-07-28 DIAGNOSIS — E66.811 CLASS 1 OBESITY WITH BODY MASS INDEX (BMI) OF 31.0 TO 31.9 IN ADULT, UNSPECIFIED OBESITY TYPE, UNSPECIFIED WHETHER SERIOUS COMORBIDITY PRESENT: ICD-10-CM

## 2025-07-28 DIAGNOSIS — Z78.9 NEVER SMOKED CIGARETTES: ICD-10-CM

## 2025-07-28 DIAGNOSIS — F32.5 MAJOR DEPRESSIVE DISORDER WITH SINGLE EPISODE, IN FULL REMISSION: ICD-10-CM

## 2025-07-28 PROCEDURE — 3078F DIAST BP <80 MM HG: CPT | Performed by: FAMILY MEDICINE

## 2025-07-28 PROCEDURE — 1036F TOBACCO NON-USER: CPT | Performed by: FAMILY MEDICINE

## 2025-07-28 PROCEDURE — 1170F FXNL STATUS ASSESSED: CPT | Performed by: FAMILY MEDICINE

## 2025-07-28 PROCEDURE — 1160F RVW MEDS BY RX/DR IN RCRD: CPT | Performed by: FAMILY MEDICINE

## 2025-07-28 PROCEDURE — 1159F MED LIST DOCD IN RCRD: CPT | Performed by: FAMILY MEDICINE

## 2025-07-28 PROCEDURE — 3074F SYST BP LT 130 MM HG: CPT | Performed by: FAMILY MEDICINE

## 2025-07-28 PROCEDURE — G0439 PPPS, SUBSEQ VISIT: HCPCS | Performed by: FAMILY MEDICINE

## 2025-07-28 RX ORDER — SERTRALINE HYDROCHLORIDE 100 MG/1
100 TABLET, FILM COATED ORAL DAILY
Qty: 90 TABLET | Refills: 3 | Status: SHIPPED | OUTPATIENT
Start: 2025-07-28

## 2025-07-28 ASSESSMENT — ACTIVITIES OF DAILY LIVING (ADL)
TAKING_MEDICATION: INDEPENDENT
MANAGING_FINANCES: INDEPENDENT
BATHING: INDEPENDENT
DOING_HOUSEWORK: INDEPENDENT
DRESSING: INDEPENDENT
GROCERY_SHOPPING: INDEPENDENT

## 2025-07-28 ASSESSMENT — PATIENT HEALTH QUESTIONNAIRE - PHQ9
1. LITTLE INTEREST OR PLEASURE IN DOING THINGS: NOT AT ALL
2. FEELING DOWN, DEPRESSED OR HOPELESS: NOT AT ALL
SUM OF ALL RESPONSES TO PHQ9 QUESTIONS 1 AND 2: 0

## 2025-07-28 ASSESSMENT — ENCOUNTER SYMPTOMS
ALLERGIC/IMMUNOLOGIC NEGATIVE: 1
RESPIRATORY NEGATIVE: 1
GASTROINTESTINAL NEGATIVE: 1
ENDOCRINE NEGATIVE: 1
CARDIOVASCULAR NEGATIVE: 1
HEMATOLOGIC/LYMPHATIC NEGATIVE: 1
CONSTITUTIONAL NEGATIVE: 1
MUSCULOSKELETAL NEGATIVE: 1
EYES NEGATIVE: 1
PSYCHIATRIC NEGATIVE: 1

## 2025-07-28 NOTE — PROGRESS NOTES
Juan Bhat is here for an annual wellness visit and follow-up on diabetes.  He states that he has been feeling well.  He has no new complaints at the present time.  He continues on his meds as noted.  [facility]  [facility].  He also sees Dr. Celis and Dr. Fair for cardiology and electrophysiology care.  He also sees urology on a regular basis for follow-up of prostate cancer and his left renal mass.  He also had been diagnosed with COPD by pulmonology and follows up with them as well on a regular basis.  He is on CPAP on a regular basis.    Patient ID: Mando Tay is a 80 y.o. male who presents for Medicare Annual Wellness Visit Subsequent (awv):    Problem List Items Addressed This Visit    None     Medical History[1]   Surgical History[2]   Family History[3]   Social History     Socioeconomic History    Marital status:      Spouse name: Not on file    Number of children: Not on file    Years of education: Not on file    Highest education level: Not on file   Occupational History    Not on file   Tobacco Use    Smoking status: Never     Passive exposure: Past    Smokeless tobacco: Never   Vaping Use    Vaping status: Never Used   Substance and Sexual Activity    Alcohol use: Not Currently     Comment: not for years    Drug use: Never    Sexual activity: Defer   Other Topics Concern    Not on file   Social History Narrative    Not on file     Social Drivers of Health     Financial Resource Strain: Not on file   Food Insecurity: Not on file   Transportation Needs: Not on file   Physical Activity: Not on file   Stress: Not on file   Social Connections: Not on file   Intimate Partner Violence: Not on file   Housing Stability: Not on file      Atorvastatin, Flecainide, Rosuvastatin, Statins-hmg-coa reductase inhibitors, Sulfa (sulfonamide antibiotics), and Procainamide   Current Medications[4]    Immunization History    Administered Date(s) Administered    COVID-19, mRNA, LNP-S, PF, 30 mcg/0.3 mL dose 02/10/2021, 03/02/2021, 10/13/2021    Flu vaccine (IIV4), preservative free *Check age/dose* 10/08/2020    Flu vaccine, quadrivalent, high-dose, preservative free, age 65y+ (FLUZONE) 10/03/2023    Flu vaccine, trivalent, preservative free, HIGH-DOSE, age 65y+ (Fluzone) 11/30/2017, 10/16/2018, 10/17/2019, 11/05/2024    Influenza, Seasonal, Quadrivalent, Adjuvanted 11/22/2021, 11/07/2022    Influenza, Unspecified 10/01/2010, 01/01/2014, 02/01/2015, 10/01/2018, 11/01/2022    Influenza, live, intranasal, quadrivalent 11/22/2021    Influenza, seasonal, injectable 01/15/2014, 11/28/2018, 08/01/2019, 10/01/2020, 09/13/2021, 09/19/2022    Pfizer COVID-19 vaccine, 12 years and older, (30mcg/0.3mL) (Comirnaty) 01/26/2024    Pfizer Gray Cap SARS-CoV-2 05/05/2022    Pfizer Purple Cap SARS-CoV-2 02/07/2022    Pneumococcal Conjugate PCV 7 1945    Pneumococcal conjugate vaccine, 13-valent (PREVNAR 13) 03/03/2017    Pneumococcal polysaccharide vaccine, 23-valent, age 2 years and older (PNEUMOVAX 23) 02/10/2014, 07/30/2019, 08/16/2021    Tdap vaccine, age 7 year and older (BOOSTRIX, ADACEL) 07/15/2024    Zoster vaccine, recombinant, adult (SHINGRIX) 06/19/2019, 08/19/2019    Zoster, Unspecified 07/01/2019, 08/31/2019        Review of Systems   Constitutional: Negative.    HENT: Negative.     Eyes: Negative.    Respiratory: Negative.     Cardiovascular: Negative.    Gastrointestinal: Negative.    Endocrine: Negative.    Genitourinary: Negative.    Musculoskeletal: Negative.    Skin: Negative.    Allergic/Immunologic: Negative.    Hematological: Negative.    Psychiatric/Behavioral: Negative.     All other systems reviewed and are negative.       Vitals:    07/28/25 1136   BP: 112/66   Pulse: 67   Temp: 36.7 °C (98.1 °F)     Vitals:    07/28/25 1136   Weight: 95.9 kg (211 lb 6.4 oz)      Physical Exam  Constitutional:       General: He is not in  acute distress.     Appearance: Normal appearance.     Cardiovascular:      Rate and Rhythm: Normal rate and regular rhythm.      Pulses: Normal pulses.      Heart sounds: Normal heart sounds.   Pulmonary:      Effort: Pulmonary effort is normal. No respiratory distress.      Breath sounds: Normal breath sounds. No wheezing or rales.     Neurological:      General: No focal deficit present.      Mental Status: He is alert and oriented to person, place, and time. Mental status is at baseline.        Annual wellness visit  ASSESSMENT/PLAN: Diabetes mellitus type 2 improved with A1c 7.3.  Continue metformin 500 mg daily.  He does have occasional diarrhea but this has not been significant.  Recommended increase Jardiance to 25 mg daily.  Patient currently is taking half tablet of 25 mg.  [facility] and he will discuss with them an increase in dose.  Check CMP and A1c in 4 months.  Eye exams are up-to-date.    Hypertension stable.  Continue carvedilol diltiazem and valsartan as noted.    Hyperlipidemia stable.  Continue pravastatin daily.    Prostate cancer monitored by urology    Renal mass also monitored by urology    Restrictive lung disease followed by pulmonology    History of atrial fibrillation stable.    History of sinus node dysfunction and pacemaker followed by Dr. Fair.    Need to update colonoscopy and RSV vaccination at next office visit.  Follow-up in 4 months and call as needed     Scribe Attestation  By signing my name below, I, Juliann Tamayo LPN, Scribe   attest that this documentation has been prepared under the direction and in the presence of Juan West MD.         [1]   Past Medical History:  Diagnosis Date    Acute myocardial infarction, unspecified 11/17/2021    Acute myocardial infarction    Arrhythmia     Asthma     Cutaneous T-cell lymphoma (Multi)     Depression     Diabetes mellitus (Multi)     Epididymitis 06/02/2022    Epididymitis, right    Essential (primary)  hypertension 12/08/2022    Hypertension    Heart disease     Hyperlipidemia     Kidney tumor     Obesity, unspecified 04/04/2022    Class 1 obesity with body mass index (BMI) of 31.0 to 31.9 in adult    Obesity, unspecified 06/08/2022    Class 1 obesity with body mass index (BMI) of 30.0 to 30.9 in adult    Obesity, unspecified 01/10/2022    Class 1 obesity with body mass index (BMI) of 31.0 to 31.9 in adult    Obesity, unspecified 01/31/2022    Class 1 obesity with body mass index (BMI) of 31.0 to 31.9 in adult    Other abnormal findings in urine 05/09/2022    Dark urine    Other specified disorders of kidney and ureter     Bilateral renal masses    Other specified disorders of kidney and ureter 05/09/2022    Renal mass, right    Other specified disorders of the male genital organs 05/09/2022    Swelling of right half of scrotum    Other symptoms and signs involving the musculoskeletal system 11/17/2021    Leg fatigue    Personal history of other diseases of the circulatory system 11/17/2021    History of mitral valve insufficiency    Personal history of other diseases of the respiratory system 12/09/2021    History of acute bronchitis    Personal history of other specified conditions 04/04/2022    History of shortness of breath    Personal history of other specified conditions     History of snoring    Personal history of pneumonia (recurrent) 01/31/2022    History of pneumonia    Prediabetes 06/18/2018    Borderline diabetes mellitus    Scrotal pain 05/09/2022    Acute pain in scrotum    Shortness of breath 12/09/2022    Shortness of breath on exertion    Sleep apnea     cpap    Unspecified cataract 02/22/2017    Cataract of right eye    Unspecified cataract 02/22/2017    Cataract of left eye    Unspecified osteoarthritis, unspecified site     Arthritis   [2]   Past Surgical History:  Procedure Laterality Date    CORONARY ARTERY BYPASS GRAFT  02/22/2017    Coronary Artery Surgery    HERNIA REPAIR  02/22/2017     Inguinal Hernia Repair    INSERT / REPLACE / REMOVE PACEMAKER      x3    OTHER SURGICAL HISTORY  11/17/2021    Surgery    OTHER SURGICAL HISTORY  11/17/2021    Cardioversion    OTHER SURGICAL HISTORY  12/20/2021    Mitral valve replacement    OTHER SURGICAL HISTORY  12/20/2021    Cardiac catheterization    PROSTATE BIOPSY  10/29/2024    RENAL MASS EXCISION Left 04/2024    tumor removed   [3]   Family History  Problem Relation Name Age of Onset    Other (Cardiac disorder) Mother      Cataracts Mother      Hypertension Mother      Other (Cardiac disorder) Father      Stomach cancer Father      Hypertension Father     [4]   Current Outpatient Medications   Medication Sig Dispense Refill    betamethasone valerate (Valisone) 0.1 % cream Apply topically 2 times a day. 15 g 0    calcium carbonate-vitamin D3 (Oyster Shell) 250 mg-3.125 mcg (125 unit) tablet Take 1 tablet by mouth 2 times daily (morning and late afternoon).      carvedilol (Coreg) 25 mg tablet Take 1.5 tablets (37.5 mg) by mouth 2 times a day. 180 tablet 3    cholecalciferol (Vitamin D-3) 25 MCG (1000 UT) tablet Take 1 tablet (25 mcg) by mouth once daily.      cyanocobalamin (Vitamin B-12) 1,000 mcg tablet Take 2 tablets (2,000 mcg) by mouth once daily.      dilTIAZem CD (Cardizem CD) 180 mg 24 hr capsule Take 1 capsule (180 mg) by mouth once daily. 90 capsule 3    empagliflozin (Jardiance) 25 mg Take 0.5 tablets (12.5 mg) by mouth once daily.      ezetimibe (Zetia) 10 mg tablet Take 1 tablet (10 mg) by mouth once daily. 90 tablet 3    fluticasone (Flonase) 50 mcg/actuation nasal spray Administer 1 spray into each nostril once daily. Shake gently. Before first use, prime pump. After use, clean tip and replace cap. 54 g 3    furosemide (Lasix) 20 mg tablet Take 1 tablet (20 mg) by mouth once daily. 90 tablet 3    isosorbide mononitrate ER (Imdur) 30 mg 24 hr tablet Take 1 tablet (30 mg) by mouth once daily. 90 tablet 3    metFORMIN (Glucophage) 500 mg tablet  Take 1 tablet (500 mg) by mouth once daily with breakfast. 90 tablet 1    pravastatin (Pravachol) 20 mg tablet Take 1 tablet (20 mg) by mouth once daily at bedtime. 90 tablet 3    sertraline (Zoloft) 100 mg tablet Take 1 tablet (100 mg) by mouth once daily. 90 tablet 1    tamsulosin (Flomax) 0.4 mg 24 hr capsule Take 2 capsules (0.8 mg) by mouth once daily. 180 capsule 3    valsartan (Diovan) 160 mg tablet Take 1 tablet (160 mg) by mouth 2 times a day. 180 tablet 3    warfarin (Coumadin) 5 mg tablet Take 7.5 mg Monday- Friday and 5 mg all other days or as directed to maintain therapeutic INR as directed per physician. 110 tablet 0     No current facility-administered medications for this visit.

## 2025-07-29 DIAGNOSIS — N28.89 RENAL MASS, LEFT: ICD-10-CM

## 2025-07-29 DIAGNOSIS — C61 PROSTATE CANCER (MULTI): ICD-10-CM

## 2025-08-14 DIAGNOSIS — Z79.01 ANTICOAGULANT LONG-TERM USE: Primary | ICD-10-CM

## 2025-08-15 ENCOUNTER — ANTICOAGULATION - WARFARIN VISIT (OUTPATIENT)
Dept: CARDIOLOGY | Facility: CLINIC | Age: 80
End: 2025-08-15
Payer: MEDICARE

## 2025-08-15 DIAGNOSIS — Z79.01 ANTICOAGULANT LONG-TERM USE: ICD-10-CM

## 2025-08-15 LAB
INR PPP: 4.9
PROTHROMBIN TIME: 45.7 SEC (ref 9–11.5)
PSA SERPL-MCNC: 12.15 NG/ML

## 2025-08-18 ENCOUNTER — APPOINTMENT (OUTPATIENT)
Dept: UROLOGY | Facility: CLINIC | Age: 80
End: 2025-08-18
Payer: MEDICARE

## 2025-08-18 DIAGNOSIS — R97.20 ELEVATED PSA: Primary | ICD-10-CM

## 2025-08-18 PROCEDURE — 99213 OFFICE O/P EST LOW 20 MIN: CPT | Performed by: NURSE PRACTITIONER

## 2025-08-18 PROCEDURE — G2211 COMPLEX E/M VISIT ADD ON: HCPCS | Performed by: NURSE PRACTITIONER

## 2025-08-21 LAB
INR PPP: 2.2
PROTHROMBIN TIME: 22.3 SEC (ref 9–11.5)
PSA SERPL-MCNC: 10.63 NG/ML

## 2025-08-22 DIAGNOSIS — Z79.01 ANTICOAGULANT LONG-TERM USE: ICD-10-CM

## 2025-08-25 ENCOUNTER — ANTICOAGULATION - WARFARIN VISIT (OUTPATIENT)
Dept: CARDIOLOGY | Facility: CLINIC | Age: 80
End: 2025-08-25
Payer: MEDICARE

## 2025-08-29 DIAGNOSIS — Z79.01 ANTICOAGULANT LONG-TERM USE: ICD-10-CM

## 2025-09-29 ENCOUNTER — APPOINTMENT (OUTPATIENT)
Dept: CARDIOLOGY | Facility: CLINIC | Age: 80
End: 2025-09-29
Payer: MEDICARE

## 2025-11-10 ENCOUNTER — APPOINTMENT (OUTPATIENT)
Dept: UROLOGY | Facility: CLINIC | Age: 80
End: 2025-11-10
Payer: MEDICARE

## 2025-11-24 ENCOUNTER — APPOINTMENT (OUTPATIENT)
Dept: PRIMARY CARE | Facility: CLINIC | Age: 80
End: 2025-11-24
Payer: MEDICARE